# Patient Record
Sex: MALE | Race: WHITE | NOT HISPANIC OR LATINO | Employment: OTHER | URBAN - METROPOLITAN AREA
[De-identification: names, ages, dates, MRNs, and addresses within clinical notes are randomized per-mention and may not be internally consistent; named-entity substitution may affect disease eponyms.]

---

## 2017-11-16 PROBLEM — E11.9 TYPE 2 DIABETES MELLITUS WITHOUT COMPLICATION, WITHOUT LONG-TERM CURRENT USE OF INSULIN: Status: ACTIVE | Noted: 2017-11-16

## 2018-02-07 PROBLEM — M17.12 PRIMARY OSTEOARTHRITIS OF LEFT KNEE: Status: ACTIVE | Noted: 2018-02-07

## 2018-07-16 DIAGNOSIS — S32.009A LUMBAR VERTERBRAL FRACTURE, TRAUMATIC: ICD-10-CM

## 2018-07-24 PROBLEM — R07.9 CHEST PAIN WITH MODERATE RISK OF ACUTE CORONARY SYNDROME: Status: ACTIVE | Noted: 2018-07-24

## 2019-05-23 DIAGNOSIS — E11.9 TYPE 2 DIABETES MELLITUS WITHOUT COMPLICATION, WITHOUT LONG-TERM CURRENT USE OF INSULIN: Primary | ICD-10-CM

## 2019-05-23 RX ORDER — GLIPIZIDE 5 MG/1
5 TABLET ORAL DAILY
Qty: 90 TABLET | Refills: 3 | Status: SHIPPED | OUTPATIENT
Start: 2019-05-23 | End: 2020-02-10

## 2019-05-23 RX ORDER — GLIPIZIDE 5 MG/1
1 TABLET ORAL DAILY
COMMUNITY
Start: 2019-03-16 | End: 2019-05-23 | Stop reason: SDUPTHER

## 2019-05-23 NOTE — TELEPHONE ENCOUNTER
----- Message from Hao Rai sent at 5/23/2019 12:03 PM CDT -----  Contact: Chanda - Spouse  Type: Needs Medical Advice    Who Called:  Chanda  Pharmacy name and phone #:    [facility] - [place], [place] - [address]  [address] [address]  Phone: [phone] Fax: [phone]  Best Call Back Number: [phone]  Additional Information: Caller states that [facility] would like a new prescription of Glipizide (not listed) in order to refill the medication. Caller states that medication was prescribed before Dr. Parekh came to [facility]. Caller would also like to discuss lab orders. Please call to advise. Thanks!

## 2019-06-12 ENCOUNTER — TELEPHONE (OUTPATIENT)
Dept: FAMILY MEDICINE | Facility: CLINIC | Age: 74
End: 2019-06-12

## 2019-06-12 NOTE — TELEPHONE ENCOUNTER
----- Message from Nate Parekh MD sent at 6/12/2019 12:59 PM CDT -----  Very little protein in urine is very good.  Hepatitis-C is negative.  Diabetes is very well controlled.  Liver function and kidney function and electrolytes are all normal.  Bad cholesterol is just a few points to high.  Please continue the same medicines and improved diet.  White blood cell red blood cell platelets are all normal.

## 2019-07-19 ENCOUNTER — OFFICE VISIT (OUTPATIENT)
Dept: FAMILY MEDICINE | Facility: CLINIC | Age: 74
End: 2019-07-19
Payer: MEDICARE

## 2019-07-19 VITALS
HEART RATE: 79 BPM | WEIGHT: 306.44 LBS | HEIGHT: 74 IN | SYSTOLIC BLOOD PRESSURE: 118 MMHG | BODY MASS INDEX: 39.33 KG/M2 | OXYGEN SATURATION: 97 % | DIASTOLIC BLOOD PRESSURE: 70 MMHG

## 2019-07-19 DIAGNOSIS — I48.0 PAROXYSMAL ATRIAL FIBRILLATION: ICD-10-CM

## 2019-07-19 DIAGNOSIS — R60.0 LOCALIZED EDEMA: ICD-10-CM

## 2019-07-19 DIAGNOSIS — I10 ESSENTIAL HYPERTENSION: Primary | ICD-10-CM

## 2019-07-19 DIAGNOSIS — Z12.5 PROSTATE CANCER SCREENING: ICD-10-CM

## 2019-07-19 DIAGNOSIS — N40.0 BENIGN PROSTATIC HYPERPLASIA WITHOUT LOWER URINARY TRACT SYMPTOMS: ICD-10-CM

## 2019-07-19 DIAGNOSIS — K21.9 GERD WITHOUT ESOPHAGITIS: ICD-10-CM

## 2019-07-19 DIAGNOSIS — I25.10 CORONARY ARTERY DISEASE INVOLVING NATIVE CORONARY ARTERY OF NATIVE HEART WITHOUT ANGINA PECTORIS: ICD-10-CM

## 2019-07-19 DIAGNOSIS — E78.2 MIXED HYPERLIPIDEMIA: ICD-10-CM

## 2019-07-19 DIAGNOSIS — E66.01 CLASS 2 SEVERE OBESITY DUE TO EXCESS CALORIES WITH SERIOUS COMORBIDITY AND BODY MASS INDEX (BMI) OF 39.0 TO 39.9 IN ADULT: ICD-10-CM

## 2019-07-19 DIAGNOSIS — E11.9 CONTROLLED TYPE 2 DIABETES MELLITUS WITHOUT COMPLICATION, WITHOUT LONG-TERM CURRENT USE OF INSULIN: ICD-10-CM

## 2019-07-19 PROCEDURE — G0009 ADMIN PNEUMOCOCCAL VACCINE: HCPCS | Mod: HCNC,S$GLB,, | Performed by: INTERNAL MEDICINE

## 2019-07-19 PROCEDURE — 3078F PR MOST RECENT DIASTOLIC BLOOD PRESSURE < 80 MM HG: ICD-10-PCS | Mod: HCNC,CPTII,S$GLB, | Performed by: INTERNAL MEDICINE

## 2019-07-19 PROCEDURE — 99999 PR PBB SHADOW E&M-EST. PATIENT-LVL IV: ICD-10-PCS | Mod: PBBFAC,HCNC,, | Performed by: INTERNAL MEDICINE

## 2019-07-19 PROCEDURE — 99999 PR PBB SHADOW E&M-EST. PATIENT-LVL IV: CPT | Mod: PBBFAC,HCNC,, | Performed by: INTERNAL MEDICINE

## 2019-07-19 PROCEDURE — 1101F PR PT FALLS ASSESS DOC 0-1 FALLS W/OUT INJ PAST YR: ICD-10-PCS | Mod: HCNC,CPTII,S$GLB, | Performed by: INTERNAL MEDICINE

## 2019-07-19 PROCEDURE — 99214 PR OFFICE/OUTPT VISIT, EST, LEVL IV, 30-39 MIN: ICD-10-PCS | Mod: 25,HCNC,S$GLB, | Performed by: INTERNAL MEDICINE

## 2019-07-19 PROCEDURE — 1101F PT FALLS ASSESS-DOCD LE1/YR: CPT | Mod: HCNC,CPTII,S$GLB, | Performed by: INTERNAL MEDICINE

## 2019-07-19 PROCEDURE — 3078F DIAST BP <80 MM HG: CPT | Mod: HCNC,CPTII,S$GLB, | Performed by: INTERNAL MEDICINE

## 2019-07-19 PROCEDURE — 90670 PCV13 VACCINE IM: CPT | Mod: HCNC,S$GLB,, | Performed by: INTERNAL MEDICINE

## 2019-07-19 PROCEDURE — 99214 OFFICE O/P EST MOD 30 MIN: CPT | Mod: 25,HCNC,S$GLB, | Performed by: INTERNAL MEDICINE

## 2019-07-19 PROCEDURE — 3074F SYST BP LT 130 MM HG: CPT | Mod: HCNC,CPTII,S$GLB, | Performed by: INTERNAL MEDICINE

## 2019-07-19 PROCEDURE — 90670 PNEUMOCOCCAL CONJUGATE VACCINE 13-VALENT LESS THAN 5YO & GREATER THAN: ICD-10-PCS | Mod: HCNC,S$GLB,, | Performed by: INTERNAL MEDICINE

## 2019-07-19 PROCEDURE — 3074F PR MOST RECENT SYSTOLIC BLOOD PRESSURE < 130 MM HG: ICD-10-PCS | Mod: HCNC,CPTII,S$GLB, | Performed by: INTERNAL MEDICINE

## 2019-07-19 PROCEDURE — 3044F PR MOST RECENT HEMOGLOBIN A1C LEVEL <7.0%: ICD-10-PCS | Mod: HCNC,CPTII,S$GLB, | Performed by: INTERNAL MEDICINE

## 2019-07-19 PROCEDURE — 3044F HG A1C LEVEL LT 7.0%: CPT | Mod: HCNC,CPTII,S$GLB, | Performed by: INTERNAL MEDICINE

## 2019-07-19 PROCEDURE — G0009 PNEUMOCOCCAL CONJUGATE VACCINE 13-VALENT LESS THAN 5YO & GREATER THAN: ICD-10-PCS | Mod: HCNC,S$GLB,, | Performed by: INTERNAL MEDICINE

## 2019-07-19 NOTE — PROGRESS NOTES
Patient ID: Nate Bah     Chief Complaint:   Chief Complaint   Patient presents with    Establish Care/Previous Pt        HPI: New Patient to [facility] but known to me and Dr. Mckeon.   Doing well overall, but does have 2+ bilateral lower extremity edema and bilateral foot fungus- can't take Diflucan due to Tikosyn.  Labs reviewed: diabetes mellitus controlled, Urine protein low, LDL slightly high, other labs great.   Needs some health maintenance exams.     Review of Systems   Constitutional: Negative.  Negative for activity change and unexpected weight change.   HENT: Negative.  Negative for hearing loss, rhinorrhea and trouble swallowing.    Eyes: Negative.  Negative for discharge and visual disturbance.   Respiratory: Negative.  Negative for chest tightness and wheezing.    Cardiovascular: Negative.  Negative for chest pain and palpitations.   Gastrointestinal: Negative.  Negative for blood in stool, constipation, diarrhea and vomiting.   Endocrine: Negative.  Negative for polydipsia and polyuria.   Genitourinary: Negative.  Negative for difficulty urinating, hematuria and urgency.   Musculoskeletal: Negative.  Negative for arthralgias, joint swelling and neck pain.   Skin: Negative.    Allergic/Immunologic: Negative.    Neurological: Negative.  Negative for weakness and headaches.   Hematological: Negative.    Psychiatric/Behavioral: Negative.  Negative for confusion and dysphoric mood.   All other systems reviewed and are negative.         Objective:      Physical Exam   Physical Exam   Constitutional: He is oriented to person, place, and time. He appears well-developed and well-nourished.   HENT:   Head: Normocephalic and atraumatic.   Nose: Nose normal.   Mouth/Throat: Oropharynx is clear and moist.   Eyes: Pupils are equal, round, and reactive to light. Conjunctivae and EOM are normal.   Neck: Normal range of motion. Neck supple.   Cardiovascular: Normal rate, regular rhythm, normal heart sounds and  "intact distal pulses.   Pulmonary/Chest: Effort normal.   Decreased BS bilaterally    Abdominal: Soft. Bowel sounds are normal.   Musculoskeletal: Normal range of motion.   Neurological: He is alert and oriented to person, place, and time.   Skin: Skin is warm and dry. Capillary refill takes less than 2 seconds.   Psychiatric: He has a normal mood and affect. His behavior is normal. Judgment and thought content normal.   Nursing note and vitals reviewed.      Protective Sensation (w/ 10 gram monofilament):  Right: Intact  Left: Intact    Visual Inspection:  Callus -  Bilateral, Dry Skin -  Bilateral and Onychomycosis -  Bilateral    Pedal Pulses:   Right: Diminshed  Left: Diminshed    Posterior tibialis:   Right:Diminshed  Left: Diminshed      Vitals:   Vitals:    07/19/19 1353   BP: 118/70   BP Location: Right arm   Patient Position: Sitting   BP Method: Large (Manual)   Pulse: 79   SpO2: 97%   Weight: (!) 139 kg (306 lb 7 oz)   Height: 6' 2" (1.88 m)      Assessment:       Patient Active Problem List    Diagnosis Date Noted    Prostate cancer screening 07/19/2019    Obesity (BMI 30-39.9)     Mixed hyperlipidemia     GERD without esophagitis     Edema     Diabetes mellitus type 2, controlled, without complications     CAD (coronary artery disease)     BPH (benign prostatic hyperplasia)     A-fib     Primary osteoarthritis of left knee 02/07/2018    Type 2 diabetes mellitus without complication, without long-term current use of insulin 11/16/2017    Diffuse esophageal spasm 12/04/2016    Benign prostate hyperplasia 05/11/2016    Gout 09/29/2014    Paroxysmal atrial fibrillation 05/29/2014    Coronary artery disease + Ectasia, h/o CABG & stentsCABG x 1, LIMA to LAD 8/2003 D[place]orte, Stent RCA 2002, stent LCx 7/09, stent ostial LAD, LCx 11/11, 7/18 stent prox LAD 4.0 x 28 Synergy 12/20/2010    Essential hypertension 12/20/2010    Class 2 obesity with body mass index (BMI) of 39.0 to 39.9 in adult " 12/20/2010    Gastroesophageal reflux disease without esophagitis 08/20/2010          Plan:       Nate was seen today for establish care/previous pt.    Diagnoses and all orders for this visit:    Essential hypertension  -     Varicella zoster antibody, IgG; Future  -     US Abdominal Aorta; Future  Controlled     Prostate cancer screening  -     PSA, Screening; Future    Mixed hyperlipidemia  Controlled     GERD without esophagitis  Controlled     Localized edema  Uncontrolled  - cont Lasix and wear compression socks   Get Echo     Controlled type 2 diabetes mellitus without complication, without long-term current use of insulin  -     Diabetic Eye Screening Photo; Future    Coronary artery disease involving native coronary artery of native heart without angina pectoris  Stable     Benign prostatic hyperplasia without lower urinary tract symptoms  Monitor PSA     Paroxysmal atrial fibrillation  Controlled     Class 2 severe obesity due to excess calories with serious comorbidity and body mass index (BMI) of 39.0 to 39.9 in adult  Dieting and exercising to lose weight - has lost 18 lbs    Other orders  -     (In Office Administered) Pneumococcal Conjugate Vaccine (13 Valent) (IM)

## 2019-07-21 ENCOUNTER — PATIENT MESSAGE (OUTPATIENT)
Dept: FAMILY MEDICINE | Facility: CLINIC | Age: 74
End: 2019-07-21

## 2019-07-25 ENCOUNTER — PATIENT MESSAGE (OUTPATIENT)
Dept: FAMILY MEDICINE | Facility: CLINIC | Age: 74
End: 2019-07-25

## 2019-07-26 ENCOUNTER — HOSPITAL ENCOUNTER (OUTPATIENT)
Dept: RADIOLOGY | Facility: HOSPITAL | Age: 74
Discharge: HOME | End: 2019-07-26
Attending: INTERNAL MEDICINE
Payer: MEDICARE

## 2019-07-26 DIAGNOSIS — I10 ESSENTIAL HYPERTENSION: ICD-10-CM

## 2019-07-26 PROCEDURE — 76775 US ABDOMINAL AORTA: ICD-10-PCS | Mod: 26,HCNC,, | Performed by: RADIOLOGY

## 2019-07-26 PROCEDURE — 76775 US EXAM ABDO BACK WALL LIM: CPT | Mod: TC,HCNC,PO

## 2019-07-26 PROCEDURE — 76775 US EXAM ABDO BACK WALL LIM: CPT | Mod: 26,HCNC,, | Performed by: RADIOLOGY

## 2019-07-26 NOTE — TELEPHONE ENCOUNTER
My mistake that I didn't write it down. I think you're referring to an OTC cream for the fungus on his feet. He's can't take Diflucan due to his Tikosyn, so try OTC Terbinafine cream to bilateral feet twice daily x few weeks until it resolves.

## 2019-08-12 ENCOUNTER — OFFICE VISIT (OUTPATIENT)
Dept: PODIATRY | Facility: CLINIC | Age: 74
End: 2019-08-12
Payer: MEDICARE

## 2019-08-12 VITALS
HEART RATE: 86 BPM | HEIGHT: 74 IN | DIASTOLIC BLOOD PRESSURE: 80 MMHG | BODY MASS INDEX: 39.33 KG/M2 | WEIGHT: 306.44 LBS | SYSTOLIC BLOOD PRESSURE: 115 MMHG

## 2019-08-12 DIAGNOSIS — M20.42 HAMMER TOES OF BOTH FEET: ICD-10-CM

## 2019-08-12 DIAGNOSIS — B35.3 TINEA PEDIS OF RIGHT FOOT: ICD-10-CM

## 2019-08-12 DIAGNOSIS — B35.1 ONYCHOMYCOSIS DUE TO DERMATOPHYTE: ICD-10-CM

## 2019-08-12 DIAGNOSIS — L84 CORN OR CALLUS: ICD-10-CM

## 2019-08-12 DIAGNOSIS — M21.41 PES PLANUS OF BOTH FEET: ICD-10-CM

## 2019-08-12 DIAGNOSIS — M21.42 PES PLANUS OF BOTH FEET: ICD-10-CM

## 2019-08-12 DIAGNOSIS — M20.41 HAMMER TOES OF BOTH FEET: ICD-10-CM

## 2019-08-12 DIAGNOSIS — E66.01 SEVERE OBESITY (BMI 35.0-39.9) WITH COMORBIDITY: ICD-10-CM

## 2019-08-12 DIAGNOSIS — E11.42 DIABETIC POLYNEUROPATHY ASSOCIATED WITH TYPE 2 DIABETES MELLITUS: Primary | ICD-10-CM

## 2019-08-12 PROCEDURE — 3079F DIAST BP 80-89 MM HG: CPT | Mod: HCNC,CPTII,S$GLB, | Performed by: PODIATRIST

## 2019-08-12 PROCEDURE — 11056 PARNG/CUTG B9 HYPRKR LES 2-4: CPT | Mod: Q9,HCNC,S$GLB, | Performed by: PODIATRIST

## 2019-08-12 PROCEDURE — 99203 PR OFFICE/OUTPT VISIT, NEW, LEVL III, 30-44 MIN: ICD-10-PCS | Mod: 25,HCNC,S$GLB, | Performed by: PODIATRIST

## 2019-08-12 PROCEDURE — 3074F PR MOST RECENT SYSTOLIC BLOOD PRESSURE < 130 MM HG: ICD-10-PCS | Mod: HCNC,CPTII,S$GLB, | Performed by: PODIATRIST

## 2019-08-12 PROCEDURE — 3044F PR MOST RECENT HEMOGLOBIN A1C LEVEL <7.0%: ICD-10-PCS | Mod: HCNC,CPTII,S$GLB, | Performed by: PODIATRIST

## 2019-08-12 PROCEDURE — 3079F PR MOST RECENT DIASTOLIC BLOOD PRESSURE 80-89 MM HG: ICD-10-PCS | Mod: HCNC,CPTII,S$GLB, | Performed by: PODIATRIST

## 2019-08-12 PROCEDURE — 99999 PR PBB SHADOW E&M-EST. PATIENT-LVL III: ICD-10-PCS | Mod: PBBFAC,HCNC,, | Performed by: PODIATRIST

## 2019-08-12 PROCEDURE — 11721 PR DEBRIDEMENT OF NAILS, 6 OR MORE: ICD-10-PCS | Mod: 59,Q9,HCNC,S$GLB | Performed by: PODIATRIST

## 2019-08-12 PROCEDURE — 1101F PT FALLS ASSESS-DOCD LE1/YR: CPT | Mod: HCNC,CPTII,S$GLB, | Performed by: PODIATRIST

## 2019-08-12 PROCEDURE — 11056 PR TRIM BENIGN HYPERKERATOTIC SKIN LESION,2-4: ICD-10-PCS | Mod: Q9,HCNC,S$GLB, | Performed by: PODIATRIST

## 2019-08-12 PROCEDURE — 11721 DEBRIDE NAIL 6 OR MORE: CPT | Mod: 59,Q9,HCNC,S$GLB | Performed by: PODIATRIST

## 2019-08-12 PROCEDURE — 99203 OFFICE O/P NEW LOW 30 MIN: CPT | Mod: 25,HCNC,S$GLB, | Performed by: PODIATRIST

## 2019-08-12 PROCEDURE — 99999 PR PBB SHADOW E&M-EST. PATIENT-LVL III: CPT | Mod: PBBFAC,HCNC,, | Performed by: PODIATRIST

## 2019-08-12 PROCEDURE — 3044F HG A1C LEVEL LT 7.0%: CPT | Mod: HCNC,CPTII,S$GLB, | Performed by: PODIATRIST

## 2019-08-12 PROCEDURE — 3074F SYST BP LT 130 MM HG: CPT | Mod: HCNC,CPTII,S$GLB, | Performed by: PODIATRIST

## 2019-08-12 PROCEDURE — 1101F PR PT FALLS ASSESS DOC 0-1 FALLS W/OUT INJ PAST YR: ICD-10-PCS | Mod: HCNC,CPTII,S$GLB, | Performed by: PODIATRIST

## 2019-08-12 RX ORDER — ECONAZOLE NITRATE 10 MG/G
CREAM TOPICAL 2 TIMES DAILY
Qty: 30 G | Refills: 2 | Status: SHIPPED | OUTPATIENT
Start: 2019-08-12 | End: 2022-07-19

## 2019-08-12 NOTE — LETTER
August 12, 2019      Nate Parekh MD  [address] [facility] [address] [place] [address]           [facility]  [address] [facility] [address] [place] [address]  Phone: [phone]          Patient: Nate Bah   MR Number: [id number]   YOB: 1945   Date of Visit: 8/12/2019       Dear Dr. Nate Parekh:    Thank you for referring Nate Bah to me for evaluation. Attached you will find relevant portions of my assessment and plan of care.    If you have questions, please do not hesitate to call me. I look forward to following Nate Bah along with you.    Sincerely,    Nicholas Avila, RAMONE    Enclosure  CC:  No Recipients    If you would like to receive this communication electronically, please contact [email or web address] or [phone] to request more information on [employer] [facility] access.    For providers and/or their staff who would like to refer a patient to [facility], please contact us through our one-stop-shop provider referral line, [facility], at [phone].    If you feel you have received this communication in error or would no longer like to receive these types of communications, please e-mail [email or web address]

## 2019-08-13 NOTE — PROGRESS NOTES
Subjective:      Patient ID: Nate Bah is a 73 y.o. male.    Chief Complaint: Diabetes Mellitus ( 6.8 6/4/19 [place] 7/19/19) and Diabetic Foot Exam    Nate is a 73 y.o. male who presents to the clinic upon referral from Dr. Parekh  for evaluation and treatment of diabetic feet. Nate has a past medical history of A-fib, JACKSON (acute kidney injury) (12/4/2016), BPH (benign prostatic hyperplasia), CAD (coronary artery disease), Diabetes mellitus type 2, controlled, without complications, Edema, GERD without esophagitis, Gout, Mixed hyperlipidemia, and Obesity (BMI 30-39.9). Patient's chief complaint consists of thick toenails and calluses that are in need of trimming  Denies experiencing pain from these sites on exam.  Has not attempted to self treat.  Also, relates having a rash along the Rt. Medial arch with extension to the medial lower leg.  Relates itching from the site.  Has been previously prescribed triamcinolone cream, however, this has yet to improve his symptoms.  Inquires as to additional treatment options.   Notes better control over his blood glucose.  Denies any additional pedal complaints.    PCP: Nate Parekh MD    Date Last Seen by PCP: 7/19/19    Hemoglobin A1C   Date Value Ref Range Status   06/04/2019 6.8 (H) 0.0 - 5.6 % Final     Comment:     Reference Interval:  5.0 - 5.6 Normal   5.7 - 6.4 High Risk   > 6.5 Diabetic    Hgb A1c results are standardized based on the (NGSP) National   Glycohemoglobin Standardization Program.    Hemoglobin A1C levels are related to mean serum/plasma glucose   during the preceding 2-3 months.        03/07/2019 8.3 (H) 0.0 - 5.6 % Final     Comment:     Reference Interval:  5.0 - 5.6 Normal   5.7 - 6.4 High Risk   > 6.5 Diabetic    Hgb A1c results are standardized based on the (NGSP) National   Glycohemoglobin Standardization Program.    Hemoglobin A1C levels are related to mean serum/plasma glucose   during the preceding 2-3 months.        11/12/2018 6.6 (H) 0.0 -  5.6 % Final     Comment:     Reference Interval:  5.0 - 5.6 Normal   5.7 - 6.4 High Risk   > 6.5 Diabetic    Hgb A1c results are standardized based on the (NGSP) National   Glycohemoglobin Standardization Program.    Hemoglobin A1C levels are related to mean serum/plasma glucose   during the preceding 2-3 months.                Past Medical History:   Diagnosis Date    A-fib     JACKSON (acute kidney injury) 12/4/2016    BPH (benign prostatic hyperplasia)     CAD (coronary artery disease)     Diabetes mellitus type 2, controlled, without complications     Edema     GERD without esophagitis     Gout     Mixed hyperlipidemia     Obesity (BMI 30-39.9)        Past Surgical History:   Procedure Laterality Date    ARTHROPLASTY-KNEE Left 2/7/2018    Per[facility]et [facility] at [facility] OR    CARDIAC SURGERY  2003    CABG 1 vessel    CHOLECYSTECTOMY      COLON SURGERY  2007?    resection /perforated colon    Coronary angiography N/A 7/24/2018    [facility]    CORONARY ANGIOPLASTY WITH STENT PLACEMENT      5 stents, last one 2015    HEMORRHOID SURGERY      INSERTION, STENT, CORONARY ARTERY N/A 7/24/2018    Performed by [facility] Mckeon MD at [facility] CATH    KNEE ARTHROSCOPY W/ MENISCAL REPAIR Right     KYPHOSIS SURGERY      Left heart cath N/A 7/24/2018    Performed by [facility]keon MD at [facility] CATH       Family History   Problem Relation Age of Onset    Pancreatic cancer Mother     Heart disease Father     No Known Problems Brother        Social History     Socioeconomic History    Marital status:      Spouse name: Not on file    Number of children: Not on file    Years of education: Not on file    Highest education level: Not on file   Occupational History    Not on file   Social Needs    Financial resource strain: Not hard at all    Food insecurity:     Worry: Never true     Inability: Never true    Transportation needs:     Medical: No     Non-medical: No    Tobacco Use    Smoking status: Former Smoker     Packs/day: 1.00     Years: 25.00     Pack years: 25.00     Types: Cigarettes     Last attempt to quit: 2010     Years since quittin.6    Smokeless tobacco: Never Used   Substance and Sexual Activity    Alcohol use: No     Frequency: Never     Binge frequency: Never    Drug use: No    Sexual activity: Yes     Partners: Female   Lifestyle    Physical activity:     Days per week: 0 days     Minutes per session: Not on file    Stress: Not at all   Relationships    Social connections:     Talks on phone: Three times a week     Gets together: Once a week     Attends Jainism service: Not on file     Active member of club or organization: No     Attends meetings of clubs or organizations: Not on file     Relationship status:    Other Topics Concern    Not on file   Social History Narrative    Not on file       Current Outpatient Medications   Medication Sig Dispense Refill    acetaminophen (TYLENOL) 500 mg Cap Take 1,000 mg by mouth daily as needed.       allopurinol (ZYLOPRIM) 300 MG tablet TAKE 1 TABLET  BEFORE  BREAKFAST 90 tablet 3    atorvastatin (LIPITOR) 80 MG tablet TAKE 1 TABLET EVERY DAY 90 tablet 3    clopidogrel (PLAVIX) 75 mg tablet TAKE 1 TABLET EVERY DAY 90 tablet 3    coenzyme Q10 (CO Q-10) 10 mg capsule Take 10 mg by mouth once daily.      dicyclomine (BENTYL) 10 MG capsule Take 10 mg by mouth once daily.       docusate sodium (COLACE) 100 MG capsule Take 100 mg by mouth 2 (two) times daily.      dofetilide (TIKOSYN) 500 MCG Cap Take 1 capsule (500 mcg total) by mouth every 12 (twelve) hours. 60 capsule 6    econazole nitrate 1 % cream Apply topically 2 (two) times daily. 30 g 2    furosemide (LASIX) 40 MG tablet TAKE 1 TABLET EVERY DAY 90 tablet 3    glipiZIDE (GLUCOTROL) 5 MG tablet Take 1 tablet (5 mg total) by mouth once daily. 90 tablet 3    losartan (COZAAR) 100 MG tablet TAKE 1 TABLET EVERY DAY 90 tablet 3     magnesium 200 mg Tab Take 400 mg by mouth once daily.      pantoprazole (PROTONIX) 40 MG tablet TAKE 1 TABLET EVERY DAY 90 tablet 3     No current facility-administered medications for this visit.        Review of patient's allergies indicates:   Allergen Reactions    Ace inhibitors      cough         Review of Systems   Constitution: Negative for chills and fever.   Cardiovascular: Positive for leg swelling. Negative for claudication.   Skin: Positive for color change, dry skin and nail changes.   Musculoskeletal: Negative for muscle cramps, muscle weakness and myalgias.   Neurological: Positive for numbness. Negative for paresthesias.   Psychiatric/Behavioral: Negative for altered mental status.           Objective:      Physical Exam   Constitutional: He is oriented to person, place, and time. He appears well-developed and well-nourished. No distress.   Cardiovascular:   Pulses:       Dorsalis pedis pulses are 2+ on the right side, and 2+ on the left side.        Posterior tibial pulses are 1+ on the right side, and 1+ on the left side.   CFT is < 3 seconds bilateral.  Pedal hair growth is decreased bilateral.  Moderate nonpitting lower extremity edema noted bilateral.  Toes are cool to touch bilateral.     Musculoskeletal: He exhibits edema. He exhibits no tenderness.   Muscle strength 5/5 in all muscle groups bilateral.  No tenderness nor crepitation with ROM of foot/ankle joints bilateral.  No tenderness with palpation of bilateral foot and ankle.  Bilateral pes planus foot type.  Bilateral hallux abducto valgus.  Bilateral semi-reducible contracture of toes 2-5.     Neurological: He is alert and oriented to person, place, and time. He has normal strength. A sensory deficit is present.   Protective sensation per Nedrow-Everardo monofilament is decreased bilateral.  Vibratory sensation is absent on the Lt.  Light touch is intact bilateral.   Skin: Skin is warm, dry and intact. Capillary refill takes 2 to 3  seconds. Lesion and rash noted. No abrasion, no bruising, no burn, no ecchymosis, no laceration and no petechiae noted. Rash is papular. He is not diaphoretic. No erythema. No pallor.   Pedal skin appears edematous bilateral.  Toenails x 10 appear thickened by 2 mm's, elongated by 5 mm's, and discolored with subungual debris.  Focal hyperkeratoses noted to bilateral medial 1st ray.   Papular rash noted to the Rt. Lower medial leg that appears inflamed and dry, scaly.  No break noted in adjacent skin integrity.               Assessment:       Encounter Diagnoses   Name Primary?    Diabetic polyneuropathy associated with type 2 diabetes mellitus Yes    Corn or callus     Onychomycosis due to dermatophyte     Tinea pedis of right foot     Pes planus of both feet     Hammer toes of both feet     Severe obesity (BMI 35.0-39.9) with comorbidity          Plan:       Nate was seen today for diabetes mellitus and diabetic foot exam.    Diagnoses and all orders for this visit:    Diabetic polyneuropathy associated with type 2 diabetes mellitus  -     DIABETIC SHOES FOR HOME USE    Corn or callus  -     DIABETIC SHOES FOR HOME USE    Onychomycosis due to dermatophyte    Tinea pedis of right foot  -     econazole nitrate 1 % cream; Apply topically 2 (two) times daily.    Pes planus of both feet  -     DIABETIC SHOES FOR HOME USE    Hammer toes of both feet  -     DIABETIC SHOES FOR HOME USE    Severe obesity (BMI 35.0-39.9) with comorbidity      I counseled the patient on his conditions, their implications and medical management.    Discussed with patient a variety of treatment options to address onychomycosis including Venkat Vaporub, topical/oral antifungals, and laser therapy.    Advised to regularly clean shoe gear and shower surfaces to limit exposure.    Advised to be wary of walking barefoot on carpeted surfaces at gyms and hotel rooms.  Also, avoid barefoot walking at public showers and pool areas.    Rx written  for econazole nitrate to be applied BID x 2 weeks to the site of tinea pedis.    Discussed the importance of diet and exercise as they pertain to diabetes management and maintaining a healthy BMI.    Rx written for diabetic shoe gear and custom molded orthotics to offload digital deformities and to better support his pes planus.    Discussed the importance of diet and exercise as they pertain to diabetes management and maintaining a healthy BMI.  Shoe inspection. Diabetic Foot Education. Patient reminded of the importance of good nutrition and blood sugar control to help prevent podiatric complications of diabetes. Patient instructed on proper foot hygeine. We discussed wearing proper shoe gear, daily foot inspections, never walking without protective shoe gear, never putting sharp instruments to feet    With patient's permission, nails were aggressively reduced and debrided x 10 to their soft tissue attachment mechanically and with electric , removing all offending nail and debris.  Also, a sterile #15 scalpel was used to trim lesions x 2 down to smooth appearing skin without incident. Patient relates relief following the procedure. He will continue to monitor the areas daily, inspect his feet, wear protective shoe gear when ambulatory, moisturizer to maintain skin integrity and follow in this office in approximately 4 months, sooner p.r.n.    Follow up in about 4 months (around 12/12/2019).    Nicholas Avila DPM

## 2019-11-26 ENCOUNTER — PATIENT MESSAGE (OUTPATIENT)
Dept: ADMINISTRATIVE | Facility: HOSPITAL | Age: 74
End: 2019-11-26

## 2019-11-26 NOTE — LETTER
AUTHORIZATION FOR RELEASE OF   CONFIDENTIAL INFORMATION    Alexx Bynum MD    We are seeing Nate Bah, date of birth 1945, in the clinic at [facility]. Nate Parekh MD is the patient's PCP. Nate Bah has an outstanding lab/procedure at the time we reviewed his chart. In order to help keep his health information updated, he has authorized us to request the following medical record(s):        EYE EXAM                Please fax records to [facility], John C Oubre, MD,  [phone]     If you have any questions, please contact Arianne Kim, Care Coordinator   at [phone].            Patient Name: Nate Bah  : 1945  Patient Phone #: [phone]

## 2020-01-17 ENCOUNTER — LAB VISIT (OUTPATIENT)
Dept: LAB | Facility: HOSPITAL | Age: 75
End: 2020-01-17
Attending: INTERNAL MEDICINE
Payer: MEDICARE

## 2020-01-17 DIAGNOSIS — E78.2 MIXED HYPERLIPIDEMIA: ICD-10-CM

## 2020-01-17 DIAGNOSIS — E11.9 CONTROLLED TYPE 2 DIABETES MELLITUS WITHOUT COMPLICATION, WITHOUT LONG-TERM CURRENT USE OF INSULIN: ICD-10-CM

## 2020-01-17 DIAGNOSIS — R60.0 LOCALIZED EDEMA: ICD-10-CM

## 2020-01-17 LAB
ANION GAP SERPL CALC-SCNC: 9 MMOL/L (ref 8–16)
BUN SERPL-MCNC: 24 MG/DL (ref 8–23)
CALCIUM SERPL-MCNC: 10 MG/DL (ref 8.7–10.5)
CHLORIDE SERPL-SCNC: 99 MMOL/L (ref 95–110)
CHOLEST SERPL-MCNC: 145 MG/DL (ref 120–199)
CHOLEST/HDLC SERPL: 4.7 {RATIO} (ref 2–5)
CO2 SERPL-SCNC: 31 MMOL/L (ref 23–29)
CREAT SERPL-MCNC: 1.6 MG/DL (ref 0.5–1.4)
EST. GFR  (AFRICAN AMERICAN): 48.3 ML/MIN/1.73 M^2
EST. GFR  (NON AFRICAN AMERICAN): 41.8 ML/MIN/1.73 M^2
GLUCOSE SERPL-MCNC: 204 MG/DL (ref 70–110)
HDLC SERPL-MCNC: 31 MG/DL (ref 40–75)
HDLC SERPL: 21.4 % (ref 20–50)
LDLC SERPL CALC-MCNC: 70.4 MG/DL (ref 63–159)
NONHDLC SERPL-MCNC: 114 MG/DL
POTASSIUM SERPL-SCNC: 4.6 MMOL/L (ref 3.5–5.1)
SODIUM SERPL-SCNC: 139 MMOL/L (ref 136–145)
TRIGL SERPL-MCNC: 218 MG/DL (ref 30–150)

## 2020-01-17 PROCEDURE — 83036 HEMOGLOBIN GLYCOSYLATED A1C: CPT | Mod: HCNC

## 2020-01-17 PROCEDURE — 80061 LIPID PANEL: CPT | Mod: HCNC

## 2020-01-17 PROCEDURE — 80048 BASIC METABOLIC PNL TOTAL CA: CPT | Mod: HCNC

## 2020-01-17 PROCEDURE — 36415 COLL VENOUS BLD VENIPUNCTURE: CPT | Mod: HCNC,PO

## 2020-01-18 LAB
ESTIMATED AVG GLUCOSE: 200 MG/DL (ref 68–131)
HBA1C MFR BLD HPLC: 8.6 % (ref 4–5.6)

## 2020-01-22 ENCOUNTER — OFFICE VISIT (OUTPATIENT)
Dept: FAMILY MEDICINE | Facility: CLINIC | Age: 75
End: 2020-01-22
Payer: MEDICARE

## 2020-01-22 VITALS
HEIGHT: 74 IN | BODY MASS INDEX: 39.87 KG/M2 | WEIGHT: 310.63 LBS | SYSTOLIC BLOOD PRESSURE: 112 MMHG | HEART RATE: 95 BPM | DIASTOLIC BLOOD PRESSURE: 68 MMHG | OXYGEN SATURATION: 97 % | TEMPERATURE: 98 F

## 2020-01-22 DIAGNOSIS — I10 ESSENTIAL HYPERTENSION: ICD-10-CM

## 2020-01-22 DIAGNOSIS — Z12.5 PROSTATE CANCER SCREENING: ICD-10-CM

## 2020-01-22 DIAGNOSIS — E78.2 MIXED HYPERLIPIDEMIA: ICD-10-CM

## 2020-01-22 DIAGNOSIS — I48.0 PAROXYSMAL ATRIAL FIBRILLATION: ICD-10-CM

## 2020-01-22 DIAGNOSIS — R06.2 WHEEZING: ICD-10-CM

## 2020-01-22 DIAGNOSIS — R60.0 LOCALIZED EDEMA: ICD-10-CM

## 2020-01-22 DIAGNOSIS — E66.01 CLASS 2 SEVERE OBESITY DUE TO EXCESS CALORIES WITH SERIOUS COMORBIDITY AND BODY MASS INDEX (BMI) OF 39.0 TO 39.9 IN ADULT: ICD-10-CM

## 2020-01-22 PROBLEM — E66.812 CLASS 2 SEVERE OBESITY DUE TO EXCESS CALORIES WITH SERIOUS COMORBIDITY AND BODY MASS INDEX (BMI) OF 39.0 TO 39.9 IN ADULT: Status: ACTIVE | Noted: 2020-01-22

## 2020-01-22 PROCEDURE — 1159F MED LIST DOCD IN RCRD: CPT | Mod: HCNC,S$GLB,, | Performed by: INTERNAL MEDICINE

## 2020-01-22 PROCEDURE — 1159F PR MEDICATION LIST DOCUMENTED IN MEDICAL RECORD: ICD-10-PCS | Mod: HCNC,S$GLB,, | Performed by: INTERNAL MEDICINE

## 2020-01-22 PROCEDURE — 99214 PR OFFICE/OUTPT VISIT, EST, LEVL IV, 30-39 MIN: ICD-10-PCS | Mod: HCNC,S$GLB,, | Performed by: INTERNAL MEDICINE

## 2020-01-22 PROCEDURE — 1101F PT FALLS ASSESS-DOCD LE1/YR: CPT | Mod: HCNC,CPTII,S$GLB, | Performed by: INTERNAL MEDICINE

## 2020-01-22 PROCEDURE — 99499 RISK ADDL DX/OHS AUDIT: ICD-10-PCS | Mod: HCNC,S$GLB,, | Performed by: INTERNAL MEDICINE

## 2020-01-22 PROCEDURE — 3078F PR MOST RECENT DIASTOLIC BLOOD PRESSURE < 80 MM HG: ICD-10-PCS | Mod: HCNC,CPTII,S$GLB, | Performed by: INTERNAL MEDICINE

## 2020-01-22 PROCEDURE — 3078F DIAST BP <80 MM HG: CPT | Mod: HCNC,CPTII,S$GLB, | Performed by: INTERNAL MEDICINE

## 2020-01-22 PROCEDURE — 99214 OFFICE O/P EST MOD 30 MIN: CPT | Mod: HCNC,S$GLB,, | Performed by: INTERNAL MEDICINE

## 2020-01-22 PROCEDURE — 99999 PR PBB SHADOW E&M-EST. PATIENT-LVL III: ICD-10-PCS | Mod: PBBFAC,HCNC,, | Performed by: INTERNAL MEDICINE

## 2020-01-22 PROCEDURE — 3074F SYST BP LT 130 MM HG: CPT | Mod: HCNC,CPTII,S$GLB, | Performed by: INTERNAL MEDICINE

## 2020-01-22 PROCEDURE — 99999 PR PBB SHADOW E&M-EST. PATIENT-LVL III: CPT | Mod: PBBFAC,HCNC,, | Performed by: INTERNAL MEDICINE

## 2020-01-22 PROCEDURE — 3074F PR MOST RECENT SYSTOLIC BLOOD PRESSURE < 130 MM HG: ICD-10-PCS | Mod: HCNC,CPTII,S$GLB, | Performed by: INTERNAL MEDICINE

## 2020-01-22 PROCEDURE — 1101F PR PT FALLS ASSESS DOC 0-1 FALLS W/OUT INJ PAST YR: ICD-10-PCS | Mod: HCNC,CPTII,S$GLB, | Performed by: INTERNAL MEDICINE

## 2020-01-22 PROCEDURE — 99499 UNLISTED E&M SERVICE: CPT | Mod: HCNC,S$GLB,, | Performed by: INTERNAL MEDICINE

## 2020-01-22 RX ORDER — ALBUTEROL SULFATE 90 UG/1
2 AEROSOL, METERED RESPIRATORY (INHALATION) EVERY 6 HOURS PRN
Qty: 1 INHALER | Refills: 3 | Status: SHIPPED | OUTPATIENT
Start: 2020-01-22 | End: 2020-11-18

## 2020-01-22 RX ORDER — METFORMIN HYDROCHLORIDE 500 MG/1
500 TABLET ORAL 2 TIMES DAILY WITH MEALS
Qty: 180 TABLET | Refills: 3 | Status: SHIPPED | OUTPATIENT
Start: 2020-01-22 | End: 2020-03-31

## 2020-01-22 NOTE — PROGRESS NOTES
Patient ID: Nate Bah     Chief Complaint:   Chief Complaint   Patient presents with    6 month follow up        HPI: Follow up for diabetes mellitus that is now uncontrolled - likely due to diet. We discussed meds and will add Metformin twice daily to Glipizide. LDL controlled. Labs do look dehydrated. Last EF 55% in 2018. Bilateral lower extremity edema much improved today. NO diagnosis of COPD but he likely has the beginnings of it. Will give Rx for Albuterol inhaler for episodic shortness of breath / wheezing.     Review of Systems   Constitutional: Negative.  Negative for activity change and unexpected weight change.   HENT: Negative.  Negative for hearing loss, rhinorrhea and trouble swallowing.    Eyes: Negative.  Negative for discharge and visual disturbance.   Respiratory: Negative.  Negative for chest tightness and wheezing.    Cardiovascular: Negative.  Negative for chest pain and palpitations.   Gastrointestinal: Negative.  Negative for blood in stool, constipation, diarrhea and vomiting.   Endocrine: Negative.  Negative for polydipsia and polyuria.   Genitourinary: Negative.  Negative for difficulty urinating, hematuria and urgency.   Musculoskeletal: Negative.  Negative for arthralgias, joint swelling and neck pain.   Skin: Negative.    Allergic/Immunologic: Negative.    Neurological: Negative.  Negative for weakness and headaches.   Hematological: Negative.    Psychiatric/Behavioral: Negative.  Negative for confusion and dysphoric mood.          Objective:      Physical Exam   Physical Exam   Constitutional: He is oriented to person, place, and time. He appears well-developed and well-nourished.   HENT:   Head: Normocephalic and atraumatic.   Nose: Nose normal.   Mouth/Throat: Oropharynx is clear and moist.   Eyes: Pupils are equal, round, and reactive to light. Conjunctivae and EOM are normal.   Neck: Normal range of motion. Neck supple.   Cardiovascular: Normal rate, regular rhythm, normal  heart sounds and intact distal pulses.   Pulmonary/Chest: Effort normal.   Decreased Breath Sounds bilaterally   Abdominal: Soft. Bowel sounds are normal.   Musculoskeletal: Normal range of motion. He exhibits edema.   1+ bilateral lower extremity edema (improved)    Neurological: He is alert and oriented to person, place, and time.   Skin: Skin is warm and dry. Capillary refill takes less than 2 seconds.   Psychiatric: He has a normal mood and affect. His behavior is normal. Judgment and thought content normal.   Nursing note and vitals reviewed.      Current Outpatient Medications:     allopurinol (ZYLOPRIM) 300 MG tablet, TAKE 1 TABLET  BEFORE  BREAKFAST, Disp: 90 tablet, Rfl: 3    atorvastatin (LIPITOR) 80 MG tablet, TAKE 1 TABLET EVERY DAY, Disp: 90 tablet, Rfl: 3    clopidogrel (PLAVIX) 75 mg tablet, TAKE 1 TABLET EVERY DAY, Disp: 90 tablet, Rfl: 3    coenzyme Q10 (CO Q-10) 10 mg capsule, Take 10 mg by mouth once daily., Disp: , Rfl:     dicyclomine (BENTYL) 10 MG capsule, Take 10 mg by mouth daily as needed., Disp: , Rfl:     docusate sodium (COLACE) 100 MG capsule, Take 100 mg by mouth 2 (two) times daily., Disp: , Rfl:     dofetilide (TIKOSYN) 500 MCG Cap, Take 1 capsule (500 mcg total) by mouth every 12 (twelve) hours., Disp: 60 capsule, Rfl: 6    furosemide (LASIX) 40 MG tablet, TAKE 1 TABLET EVERY DAY, Disp: 90 tablet, Rfl: 3    glipiZIDE (GLUCOTROL) 5 MG tablet, Take 1 tablet (5 mg total) by mouth once daily., Disp: 90 tablet, Rfl: 3    losartan (COZAAR) 100 MG tablet, TAKE 1 TABLET EVERY DAY, Disp: 90 tablet, Rfl: 3    magnesium 200 mg Tab, Take 400 mg by mouth once daily., Disp: , Rfl:     pantoprazole (PROTONIX) 40 MG tablet, TAKE 1 TABLET EVERY DAY, Disp: 90 tablet, Rfl: 3    acetaminophen (TYLENOL) 500 mg Cap, Take 1,000 mg by mouth daily as needed. , Disp: , Rfl:     albuterol (PROVENTIL HFA) 90 mcg/actuation inhaler, Inhale 2 puffs into the lungs every 6 (six) hours as needed for  "Wheezing. Rescue, Disp: 1 Inhaler, Rfl: 3    econazole nitrate 1 % cream, Apply topically 2 (two) times daily., Disp: 30 g, Rfl: 2    metFORMIN (GLUCOPHAGE) 500 MG tablet, Take 1 tablet (500 mg total) by mouth 2 (two) times daily with meals., Disp: 180 tablet, Rfl: 3         Vitals:   Vitals:    01/22/20 1144   BP: 112/68   Pulse: 95   Temp: 98.2 °F (36.8 °C)   TempSrc: Temporal   SpO2: 97%   Weight: (!) 140.9 kg (310 lb 10.1 oz)   Height: 6' 2" (1.88 m)      Assessment:       Patient Active Problem List    Diagnosis Date Noted    Class 2 severe obesity due to excess calories with serious comorbidity and body mass index (BMI) of 39.0 to 39.9 in adult 01/22/2020    Wheezing 01/22/2020    Localized edema 01/22/2020    Hammer toes of both feet 08/12/2019    Diabetic polyneuropathy associated with type 2 diabetes mellitus 08/12/2019    Prostate cancer screening 07/19/2019    Obesity (BMI 30-39.9)     Mixed hyperlipidemia     Edema     BPH (benign prostatic hyperplasia)     Primary osteoarthritis of left knee 02/07/2018    Diabetes mellitus type 2, uncontrolled, without complications 11/16/2017    Diffuse esophageal spasm 12/04/2016    Benign prostate hyperplasia 05/11/2016    Gout 09/29/2014    Paroxysmal atrial fibrillation 05/29/2014    Coronary artery disease + Ectasia, h/o CABG & stentsCABG x 1, LIMA to LAD 8/2003 [place], Stent RCA 2002, stent LCx 7/09, stent ostial LAD, LCx 11/11, 7/18 stent prox LAD 4.0 x 28 Synergy 12/20/2010    Essential hypertension 12/20/2010    Class 2 obesity with body mass index (BMI) of 39.0 to 39.9 in adult 12/20/2010    Gastroesophageal reflux disease without esophagitis 08/20/2010          Plan:       Nate was seen today for 6 month follow up.    Diagnoses and all orders for this visit:    Diabetes mellitus type 2, uncontrolled, without complications  -     metFORMIN (GLUCOPHAGE) 500 MG tablet; Take 1 tablet (500 mg total) by mouth 2 (two) times daily with " meals.  Uncontrolled  - add metformin to glipizide and decrease cabs in diet     Class 2 severe obesity due to excess calories with serious comorbidity and body mass index (BMI) of 39.0 to 39.9 in adult  Will monitor as he diets for now    Paroxysmal atrial fibrillation  Controlled     Wheezing  -     albuterol (PROVENTIL HFA) 90 mcg/actuation inhaler; Inhale 2 puffs into the lungs every 6 (six) hours as needed for Wheezing. Rescue    Mixed hyperlipidemia  Controlled     Localized edema  Much improved     Essential hypertension  Controlled        There are no diagnoses linked to this encounter.

## 2020-01-27 ENCOUNTER — PATIENT MESSAGE (OUTPATIENT)
Dept: FAMILY MEDICINE | Facility: CLINIC | Age: 75
End: 2020-01-27

## 2020-01-31 ENCOUNTER — TELEPHONE (OUTPATIENT)
Dept: FAMILY MEDICINE | Facility: CLINIC | Age: 75
End: 2020-01-31

## 2020-01-31 DIAGNOSIS — I48.0 PAROXYSMAL ATRIAL FIBRILLATION: Primary | ICD-10-CM

## 2020-01-31 NOTE — TELEPHONE ENCOUNTER
I spoke to Dr. Mckeon re: Uncontrolled  hypertension since starting Metformin. Metformin has not been known to cause this particular reaction with Tykosin. It can, however, cause prolonged QT. So, has his Blood Pressure normalized off the Metformin? If it has, I'd like him to restart it on Sunday and come in for a Blood Pressure check and EKG on Monday.

## 2020-01-31 NOTE — TELEPHONE ENCOUNTER
Callback to patient--states since metformin per Dr Mckeon spoke with him, his blood pressure is fine, no chesty pain, ect.  States Dr Mckeon mentioned may have been related to bad indigestion/spasm is esophagus.  Patient states he is taking his BP regularly and it and his HR are back to normal and taking Metformin regularly

## 2020-02-10 RX ORDER — GLIPIZIDE 5 MG/1
TABLET ORAL
Qty: 90 TABLET | Refills: 3 | Status: SHIPPED | OUTPATIENT
Start: 2020-02-10 | End: 2021-01-13

## 2020-03-24 ENCOUNTER — PATIENT MESSAGE (OUTPATIENT)
Dept: FAMILY MEDICINE | Facility: CLINIC | Age: 75
End: 2020-03-24

## 2020-03-24 DIAGNOSIS — L02.31 GLUTEAL ABSCESS: Primary | ICD-10-CM

## 2020-03-24 RX ORDER — CLINDAMYCIN HYDROCHLORIDE 300 MG/1
600 CAPSULE ORAL 2 TIMES DAILY
Qty: 28 CAPSULE | Refills: 0 | Status: SHIPPED | OUTPATIENT
Start: 2020-03-24 | End: 2020-03-31

## 2020-03-24 NOTE — TELEPHONE ENCOUNTER
It sounds like he could have a gluteal abscess or a rectal abscess forming. I will send in Clindamycin antibiotics, but this will only cover a gluteal abscess. A rectral abscess requires different antibiotics. I really think he needs to go to the ER or see a surgeon some other way. Rx sent to [facility].

## 2020-03-25 PROBLEM — K61.1 PERIRECTAL ABSCESS: Status: ACTIVE | Noted: 2020-03-25

## 2020-03-28 ENCOUNTER — PATIENT MESSAGE (OUTPATIENT)
Dept: FAMILY MEDICINE | Facility: CLINIC | Age: 75
End: 2020-03-28

## 2020-04-13 ENCOUNTER — PATIENT MESSAGE (OUTPATIENT)
Dept: FAMILY MEDICINE | Facility: CLINIC | Age: 75
End: 2020-04-13

## 2020-05-05 ENCOUNTER — PATIENT MESSAGE (OUTPATIENT)
Dept: ADMINISTRATIVE | Facility: HOSPITAL | Age: 75
End: 2020-05-05

## 2020-06-22 ENCOUNTER — TELEPHONE (OUTPATIENT)
Dept: FAMILY MEDICINE | Facility: CLINIC | Age: 75
End: 2020-06-22

## 2020-06-22 NOTE — TELEPHONE ENCOUNTER
----- Message from Talya Lambert sent at 6/18/2020  3:16 PM CDT -----  Contact: self/[phone]  Pt called in [facility] to rescheduling his 3 month f/u sooner than sept. She would like a call back.     Please advise

## 2020-06-22 NOTE — TELEPHONE ENCOUNTER
I have no problem seeing him sooner than September. Can we schedule him in July or August? Does he need labs prior to the appointment ?

## 2020-06-22 NOTE — TELEPHONE ENCOUNTER
Callback to patient--appt was already scheduled last week    Message was sent directly to Dr Parekh

## 2020-07-13 ENCOUNTER — PATIENT OUTREACH (OUTPATIENT)
Dept: ADMINISTRATIVE | Facility: OTHER | Age: 75
End: 2020-07-13

## 2020-07-14 NOTE — PROGRESS NOTES
Requested updates within Care Everywhere.  Patient's chart was reviewed for overdue JUAN topics.  Immunizations reconciled.

## 2020-07-15 ENCOUNTER — OFFICE VISIT (OUTPATIENT)
Dept: PODIATRY | Facility: CLINIC | Age: 75
End: 2020-07-15
Payer: MEDICARE

## 2020-07-15 VITALS — BODY MASS INDEX: 38.11 KG/M2 | WEIGHT: 296.94 LBS | HEIGHT: 74 IN

## 2020-07-15 DIAGNOSIS — M20.42 HAMMER TOES OF BOTH FEET: ICD-10-CM

## 2020-07-15 DIAGNOSIS — B35.1 ONYCHOMYCOSIS DUE TO DERMATOPHYTE: ICD-10-CM

## 2020-07-15 DIAGNOSIS — E11.42 DIABETIC POLYNEUROPATHY ASSOCIATED WITH TYPE 2 DIABETES MELLITUS: Primary | ICD-10-CM

## 2020-07-15 DIAGNOSIS — E66.01 SEVERE OBESITY (BMI 35.0-39.9) WITH COMORBIDITY: ICD-10-CM

## 2020-07-15 DIAGNOSIS — M21.42 PES PLANUS OF BOTH FEET: ICD-10-CM

## 2020-07-15 DIAGNOSIS — M20.41 HAMMER TOES OF BOTH FEET: ICD-10-CM

## 2020-07-15 DIAGNOSIS — M21.41 PES PLANUS OF BOTH FEET: ICD-10-CM

## 2020-07-15 PROCEDURE — 3051F PR MOST RECENT HEMOGLOBIN A1C LEVEL 7.0 - < 8.0%: ICD-10-PCS | Mod: HCNC,CPTII,S$GLB, | Performed by: PODIATRIST

## 2020-07-15 PROCEDURE — 99999 PR PBB SHADOW E&M-EST. PATIENT-LVL II: CPT | Mod: PBBFAC,HCNC,, | Performed by: PODIATRIST

## 2020-07-15 PROCEDURE — 1126F AMNT PAIN NOTED NONE PRSNT: CPT | Mod: HCNC,S$GLB,, | Performed by: PODIATRIST

## 2020-07-15 PROCEDURE — 99999 PR PBB SHADOW E&M-EST. PATIENT-LVL II: ICD-10-PCS | Mod: PBBFAC,HCNC,, | Performed by: PODIATRIST

## 2020-07-15 PROCEDURE — 1159F PR MEDICATION LIST DOCUMENTED IN MEDICAL RECORD: ICD-10-PCS | Mod: HCNC,S$GLB,, | Performed by: PODIATRIST

## 2020-07-15 PROCEDURE — 3008F PR BODY MASS INDEX (BMI) DOCUMENTED: ICD-10-PCS | Mod: HCNC,CPTII,S$GLB, | Performed by: PODIATRIST

## 2020-07-15 PROCEDURE — 1101F PT FALLS ASSESS-DOCD LE1/YR: CPT | Mod: HCNC,CPTII,S$GLB, | Performed by: PODIATRIST

## 2020-07-15 PROCEDURE — 3008F BODY MASS INDEX DOCD: CPT | Mod: HCNC,CPTII,S$GLB, | Performed by: PODIATRIST

## 2020-07-15 PROCEDURE — 1101F PR PT FALLS ASSESS DOC 0-1 FALLS W/OUT INJ PAST YR: ICD-10-PCS | Mod: HCNC,CPTII,S$GLB, | Performed by: PODIATRIST

## 2020-07-15 PROCEDURE — 99213 PR OFFICE/OUTPT VISIT, EST, LEVL III, 20-29 MIN: ICD-10-PCS | Mod: HCNC,S$GLB,, | Performed by: PODIATRIST

## 2020-07-15 PROCEDURE — 99213 OFFICE O/P EST LOW 20 MIN: CPT | Mod: HCNC,S$GLB,, | Performed by: PODIATRIST

## 2020-07-15 PROCEDURE — 1159F MED LIST DOCD IN RCRD: CPT | Mod: HCNC,S$GLB,, | Performed by: PODIATRIST

## 2020-07-15 PROCEDURE — 3051F HG A1C>EQUAL 7.0%<8.0%: CPT | Mod: HCNC,CPTII,S$GLB, | Performed by: PODIATRIST

## 2020-07-15 PROCEDURE — 1126F PR PAIN SEVERITY QUANTIFIED, NO PAIN PRESENT: ICD-10-PCS | Mod: HCNC,S$GLB,, | Performed by: PODIATRIST

## 2020-07-16 NOTE — PROGRESS NOTES
Subjective:      Patient ID: Nate Bah is a 74 y.o. male.    Chief Complaint: Diabetes Mellitus ([place] 7.8 3/20), Diabetic Foot Exam, and Ankle Pain (rt)    Nate is a 74 y.o. male who presents to the clinic upon referral from Dr. Ary mark. provider found  for evaluation and treatment of diabetic feet. Nate has a past medical history of JACKSON (acute kidney injury) (12/4/2016), BPH (benign prostatic hyperplasia), Edema, GERD without esophagitis, Gout, Mixed hyperlipidemia, and Obesity (BMI 30-39.9). Patient's chief complaint consists of toenails that are in need of trimming  Denies experiencing pain from the nails with today's exam.  Has not attempted to self treat.  Also, relates having continued Rt. Ankle pain, however, is currently discussing a subtalar joint arthrodesis with a local orthopedist.   Notes better control over his blood glucose.  Denies any additional pedal complaints.    PCP: Nate Parekh MD    Date Last Seen by PCP: 3/20    Hemoglobin A1C   Date Value Ref Range Status   03/03/2020 7.8 (H) 0.0 - 5.6 % Final     Comment:     Reference Interval:  5.0 - 5.6 Normal   5.7 - 6.4 High Risk   > 6.5 Diabetic    Hgb A1c results are standardized based on the (NGSP) National   Glycohemoglobin Standardization Program.    Hemoglobin A1C levels are related to mean serum/plasma glucose   during the preceding 2-3 months.        01/17/2020 8.6 (H) 4.0 - 5.6 % Final     Comment:     ADA Screening Guidelines:  5.7-6.4%  Consistent with prediabetes  >or=6.5%  Consistent with diabetes  High levels of fetal hemoglobin interfere with the HbA1C  assay. Heterozygous hemoglobin variants (HbS, HgC, etc)do  not significantly interfere with this assay.   However, presence of multiple variants may affect accuracy.     06/04/2019 6.8 (H) 0.0 - 5.6 % Final     Comment:     Reference Interval:  5.0 - 5.6 Normal   5.7 - 6.4 High Risk   > 6.5 Diabetic    Hgb A1c results are standardized based on the (NGSP) National   Glycohemoglobin  Standardization Program.    Hemoglobin A1C levels are related to mean serum/plasma glucose   during the preceding 2-3 months.                Past Medical History:   Diagnosis Date    JACKSON (acute kidney injury) 12/4/2016    BPH (benign prostatic hyperplasia)     Edema     GERD without esophagitis     Gout     Mixed hyperlipidemia     Obesity (BMI 30-39.9)        Past Surgical History:   Procedure Laterality Date    CARDIAC SURGERY  2003    CABG 1 vessel    CHOLECYSTECTOMY      COLON SURGERY  2007?    resection /perforated colon    CORONARY ANGIOGRAPHY N/A 7/24/2018    Procedure: Coronary angiography;  Surgeon: Italo Mckeon MD;  Location: [facility] CATH;  Service: Cardiology;  Laterality: N/A;    CORONARY ANGIOPLASTY WITH STENT PLACEMENT      5 stents, last one 2015    CORONARY STENT PLACEMENT N/A 7/24/2018    Procedure: INSERTION, STENT, CORONARY ARTERY;  Surgeon: Italo Mckeon MD;  Location: [facility] CATH;  Service: Cardiology;  Laterality: N/A;    HEMORRHOID SURGERY      INCISION OF PERIRECTAL ABSCESS N/A 3/25/2020    Procedure: INCISION, ABSCESS, PERIRECTAL;  Surgeon: Nayan Mack MD;  Location: [facility] OR;  Service: General;  Laterality: N/A;    KNEE ARTHROSCOPY W/ MENISCAL REPAIR Right     KYPHOSIS SURGERY      LEFT HEART CATHETERIZATION N/A 7/24/2018    Procedure: Left heart cath;  Surgeon: Italo Mckeon MD;  Location: [facility] CATH;  Service: Cardiology;  Laterality: N/A;       Family History   Problem Relation Age of Onset    Pancreatic cancer Mother     Heart disease Father     No Known Problems Brother        Social History     Socioeconomic History    Marital status:      Spouse name: Not on file    Number of children: Not on file    Years of education: Not on file    Highest education level: Not on file   Occupational History    Not on file   Social Needs    Financial resource strain: Not hard at all    Food insecurity     Worry: Never true     Inability: Never true     Transportation needs     Medical: No     Non-medical: No   Tobacco Use    Smoking status: Former Smoker     Packs/day: 1.00     Years: 25.00     Pack years: 25.00     Types: Cigarettes     Quit date: 2010     Years since quitting: 10.5    Smokeless tobacco: Never Used   Substance and Sexual Activity    Alcohol use: No     Frequency: Never     Binge frequency: Never    Drug use: No    Sexual activity: Yes     Partners: Female   Lifestyle    Physical activity     Days per week: 0 days     Minutes per session: Not on file    Stress: Not at all   Relationships    Social connections     Talks on phone: Three times a week     Gets together: Once a week     Attends Mu-ism service: Not on file     Active member of club or organization: No     Attends meetings of clubs or organizations: Not on file     Relationship status:    Other Topics Concern    Not on file   Social History Narrative    Not on file       Current Outpatient Medications   Medication Sig Dispense Refill    albuterol (PROVENTIL HFA) 90 mcg/actuation inhaler Inhale 2 puffs into the lungs every 6 (six) hours as needed for Wheezing. Rescue 1 Inhaler 3    allopurinoL (ZYLOPRIM) 300 MG tablet TAKE 1 TABLET  BEFORE  BREAKFAST 90 tablet 3    atorvastatin (LIPITOR) 80 MG tablet Take 1 tablet (80 mg total) by mouth every other day.      clopidogreL (PLAVIX) 75 mg tablet TAKE 1 TABLET EVERY DAY 90 tablet 3    coenzyme Q10 (CO Q-10) 10 mg capsule Take 10 mg by mouth once daily.      dicyclomine (BENTYL) 10 MG capsule Take 10 mg by mouth daily as needed.      docusate sodium (COLACE) 100 MG capsule Take 100 mg by mouth 2 (two) times daily.      dofetilide (TIKOSYN) 500 MCG Cap Take 1 capsule (500 mcg total) by mouth every 12 (twelve) hours. (Patient taking differently: Take 500 mcg by mouth every 12 (twelve) hours. (Takes at 8AM & 8PM)) 60 capsule 6    econazole nitrate 1 % cream Apply topically 2 (two) times daily. 30 g 2    furosemide  (LASIX) 40 MG tablet TAKE 1 TABLET EVERY DAY 90 tablet 3    glipiZIDE (GLUCOTROL) 5 MG tablet TAKE 1 TABLET EVERY DAY 90 tablet 3    HYDROcodone-acetaminophen (NORCO)  mg per tablet Take 1 tablet by mouth every 6 (six) hours as needed (pain). 10 tablet 0    losartan (COZAAR) 100 MG tablet TAKE 1 TABLET EVERY DAY 90 tablet 3    magnesium 200 mg Tab Take 400 mg by mouth once daily.      metFORMIN (GLUCOPHAGE) 500 MG tablet Take 1 tablet (500 mg total) by mouth once daily. 90 tablet 3    pantoprazole (PROTONIX) 40 MG tablet TAKE 1 TABLET EVERY DAY 90 tablet 3     No current facility-administered medications for this visit.        Review of patient's allergies indicates:   Allergen Reactions    Ace inhibitors      cough         Review of Systems   Constitution: Negative for chills and fever.   Cardiovascular: Positive for leg swelling. Negative for claudication.   Skin: Positive for color change and nail changes. Negative for dry skin.   Musculoskeletal: Positive for joint pain. Negative for muscle cramps, muscle weakness and myalgias.   Neurological: Positive for numbness. Negative for paresthesias.   Psychiatric/Behavioral: Negative for altered mental status.           Objective:      Physical Exam  Constitutional:       General: He is not in acute distress.     Appearance: He is well-developed. He is not diaphoretic.   Cardiovascular:      Pulses:           Dorsalis pedis pulses are 2+ on the right side and 2+ on the left side.        Posterior tibial pulses are 1+ on the right side and 1+ on the left side.      Comments: CFT is < 3 seconds bilateral.  Pedal hair growth is decreased bilateral.  Moderate nonpitting lower extremity edema noted bilateral.  Toes are cool to touch bilateral.    Musculoskeletal:         General: No tenderness.      Comments: Muscle strength 5/5 in all muscle groups bilateral.  No tenderness nor crepitation with ROM of foot/ankle joints bilateral.  No tenderness with palpation  of bilateral foot and ankle.  Bilateral pes planus foot type.  Bilateral hallux abducto valgus.  Bilateral semi-reducible contracture of toes 2-5.     Skin:     General: Skin is warm and dry.      Capillary Refill: Capillary refill takes 2 to 3 seconds.      Coloration: Skin is not pale.      Findings: No abrasion, bruising, burn, ecchymosis, erythema, laceration, lesion, petechiae or rash.      Comments: Pedal skin appears edematous bilateral.  Toenails x 10 appear thickened by 2 mm's, elongated by 4 mm's, and discolored with subungual debris.    Neurological:      Mental Status: He is alert and oriented to person, place, and time.      Sensory: Sensory deficit present.      Comments: Protective sensation per [place] monofilament is decreased bilateral.  Vibratory sensation is absent on the Lt and intact on the Rt.  Light touch is intact bilateral.               Assessment:       Encounter Diagnoses   Name Primary?    Diabetic polyneuropathy associated with type 2 diabetes mellitus Yes    Pes planus of both feet     Hammer toes of both feet     Severe obesity (BMI 35.0-39.9) with comorbidity     Onychomycosis due to dermatophyte          Plan:       Nate was seen today for diabetes mellitus, diabetic foot exam and ankle pain.    Diagnoses and all orders for this visit:    Diabetic polyneuropathy associated with type 2 diabetes mellitus    Pes planus of both feet    Hammer toes of both feet    Severe obesity (BMI 35.0-39.9) with comorbidity    Onychomycosis due to dermatophyte      I counseled the patient on his conditions, their implications and medical management.    Discussed the importance of diet and exercise as they pertain to diabetes management and maintaining a healthy BMI.    Patient to continue wearing supportive shoe gear and insoles to support his pes planus foot type and to accommodate for digital deformities.    Discussed the importance of diet and exercise as they pertain to diabetes  management and maintaining a healthy BMI.  Shoe inspection. Diabetic Foot Education. Patient reminded of the importance of good nutrition and blood sugar control to help prevent podiatric complications of diabetes. Patient instructed on proper foot hygeine. We discussed wearing proper shoe gear, daily foot inspections, never walking without protective shoe gear, never putting sharp instruments to feet    With patient's permission, nails were aggressively reduced and debrided x 10 to their soft tissue attachment mechanically and with electric , removing all offending nail and debris. Patient relates relief following the procedure.  This was performed as a courtesy with today's exam.  He will continue to monitor the areas daily, inspect his feet, wear protective shoe gear when ambulatory, moisturizer to maintain skin integrity and follow in this office in approximately 12 months, sooner p.r.n.    Follow up in about 1 year (around 7/15/2021).    Nicholas Avila DPM

## 2020-07-21 ENCOUNTER — OFFICE VISIT (OUTPATIENT)
Dept: FAMILY MEDICINE | Facility: CLINIC | Age: 75
End: 2020-07-21
Payer: MEDICARE

## 2020-07-21 ENCOUNTER — IMMUNIZATION (OUTPATIENT)
Dept: PHARMACY | Facility: CLINIC | Age: 75
End: 2020-07-21
Payer: MEDICARE

## 2020-07-21 VITALS
BODY MASS INDEX: 39.13 KG/M2 | SYSTOLIC BLOOD PRESSURE: 122 MMHG | HEART RATE: 84 BPM | WEIGHT: 304.88 LBS | OXYGEN SATURATION: 96 % | DIASTOLIC BLOOD PRESSURE: 68 MMHG | HEIGHT: 74 IN | TEMPERATURE: 98 F

## 2020-07-21 DIAGNOSIS — E66.01 CLASS 2 SEVERE OBESITY DUE TO EXCESS CALORIES WITH SERIOUS COMORBIDITY AND BODY MASS INDEX (BMI) OF 39.0 TO 39.9 IN ADULT: ICD-10-CM

## 2020-07-21 DIAGNOSIS — I10 ESSENTIAL HYPERTENSION: ICD-10-CM

## 2020-07-21 DIAGNOSIS — E78.2 MIXED HYPERLIPIDEMIA: ICD-10-CM

## 2020-07-21 DIAGNOSIS — R04.0 EPISTAXIS: ICD-10-CM

## 2020-07-21 DIAGNOSIS — Z00.00 WELLNESS EXAMINATION: Primary | ICD-10-CM

## 2020-07-21 PROCEDURE — 3078F PR MOST RECENT DIASTOLIC BLOOD PRESSURE < 80 MM HG: ICD-10-PCS | Mod: HCNC,CPTII,S$GLB, | Performed by: INTERNAL MEDICINE

## 2020-07-21 PROCEDURE — 3074F PR MOST RECENT SYSTOLIC BLOOD PRESSURE < 130 MM HG: ICD-10-PCS | Mod: HCNC,CPTII,S$GLB, | Performed by: INTERNAL MEDICINE

## 2020-07-21 PROCEDURE — 99999 PR PBB SHADOW E&M-EST. PATIENT-LVL V: CPT | Mod: PBBFAC,HCNC,, | Performed by: INTERNAL MEDICINE

## 2020-07-21 PROCEDURE — G0009 PNEUMOCOCCAL POLYSACCHARIDE VACCINE 23-VALENT =>2YO SQ IM: ICD-10-PCS | Mod: HCNC,S$GLB,, | Performed by: INTERNAL MEDICINE

## 2020-07-21 PROCEDURE — 3074F SYST BP LT 130 MM HG: CPT | Mod: HCNC,CPTII,S$GLB, | Performed by: INTERNAL MEDICINE

## 2020-07-21 PROCEDURE — 3051F HG A1C>EQUAL 7.0%<8.0%: CPT | Mod: HCNC,CPTII,S$GLB, | Performed by: INTERNAL MEDICINE

## 2020-07-21 PROCEDURE — 3051F PR MOST RECENT HEMOGLOBIN A1C LEVEL 7.0 - < 8.0%: ICD-10-PCS | Mod: HCNC,CPTII,S$GLB, | Performed by: INTERNAL MEDICINE

## 2020-07-21 PROCEDURE — G0009 ADMIN PNEUMOCOCCAL VACCINE: HCPCS | Mod: HCNC,S$GLB,, | Performed by: INTERNAL MEDICINE

## 2020-07-21 PROCEDURE — 3078F DIAST BP <80 MM HG: CPT | Mod: HCNC,CPTII,S$GLB, | Performed by: INTERNAL MEDICINE

## 2020-07-21 PROCEDURE — 90732 PPSV23 VACC 2 YRS+ SUBQ/IM: CPT | Mod: HCNC,S$GLB,, | Performed by: INTERNAL MEDICINE

## 2020-07-21 PROCEDURE — 99397 PER PM REEVAL EST PAT 65+ YR: CPT | Mod: HCNC,S$GLB,25, | Performed by: INTERNAL MEDICINE

## 2020-07-21 PROCEDURE — 99499 RISK ADDL DX/OHS AUDIT: ICD-10-PCS | Mod: S$GLB,,, | Performed by: INTERNAL MEDICINE

## 2020-07-21 PROCEDURE — 90732 PNEUMOCOCCAL POLYSACCHARIDE VACCINE 23-VALENT =>2YO SQ IM: ICD-10-PCS | Mod: HCNC,S$GLB,, | Performed by: INTERNAL MEDICINE

## 2020-07-21 PROCEDURE — 99397 PR PREVENTIVE VISIT,EST,65 & OVER: ICD-10-PCS | Mod: HCNC,S$GLB,25, | Performed by: INTERNAL MEDICINE

## 2020-07-21 PROCEDURE — 99499 UNLISTED E&M SERVICE: CPT | Mod: S$GLB,,, | Performed by: INTERNAL MEDICINE

## 2020-07-21 PROCEDURE — 99999 PR PBB SHADOW E&M-EST. PATIENT-LVL V: ICD-10-PCS | Mod: PBBFAC,HCNC,, | Performed by: INTERNAL MEDICINE

## 2020-07-22 ENCOUNTER — LAB VISIT (OUTPATIENT)
Dept: LAB | Facility: HOSPITAL | Age: 75
End: 2020-07-22
Attending: INTERNAL MEDICINE
Payer: MEDICARE

## 2020-07-22 ENCOUNTER — OFFICE VISIT (OUTPATIENT)
Dept: OTOLARYNGOLOGY | Facility: CLINIC | Age: 75
End: 2020-07-22
Payer: MEDICARE

## 2020-07-22 VITALS
DIASTOLIC BLOOD PRESSURE: 85 MMHG | WEIGHT: 304 LBS | BODY MASS INDEX: 39.01 KG/M2 | HEIGHT: 74 IN | SYSTOLIC BLOOD PRESSURE: 144 MMHG

## 2020-07-22 DIAGNOSIS — Z79.01 ANTICOAGULANT LONG-TERM USE: ICD-10-CM

## 2020-07-22 DIAGNOSIS — R04.0 EPISTAXIS: ICD-10-CM

## 2020-07-22 DIAGNOSIS — R04.0 LEFT-SIDED EPISTAXIS: Primary | ICD-10-CM

## 2020-07-22 DIAGNOSIS — I10 HYPERTENSION, UNSPECIFIED TYPE: ICD-10-CM

## 2020-07-22 LAB
CHOLEST SERPL-MCNC: 142 MG/DL (ref 120–199)
CHOLEST/HDLC SERPL: 4.6 {RATIO} (ref 2–5)
HDLC SERPL-MCNC: 31 MG/DL (ref 40–75)
HDLC SERPL: 21.8 % (ref 20–50)
LDLC SERPL CALC-MCNC: 73.6 MG/DL (ref 63–159)
NONHDLC SERPL-MCNC: 111 MG/DL
TRIGL SERPL-MCNC: 187 MG/DL (ref 30–150)

## 2020-07-22 PROCEDURE — 3077F PR MOST RECENT SYSTOLIC BLOOD PRESSURE >= 140 MM HG: ICD-10-PCS | Mod: HCNC,CPTII,S$GLB, | Performed by: NURSE PRACTITIONER

## 2020-07-22 PROCEDURE — 1101F PT FALLS ASSESS-DOCD LE1/YR: CPT | Mod: HCNC,CPTII,S$GLB, | Performed by: NURSE PRACTITIONER

## 2020-07-22 PROCEDURE — 3079F DIAST BP 80-89 MM HG: CPT | Mod: HCNC,CPTII,S$GLB, | Performed by: NURSE PRACTITIONER

## 2020-07-22 PROCEDURE — 36415 COLL VENOUS BLD VENIPUNCTURE: CPT | Mod: HCNC,PO

## 2020-07-22 PROCEDURE — 83036 HEMOGLOBIN GLYCOSYLATED A1C: CPT | Mod: HCNC

## 2020-07-22 PROCEDURE — 30901 PR CTRL 2SEBLEED,ANTER,SIMPLE: ICD-10-PCS | Mod: HCNC,LT,S$GLB, | Performed by: NURSE PRACTITIONER

## 2020-07-22 PROCEDURE — 3008F PR BODY MASS INDEX (BMI) DOCUMENTED: ICD-10-PCS | Mod: HCNC,CPTII,S$GLB, | Performed by: NURSE PRACTITIONER

## 2020-07-22 PROCEDURE — 3079F PR MOST RECENT DIASTOLIC BLOOD PRESSURE 80-89 MM HG: ICD-10-PCS | Mod: HCNC,CPTII,S$GLB, | Performed by: NURSE PRACTITIONER

## 2020-07-22 PROCEDURE — 3008F BODY MASS INDEX DOCD: CPT | Mod: HCNC,CPTII,S$GLB, | Performed by: NURSE PRACTITIONER

## 2020-07-22 PROCEDURE — 1159F PR MEDICATION LIST DOCUMENTED IN MEDICAL RECORD: ICD-10-PCS | Mod: HCNC,S$GLB,, | Performed by: NURSE PRACTITIONER

## 2020-07-22 PROCEDURE — 3077F SYST BP >= 140 MM HG: CPT | Mod: HCNC,CPTII,S$GLB, | Performed by: NURSE PRACTITIONER

## 2020-07-22 PROCEDURE — 80061 LIPID PANEL: CPT | Mod: HCNC

## 2020-07-22 PROCEDURE — 99999 PR PBB SHADOW E&M-EST. PATIENT-LVL IV: CPT | Mod: PBBFAC,HCNC,, | Performed by: NURSE PRACTITIONER

## 2020-07-22 PROCEDURE — 30901 CONTROL OF NOSEBLEED: CPT | Mod: HCNC,LT,S$GLB, | Performed by: NURSE PRACTITIONER

## 2020-07-22 PROCEDURE — 1126F PR PAIN SEVERITY QUANTIFIED, NO PAIN PRESENT: ICD-10-PCS | Mod: HCNC,S$GLB,, | Performed by: NURSE PRACTITIONER

## 2020-07-22 PROCEDURE — 1126F AMNT PAIN NOTED NONE PRSNT: CPT | Mod: HCNC,S$GLB,, | Performed by: NURSE PRACTITIONER

## 2020-07-22 PROCEDURE — 1101F PR PT FALLS ASSESS DOC 0-1 FALLS W/OUT INJ PAST YR: ICD-10-PCS | Mod: HCNC,CPTII,S$GLB, | Performed by: NURSE PRACTITIONER

## 2020-07-22 PROCEDURE — 1159F MED LIST DOCD IN RCRD: CPT | Mod: HCNC,S$GLB,, | Performed by: NURSE PRACTITIONER

## 2020-07-22 PROCEDURE — 99203 OFFICE O/P NEW LOW 30 MIN: CPT | Mod: 25,HCNC,S$GLB, | Performed by: NURSE PRACTITIONER

## 2020-07-22 PROCEDURE — 99203 PR OFFICE/OUTPT VISIT, NEW, LEVL III, 30-44 MIN: ICD-10-PCS | Mod: 25,HCNC,S$GLB, | Performed by: NURSE PRACTITIONER

## 2020-07-22 PROCEDURE — 99999 PR PBB SHADOW E&M-EST. PATIENT-LVL IV: ICD-10-PCS | Mod: PBBFAC,HCNC,, | Performed by: NURSE PRACTITIONER

## 2020-07-22 RX ORDER — METFORMIN HYDROCHLORIDE 500 MG/5ML
SOLUTION ORAL
COMMUNITY
End: 2020-10-02

## 2020-07-22 NOTE — PATIENT INSTRUCTIONS
"Nose Bleed Instructions:  Ayr, Ocean, or other brand nasal saline gel into nose four times daily to keep moist.   Do not sleep or sit for long periods of time under a ceiling fan or other source of aggressive airflow.  Use a humidifier in bedroom or any room in your home you spend long periods of time.  Engage in only light activity. No strenuous activity. No heavy lifting or straining.   Use a stool softener to avoid straining.   No bending over at the hips. Keep nose above your heart at all times.  Sneeze with an open mouth to reduce pressure from the nose.   Avoid foods or drinks hot in temperature for at least 48 hours then progress slowly.  Avoid hot steamy showers or baths for one week.  After cauterization, it is common to experience facial or teeth pain for a week or so. Tylenol as needed for pain.     Try to control risk factors for nose bleeds:   (1) Keep an eye on your blood pressure; make sure it is not running high.   (2) Avoid using ANY type of nasal spray. If you must use them, insert them gently, not too far in, avoid irritating nasal membranes especially the septum.   (3) Keep the nose moist with Ponaris nasal emollient several times a day.  (4) Limit aspirin use or blood thinners as your doctor will allow. If nasal cauterization procedure is needed, ask your prescribing provider if you can hold off on all blood thinners for 4-5 days prior to procedure.   (5) Avoid inserting anything into the nose to clean it. The only "approved" way to clean your nose is to use liberal amounts of a fine saline mist followed by very gentle blowing, repeat until clear. Keep nails cut short (no white showing).     If nose was cauterized, avoid blowing through or sneezing through that side of your nose for the next 4-5 days. You may have a dissolvable dressing in your nose after the cauterization procedure. This dressing takes a few days to dissolve. After a week, you may begin saturating nose with a fine saline mist " followed by very gentle nasal blowing. No vigorous blowing. It is common for nosebleeds to return. This may require additional cauterization procedures. This is common.     As the numbing medication wears off, you may develop pain in your teeth, face, or headache. This is normal. We advise Tylenol for pain, which should resolve after a few days.

## 2020-07-22 NOTE — PROGRESS NOTES
Subjective:       Patient ID: Nate Bah is a 74 y.o. male.    Chief Complaint: No chief complaint on file.    HPI   Patient is new to ENT, referred by Dr. Parekh for consultation for epistaxis. Epistaxis is left. Episodes have been occurring intermittently for 2-3 years.  Frequency:  Up to 2-3 times per day  RISK FACTORS:    Blood pressure:  Patient does have a h/o HTN. Current BP is 144/85.   Blood thinners:  Plavix  Recent nasal spray use: No  Recent nasal trauma: No  Nasal dryness: uses saline gel  Nasal cleaning: no, happens in the middle of the night or no reason at all      Review of Systems   Constitutional: Negative.    HENT: Positive for nosebleeds.    Eyes: Negative.    Respiratory: Negative.    Cardiovascular: Negative.    Gastrointestinal: Negative.    Musculoskeletal: Negative.    Integumentary:  Negative.   Neurological: Negative.    Hematological: Negative.    Psychiatric/Behavioral: Negative.          Objective:      Physical Exam  Vitals signs and nursing note reviewed.   Constitutional:       General: He is not in acute distress.     Appearance: He is well-developed. He is not ill-appearing or diaphoretic.   HENT:      Head: Normocephalic and atraumatic.      Right Ear: Hearing, tympanic membrane, ear canal and external ear normal. No middle ear effusion. Tympanic membrane is not erythematous.      Left Ear: Hearing, tympanic membrane, ear canal and external ear normal.  No middle ear effusion. Tympanic membrane is not erythematous.      Nose: Nose normal.      Mouth/Throat:      Pharynx: Uvula midline.   Eyes:      General: Lids are normal. No scleral icterus.        Right eye: No discharge.         Left eye: No discharge.   Neck:      Musculoskeletal: Normal range of motion and neck supple.      Trachea: Trachea normal. No tracheal deviation.   Cardiovascular:      Rate and Rhythm: Normal rate.   Pulmonary:      Effort: Pulmonary effort is normal. No respiratory distress.      Breath sounds:  No stridor. No wheezing.   Musculoskeletal: Normal range of motion.   Skin:     General: Skin is warm and dry.      Coloration: Skin is not pale.   Neurological:      Mental Status: He is alert and oriented to person, place, and time.      Coordination: Coordination normal.      Gait: Gait normal.   Psychiatric:         Speech: Speech normal.         Behavior: Behavior normal. Behavior is cooperative.         Thought Content: Thought content normal.         Judgment: Judgment normal.         SEPARATE PROCEDURE NOTE:    After verbal consent obtained, phenylephrine and xylocaine-soaked cotton indwelled in nasal cavity X 10-15 minutes. After removal, area inspected; superficial engorged capillary noted on left anterior nasal septum. AgNO3 applied to site without event. Pt tolerated well. Surgicel Fibrillar applied to site.         Assessment:       1. Epistaxis, left       Plan:     Tylenol prn pain.   Avoid blowing through or sneezing through cauterized side of nose for 4-5 days.   Handouts given and discussed on epistaxis and nasal humidification protocols. Daily use of saline drops or gel to prevent mucosal desiccation. Discussed Ponaris nasal emollient.   Return to clinic as needed for further episodes or any other ENT concerns.

## 2020-07-23 LAB
ESTIMATED AVG GLUCOSE: 160 MG/DL (ref 68–131)
HBA1C MFR BLD HPLC: 7.2 % (ref 4–5.6)

## 2020-08-03 ENCOUNTER — PATIENT MESSAGE (OUTPATIENT)
Dept: OTOLARYNGOLOGY | Facility: CLINIC | Age: 75
End: 2020-08-03

## 2020-08-04 ENCOUNTER — PATIENT OUTREACH (OUTPATIENT)
Dept: ADMINISTRATIVE | Facility: OTHER | Age: 75
End: 2020-08-04

## 2020-08-06 ENCOUNTER — OFFICE VISIT (OUTPATIENT)
Dept: OTOLARYNGOLOGY | Facility: CLINIC | Age: 75
End: 2020-08-06
Payer: MEDICARE

## 2020-08-06 VITALS
DIASTOLIC BLOOD PRESSURE: 81 MMHG | HEIGHT: 74 IN | WEIGHT: 302.25 LBS | BODY MASS INDEX: 38.79 KG/M2 | SYSTOLIC BLOOD PRESSURE: 153 MMHG

## 2020-08-06 DIAGNOSIS — I10 HYPERTENSION, UNSPECIFIED TYPE: ICD-10-CM

## 2020-08-06 DIAGNOSIS — R04.0 RECURRENT EPISTAXIS: Primary | ICD-10-CM

## 2020-08-06 DIAGNOSIS — Z79.01 ANTICOAGULANT LONG-TERM USE: ICD-10-CM

## 2020-08-06 PROCEDURE — 1159F PR MEDICATION LIST DOCUMENTED IN MEDICAL RECORD: ICD-10-PCS | Mod: HCNC,S$GLB,, | Performed by: NURSE PRACTITIONER

## 2020-08-06 PROCEDURE — 1101F PR PT FALLS ASSESS DOC 0-1 FALLS W/OUT INJ PAST YR: ICD-10-PCS | Mod: HCNC,CPTII,S$GLB, | Performed by: NURSE PRACTITIONER

## 2020-08-06 PROCEDURE — 3008F PR BODY MASS INDEX (BMI) DOCUMENTED: ICD-10-PCS | Mod: HCNC,CPTII,S$GLB, | Performed by: NURSE PRACTITIONER

## 2020-08-06 PROCEDURE — 3077F PR MOST RECENT SYSTOLIC BLOOD PRESSURE >= 140 MM HG: ICD-10-PCS | Mod: HCNC,CPTII,S$GLB, | Performed by: NURSE PRACTITIONER

## 2020-08-06 PROCEDURE — 1126F PR PAIN SEVERITY QUANTIFIED, NO PAIN PRESENT: ICD-10-PCS | Mod: HCNC,S$GLB,, | Performed by: NURSE PRACTITIONER

## 2020-08-06 PROCEDURE — 99999 PR PBB SHADOW E&M-EST. PATIENT-LVL IV: CPT | Mod: PBBFAC,HCNC,, | Performed by: NURSE PRACTITIONER

## 2020-08-06 PROCEDURE — 1159F MED LIST DOCD IN RCRD: CPT | Mod: HCNC,S$GLB,, | Performed by: NURSE PRACTITIONER

## 2020-08-06 PROCEDURE — 99213 OFFICE O/P EST LOW 20 MIN: CPT | Mod: 25,HCNC,S$GLB, | Performed by: NURSE PRACTITIONER

## 2020-08-06 PROCEDURE — 3079F PR MOST RECENT DIASTOLIC BLOOD PRESSURE 80-89 MM HG: ICD-10-PCS | Mod: HCNC,CPTII,S$GLB, | Performed by: NURSE PRACTITIONER

## 2020-08-06 PROCEDURE — 99999 PR PBB SHADOW E&M-EST. PATIENT-LVL IV: ICD-10-PCS | Mod: PBBFAC,HCNC,, | Performed by: NURSE PRACTITIONER

## 2020-08-06 PROCEDURE — 1101F PT FALLS ASSESS-DOCD LE1/YR: CPT | Mod: HCNC,CPTII,S$GLB, | Performed by: NURSE PRACTITIONER

## 2020-08-06 PROCEDURE — 30906 PR REPEAT CONTROL OF 2SEBLEED: ICD-10-PCS | Mod: HCNC,S$GLB,, | Performed by: NURSE PRACTITIONER

## 2020-08-06 PROCEDURE — 30906 REPEAT CONTROL OF NOSEBLEED: CPT | Mod: HCNC,S$GLB,, | Performed by: NURSE PRACTITIONER

## 2020-08-06 PROCEDURE — 1126F AMNT PAIN NOTED NONE PRSNT: CPT | Mod: HCNC,S$GLB,, | Performed by: NURSE PRACTITIONER

## 2020-08-06 PROCEDURE — 99213 PR OFFICE/OUTPT VISIT, EST, LEVL III, 20-29 MIN: ICD-10-PCS | Mod: 25,HCNC,S$GLB, | Performed by: NURSE PRACTITIONER

## 2020-08-06 PROCEDURE — 3077F SYST BP >= 140 MM HG: CPT | Mod: HCNC,CPTII,S$GLB, | Performed by: NURSE PRACTITIONER

## 2020-08-06 PROCEDURE — 3008F BODY MASS INDEX DOCD: CPT | Mod: HCNC,CPTII,S$GLB, | Performed by: NURSE PRACTITIONER

## 2020-08-06 PROCEDURE — 3079F DIAST BP 80-89 MM HG: CPT | Mod: HCNC,CPTII,S$GLB, | Performed by: NURSE PRACTITIONER

## 2020-08-06 NOTE — PROGRESS NOTES
Subjective:       Patient ID: Nate Bah is a 74 y.o. male.    Chief Complaint: Epistaxis    HPI   Patient had left-sided nasal septal cauterization to control recurrent epistaxis done by me on 7/22/20.  Patient states epistaxis is 90% improved. Still slight bleeding at times, if he is working outdoors in the heat of the day.   RISK FACTORS:    Blood pressure:  Patient does have a h/o HTN. Current BP is 153/81.   Blood thinners:  Plavix  Recent nasal spray use: No  Recent nasal trauma: No  Nasal dryness: uses saline gel  Nasal cleaning: no, happens in the middle of the night or no reason at all      Review of Systems   Constitutional: Negative.    HENT: Positive for nosebleeds.    Eyes: Negative.    Respiratory: Negative.    Cardiovascular: Negative.    Gastrointestinal: Negative.    Musculoskeletal: Negative.    Integumentary:  Negative.   Neurological: Negative.    Hematological: Negative.    Psychiatric/Behavioral: Negative.          Objective:      Physical Exam  Vitals signs and nursing note reviewed.   Constitutional:       General: He is not in acute distress.     Appearance: He is well-developed. He is not ill-appearing or diaphoretic.   HENT:      Head: Normocephalic and atraumatic.      Right Ear: Hearing, tympanic membrane, ear canal and external ear normal. No middle ear effusion. Tympanic membrane is not erythematous.      Left Ear: Hearing, tympanic membrane, ear canal and external ear normal.  No middle ear effusion. Tympanic membrane is not erythematous.      Nose: Nose normal.      Mouth/Throat:      Pharynx: Uvula midline.   Eyes:      General: Lids are normal. No scleral icterus.        Right eye: No discharge.         Left eye: No discharge.   Neck:      Musculoskeletal: Normal range of motion and neck supple.      Trachea: Trachea normal. No tracheal deviation.   Cardiovascular:      Rate and Rhythm: Normal rate.   Pulmonary:      Effort: Pulmonary effort is normal. No respiratory distress.       Breath sounds: No stridor. No wheezing.   Musculoskeletal: Normal range of motion.   Skin:     General: Skin is warm and dry.      Coloration: Skin is not pale.   Neurological:      Mental Status: He is alert and oriented to person, place, and time.      Coordination: Coordination normal.      Gait: Gait normal.   Psychiatric:         Speech: Speech normal.         Behavior: Behavior normal. Behavior is cooperative.         Thought Content: Thought content normal.         Judgment: Judgment normal.         SEPARATE PROCEDURE NOTE:    After verbal consent obtained, phenylephrine and xylocaine-soaked cotton indwelled in nasal cavity X 10-15 minutes. After removal, area inspected; superficial engorged capillary noted on left anterior nasal septum. AgNO3 applied to site without event. Pt tolerated well. Surgicel Fibrillar applied to site.         Assessment:       1. Epistaxis, left       Plan:     Tylenol prn pain.   Avoid blowing through or sneezing through cauterized side of nose for 4-5 days.   Handouts given and discussed on epistaxis and nasal humidification protocols. Daily use of saline drops or gel to prevent mucosal desiccation. Discussed Ponaris nasal emollient.   Return to clinic as needed for further episodes or any other ENT concerns.

## 2020-09-22 ENCOUNTER — IMMUNIZATION (OUTPATIENT)
Dept: PHARMACY | Facility: CLINIC | Age: 75
End: 2020-09-22
Payer: MEDICARE

## 2020-11-13 ENCOUNTER — PATIENT MESSAGE (OUTPATIENT)
Dept: ADMINISTRATIVE | Facility: HOSPITAL | Age: 75
End: 2020-11-13

## 2020-11-16 ENCOUNTER — TELEPHONE (OUTPATIENT)
Dept: FAMILY MEDICINE | Facility: CLINIC | Age: 75
End: 2020-11-16

## 2020-11-16 NOTE — TELEPHONE ENCOUNTER
Outreach to patient for hypertension management.     RE: Need to find out if patient is monitoring blood pressure at home.  If so, the most recent blood pressure is needed to update the chart.    Spoke with patient and he stated that he does not check his blood pressure at home.  Declined nurse visit or sooner appointment that January 2021.

## 2020-12-22 ENCOUNTER — PATIENT MESSAGE (OUTPATIENT)
Dept: FAMILY MEDICINE | Facility: CLINIC | Age: 75
End: 2020-12-22

## 2021-01-04 ENCOUNTER — PATIENT MESSAGE (OUTPATIENT)
Dept: ADMINISTRATIVE | Facility: HOSPITAL | Age: 76
End: 2021-01-04

## 2021-01-06 DIAGNOSIS — E11.9 TYPE 2 DIABETES MELLITUS WITHOUT COMPLICATION, UNSPECIFIED WHETHER LONG TERM INSULIN USE: ICD-10-CM

## 2021-01-28 ENCOUNTER — OFFICE VISIT (OUTPATIENT)
Dept: FAMILY MEDICINE | Facility: CLINIC | Age: 76
End: 2021-01-28
Payer: MEDICARE

## 2021-01-28 ENCOUNTER — CLINICAL SUPPORT (OUTPATIENT)
Dept: FAMILY MEDICINE | Facility: CLINIC | Age: 76
End: 2021-01-28
Attending: INTERNAL MEDICINE
Payer: MEDICARE

## 2021-01-28 VITALS
OXYGEN SATURATION: 98 % | DIASTOLIC BLOOD PRESSURE: 74 MMHG | HEIGHT: 74 IN | HEART RATE: 56 BPM | BODY MASS INDEX: 40.43 KG/M2 | WEIGHT: 315 LBS | SYSTOLIC BLOOD PRESSURE: 142 MMHG

## 2021-01-28 DIAGNOSIS — I10 ESSENTIAL HYPERTENSION: Primary | ICD-10-CM

## 2021-01-28 DIAGNOSIS — R26.2 DIFFICULTY WALKING: ICD-10-CM

## 2021-01-28 DIAGNOSIS — E66.01 CLASS 3 SEVERE OBESITY DUE TO EXCESS CALORIES WITH SERIOUS COMORBIDITY AND BODY MASS INDEX (BMI) OF 40.0 TO 44.9 IN ADULT: ICD-10-CM

## 2021-01-28 DIAGNOSIS — M10.49 OTHER SECONDARY ACUTE GOUT OF MULTIPLE SITES: ICD-10-CM

## 2021-01-28 DIAGNOSIS — I48.0 PAROXYSMAL ATRIAL FIBRILLATION: ICD-10-CM

## 2021-01-28 DIAGNOSIS — I73.9 PERIPHERAL VASCULAR DISEASE, UNSPECIFIED: ICD-10-CM

## 2021-01-28 DIAGNOSIS — E78.2 MIXED HYPERLIPIDEMIA: ICD-10-CM

## 2021-01-28 DIAGNOSIS — E11.9 CONTROLLED TYPE 2 DIABETES MELLITUS WITHOUT COMPLICATION, WITHOUT LONG-TERM CURRENT USE OF INSULIN: ICD-10-CM

## 2021-01-28 DIAGNOSIS — E11.9 TYPE 2 DIABETES MELLITUS WITHOUT COMPLICATION, UNSPECIFIED WHETHER LONG TERM INSULIN USE: ICD-10-CM

## 2021-01-28 PROBLEM — E66.813 CLASS 3 SEVERE OBESITY DUE TO EXCESS CALORIES WITH SERIOUS COMORBIDITY AND BODY MASS INDEX (BMI) OF 40.0 TO 44.9 IN ADULT: Status: ACTIVE | Noted: 2020-01-22

## 2021-01-28 PROCEDURE — 3051F HG A1C>EQUAL 7.0%<8.0%: CPT | Mod: CPTII,S$GLB,, | Performed by: INTERNAL MEDICINE

## 2021-01-28 PROCEDURE — 99214 OFFICE O/P EST MOD 30 MIN: CPT | Mod: S$GLB,,, | Performed by: INTERNAL MEDICINE

## 2021-01-28 PROCEDURE — 92228 IMG RTA DETC/MNTR DS PHY/QHP: CPT | Mod: 26,S$GLB,, | Performed by: OPTOMETRIST

## 2021-01-28 PROCEDURE — 3288F PR FALLS RISK ASSESSMENT DOCUMENTED: ICD-10-PCS | Mod: CPTII,S$GLB,, | Performed by: INTERNAL MEDICINE

## 2021-01-28 PROCEDURE — 99999 PR PBB SHADOW E&M-EST. PATIENT-LVL IV: CPT | Mod: PBBFAC,,, | Performed by: INTERNAL MEDICINE

## 2021-01-28 PROCEDURE — 3077F PR MOST RECENT SYSTOLIC BLOOD PRESSURE >= 140 MM HG: ICD-10-PCS | Mod: CPTII,S$GLB,, | Performed by: INTERNAL MEDICINE

## 2021-01-28 PROCEDURE — 99499 UNLISTED E&M SERVICE: CPT | Mod: HCNC,S$GLB,, | Performed by: INTERNAL MEDICINE

## 2021-01-28 PROCEDURE — 1159F PR MEDICATION LIST DOCUMENTED IN MEDICAL RECORD: ICD-10-PCS | Mod: S$GLB,,, | Performed by: INTERNAL MEDICINE

## 2021-01-28 PROCEDURE — 1101F PT FALLS ASSESS-DOCD LE1/YR: CPT | Mod: CPTII,S$GLB,, | Performed by: INTERNAL MEDICINE

## 2021-01-28 PROCEDURE — 99499 RISK ADDL DX/OHS AUDIT: ICD-10-PCS | Mod: HCNC,S$GLB,, | Performed by: INTERNAL MEDICINE

## 2021-01-28 PROCEDURE — 3078F DIAST BP <80 MM HG: CPT | Mod: CPTII,S$GLB,, | Performed by: INTERNAL MEDICINE

## 2021-01-28 PROCEDURE — 3051F PR MOST RECENT HEMOGLOBIN A1C LEVEL 7.0 - < 8.0%: ICD-10-PCS | Mod: CPTII,S$GLB,, | Performed by: INTERNAL MEDICINE

## 2021-01-28 PROCEDURE — 92228 DIABETIC EYE SCREENING PHOTO: ICD-10-PCS | Mod: TC,S$GLB,, | Performed by: INTERNAL MEDICINE

## 2021-01-28 PROCEDURE — 3078F PR MOST RECENT DIASTOLIC BLOOD PRESSURE < 80 MM HG: ICD-10-PCS | Mod: CPTII,S$GLB,, | Performed by: INTERNAL MEDICINE

## 2021-01-28 PROCEDURE — 3077F SYST BP >= 140 MM HG: CPT | Mod: CPTII,S$GLB,, | Performed by: INTERNAL MEDICINE

## 2021-01-28 PROCEDURE — 99999 PR PBB SHADOW E&M-EST. PATIENT-LVL IV: ICD-10-PCS | Mod: PBBFAC,,, | Performed by: INTERNAL MEDICINE

## 2021-01-28 PROCEDURE — 92228 IMG RTA DETC/MNTR DS PHY/QHP: CPT | Mod: TC,S$GLB,, | Performed by: INTERNAL MEDICINE

## 2021-01-28 PROCEDURE — 92228 DIABETIC EYE SCREENING PHOTO: ICD-10-PCS | Mod: 26,S$GLB,, | Performed by: OPTOMETRIST

## 2021-01-28 PROCEDURE — 1159F MED LIST DOCD IN RCRD: CPT | Mod: S$GLB,,, | Performed by: INTERNAL MEDICINE

## 2021-01-28 PROCEDURE — 3288F FALL RISK ASSESSMENT DOCD: CPT | Mod: CPTII,S$GLB,, | Performed by: INTERNAL MEDICINE

## 2021-01-28 PROCEDURE — 99214 PR OFFICE/OUTPT VISIT, EST, LEVL IV, 30-39 MIN: ICD-10-PCS | Mod: S$GLB,,, | Performed by: INTERNAL MEDICINE

## 2021-01-28 PROCEDURE — 1101F PR PT FALLS ASSESS DOC 0-1 FALLS W/OUT INJ PAST YR: ICD-10-PCS | Mod: CPTII,S$GLB,, | Performed by: INTERNAL MEDICINE

## 2021-01-28 RX ORDER — INDOMETHACIN 50 MG/1
50 CAPSULE ORAL 2 TIMES DAILY WITH MEALS
Qty: 30 CAPSULE | Refills: 0 | Status: SHIPPED | OUTPATIENT
Start: 2021-01-28 | End: 2022-03-22

## 2021-04-06 ENCOUNTER — PATIENT MESSAGE (OUTPATIENT)
Dept: ADMINISTRATIVE | Facility: HOSPITAL | Age: 76
End: 2021-04-06

## 2021-05-02 PROBLEM — R07.9 CHEST PAIN: Status: ACTIVE | Noted: 2021-05-02

## 2021-05-02 PROBLEM — I44.1 HEART BLOCK AV SECOND DEGREE: Status: ACTIVE | Noted: 2021-05-02

## 2021-05-02 PROBLEM — Z71.89 ACP (ADVANCE CARE PLANNING): Status: ACTIVE | Noted: 2021-05-02

## 2021-05-04 PROBLEM — R07.9 CHEST PAIN: Status: RESOLVED | Noted: 2021-05-02 | Resolved: 2021-05-04

## 2021-05-04 PROBLEM — R07.9 CHEST PAIN: Status: ACTIVE | Noted: 2021-05-04

## 2021-05-11 ENCOUNTER — PATIENT MESSAGE (OUTPATIENT)
Dept: FAMILY MEDICINE | Facility: CLINIC | Age: 76
End: 2021-05-11

## 2021-05-11 ENCOUNTER — TELEPHONE (OUTPATIENT)
Dept: FAMILY MEDICINE | Facility: CLINIC | Age: 76
End: 2021-05-11

## 2021-05-17 ENCOUNTER — PATIENT MESSAGE (OUTPATIENT)
Dept: FAMILY MEDICINE | Facility: CLINIC | Age: 76
End: 2021-05-17

## 2021-07-07 ENCOUNTER — PATIENT MESSAGE (OUTPATIENT)
Dept: ADMINISTRATIVE | Facility: HOSPITAL | Age: 76
End: 2021-07-07

## 2021-07-21 ENCOUNTER — LAB VISIT (OUTPATIENT)
Dept: LAB | Facility: HOSPITAL | Age: 76
End: 2021-07-21
Attending: INTERNAL MEDICINE
Payer: MEDICARE

## 2021-07-21 DIAGNOSIS — I10 ESSENTIAL HYPERTENSION: ICD-10-CM

## 2021-07-21 DIAGNOSIS — E11.9 CONTROLLED TYPE 2 DIABETES MELLITUS WITHOUT COMPLICATION, WITHOUT LONG-TERM CURRENT USE OF INSULIN: ICD-10-CM

## 2021-07-21 DIAGNOSIS — M10.49 OTHER SECONDARY ACUTE GOUT OF MULTIPLE SITES: ICD-10-CM

## 2021-07-21 LAB
ALBUMIN SERPL BCP-MCNC: 3.7 G/DL (ref 3.5–5.2)
ALP SERPL-CCNC: 91 U/L (ref 55–135)
ALT SERPL W/O P-5'-P-CCNC: 16 U/L (ref 10–44)
ANION GAP SERPL CALC-SCNC: 10 MMOL/L (ref 8–16)
AST SERPL-CCNC: 18 U/L (ref 10–40)
BASOPHILS # BLD AUTO: 0.04 K/UL (ref 0–0.2)
BASOPHILS NFR BLD: 0.6 % (ref 0–1.9)
BILIRUB SERPL-MCNC: 1.2 MG/DL (ref 0.1–1)
BUN SERPL-MCNC: 17 MG/DL (ref 8–23)
CALCIUM SERPL-MCNC: 9.7 MG/DL (ref 8.7–10.5)
CHLORIDE SERPL-SCNC: 103 MMOL/L (ref 95–110)
CHOLEST SERPL-MCNC: 135 MG/DL (ref 120–199)
CHOLEST/HDLC SERPL: 4 {RATIO} (ref 2–5)
CO2 SERPL-SCNC: 28 MMOL/L (ref 23–29)
CREAT SERPL-MCNC: 1.5 MG/DL (ref 0.5–1.4)
DIFFERENTIAL METHOD: ABNORMAL
EOSINOPHIL # BLD AUTO: 0.2 K/UL (ref 0–0.5)
EOSINOPHIL NFR BLD: 3.3 % (ref 0–8)
ERYTHROCYTE [DISTWIDTH] IN BLOOD BY AUTOMATED COUNT: 15.2 % (ref 11.5–14.5)
EST. GFR  (AFRICAN AMERICAN): 51.9 ML/MIN/1.73 M^2
EST. GFR  (NON AFRICAN AMERICAN): 44.9 ML/MIN/1.73 M^2
ESTIMATED AVG GLUCOSE: 137 MG/DL (ref 68–131)
GLUCOSE SERPL-MCNC: 154 MG/DL (ref 70–110)
HBA1C MFR BLD: 6.4 % (ref 4–5.6)
HCT VFR BLD AUTO: 43.4 % (ref 40–54)
HDLC SERPL-MCNC: 34 MG/DL (ref 40–75)
HDLC SERPL: 25.2 % (ref 20–50)
HGB BLD-MCNC: 13.8 G/DL (ref 14–18)
IMM GRANULOCYTES # BLD AUTO: 0.04 K/UL (ref 0–0.04)
IMM GRANULOCYTES NFR BLD AUTO: 0.6 % (ref 0–0.5)
LDLC SERPL CALC-MCNC: 75.8 MG/DL (ref 63–159)
LYMPHOCYTES # BLD AUTO: 1.5 K/UL (ref 1–4.8)
LYMPHOCYTES NFR BLD: 20.9 % (ref 18–48)
MCH RBC QN AUTO: 28.1 PG (ref 27–31)
MCHC RBC AUTO-ENTMCNC: 31.8 G/DL (ref 32–36)
MCV RBC AUTO: 88 FL (ref 82–98)
MONOCYTES # BLD AUTO: 0.6 K/UL (ref 0.3–1)
MONOCYTES NFR BLD: 7.9 % (ref 4–15)
NEUTROPHILS # BLD AUTO: 4.8 K/UL (ref 1.8–7.7)
NEUTROPHILS NFR BLD: 66.7 % (ref 38–73)
NONHDLC SERPL-MCNC: 101 MG/DL
NRBC BLD-RTO: 0 /100 WBC
PLATELET # BLD AUTO: 183 K/UL (ref 150–450)
PMV BLD AUTO: 11.2 FL (ref 9.2–12.9)
POTASSIUM SERPL-SCNC: 4.7 MMOL/L (ref 3.5–5.1)
PROT SERPL-MCNC: 7.2 G/DL (ref 6–8.4)
RBC # BLD AUTO: 4.91 M/UL (ref 4.6–6.2)
SODIUM SERPL-SCNC: 141 MMOL/L (ref 136–145)
TRIGL SERPL-MCNC: 126 MG/DL (ref 30–150)
URATE SERPL-MCNC: 6 MG/DL (ref 3.4–7)
WBC # BLD AUTO: 7.24 K/UL (ref 3.9–12.7)

## 2021-07-21 PROCEDURE — 80053 COMPREHEN METABOLIC PANEL: CPT | Performed by: INTERNAL MEDICINE

## 2021-07-21 PROCEDURE — 83036 HEMOGLOBIN GLYCOSYLATED A1C: CPT | Performed by: INTERNAL MEDICINE

## 2021-07-21 PROCEDURE — 85025 COMPLETE CBC W/AUTO DIFF WBC: CPT | Performed by: INTERNAL MEDICINE

## 2021-07-21 PROCEDURE — 36415 COLL VENOUS BLD VENIPUNCTURE: CPT | Mod: PO | Performed by: INTERNAL MEDICINE

## 2021-07-21 PROCEDURE — 84550 ASSAY OF BLOOD/URIC ACID: CPT | Performed by: INTERNAL MEDICINE

## 2021-07-21 PROCEDURE — 80061 LIPID PANEL: CPT | Performed by: INTERNAL MEDICINE

## 2021-07-27 ENCOUNTER — PATIENT MESSAGE (OUTPATIENT)
Dept: FAMILY MEDICINE | Facility: CLINIC | Age: 76
End: 2021-07-27

## 2021-07-28 ENCOUNTER — LAB VISIT (OUTPATIENT)
Dept: LAB | Facility: HOSPITAL | Age: 76
End: 2021-07-28
Attending: INTERNAL MEDICINE
Payer: MEDICARE

## 2021-07-28 ENCOUNTER — OFFICE VISIT (OUTPATIENT)
Dept: FAMILY MEDICINE | Facility: CLINIC | Age: 76
End: 2021-07-28
Payer: MEDICARE

## 2021-07-28 VITALS
SYSTOLIC BLOOD PRESSURE: 124 MMHG | HEIGHT: 74 IN | BODY MASS INDEX: 38.76 KG/M2 | OXYGEN SATURATION: 97 % | TEMPERATURE: 98 F | HEART RATE: 80 BPM | RESPIRATION RATE: 18 BRPM | DIASTOLIC BLOOD PRESSURE: 78 MMHG | WEIGHT: 302 LBS

## 2021-07-28 DIAGNOSIS — E11.9 TYPE 2 DIABETES MELLITUS WITHOUT COMPLICATION, UNSPECIFIED WHETHER LONG TERM INSULIN USE: Primary | ICD-10-CM

## 2021-07-28 DIAGNOSIS — R60.0 LOCALIZED EDEMA: ICD-10-CM

## 2021-07-28 DIAGNOSIS — D64.9 ANEMIA, UNSPECIFIED TYPE: ICD-10-CM

## 2021-07-28 DIAGNOSIS — M10.49 OTHER SECONDARY ACUTE GOUT OF MULTIPLE SITES: ICD-10-CM

## 2021-07-28 DIAGNOSIS — I10 ESSENTIAL HYPERTENSION: ICD-10-CM

## 2021-07-28 DIAGNOSIS — E78.2 MIXED HYPERLIPIDEMIA: ICD-10-CM

## 2021-07-28 DIAGNOSIS — E66.01 CLASS 3 SEVERE OBESITY DUE TO EXCESS CALORIES WITH SERIOUS COMORBIDITY AND BODY MASS INDEX (BMI) OF 40.0 TO 44.9 IN ADULT: ICD-10-CM

## 2021-07-28 PROCEDURE — 99499 UNLISTED E&M SERVICE: CPT | Mod: HCNC,S$GLB,, | Performed by: INTERNAL MEDICINE

## 2021-07-28 PROCEDURE — 99999 PR PBB SHADOW E&M-EST. PATIENT-LVL V: CPT | Mod: PBBFAC,,, | Performed by: INTERNAL MEDICINE

## 2021-07-28 PROCEDURE — 3288F FALL RISK ASSESSMENT DOCD: CPT | Mod: CPTII,S$GLB,, | Performed by: INTERNAL MEDICINE

## 2021-07-28 PROCEDURE — 3078F DIAST BP <80 MM HG: CPT | Mod: CPTII,S$GLB,, | Performed by: INTERNAL MEDICINE

## 2021-07-28 PROCEDURE — 3074F PR MOST RECENT SYSTOLIC BLOOD PRESSURE < 130 MM HG: ICD-10-PCS | Mod: CPTII,S$GLB,, | Performed by: INTERNAL MEDICINE

## 2021-07-28 PROCEDURE — 82728 ASSAY OF FERRITIN: CPT | Performed by: INTERNAL MEDICINE

## 2021-07-28 PROCEDURE — 1125F AMNT PAIN NOTED PAIN PRSNT: CPT | Mod: CPTII,S$GLB,, | Performed by: INTERNAL MEDICINE

## 2021-07-28 PROCEDURE — 99397 PER PM REEVAL EST PAT 65+ YR: CPT | Mod: S$GLB,,, | Performed by: INTERNAL MEDICINE

## 2021-07-28 PROCEDURE — 1159F PR MEDICATION LIST DOCUMENTED IN MEDICAL RECORD: ICD-10-PCS | Mod: CPTII,S$GLB,, | Performed by: INTERNAL MEDICINE

## 2021-07-28 PROCEDURE — 3044F HG A1C LEVEL LT 7.0%: CPT | Mod: CPTII,S$GLB,, | Performed by: INTERNAL MEDICINE

## 2021-07-28 PROCEDURE — 99397 PR PREVENTIVE VISIT,EST,65 & OVER: ICD-10-PCS | Mod: S$GLB,,, | Performed by: INTERNAL MEDICINE

## 2021-07-28 PROCEDURE — 1160F RVW MEDS BY RX/DR IN RCRD: CPT | Mod: CPTII,S$GLB,, | Performed by: INTERNAL MEDICINE

## 2021-07-28 PROCEDURE — 3288F PR FALLS RISK ASSESSMENT DOCUMENTED: ICD-10-PCS | Mod: CPTII,S$GLB,, | Performed by: INTERNAL MEDICINE

## 2021-07-28 PROCEDURE — 83540 ASSAY OF IRON: CPT | Performed by: INTERNAL MEDICINE

## 2021-07-28 PROCEDURE — 1159F MED LIST DOCD IN RCRD: CPT | Mod: CPTII,S$GLB,, | Performed by: INTERNAL MEDICINE

## 2021-07-28 PROCEDURE — 1125F PR PAIN SEVERITY QUANTIFIED, PAIN PRESENT: ICD-10-PCS | Mod: CPTII,S$GLB,, | Performed by: INTERNAL MEDICINE

## 2021-07-28 PROCEDURE — 1160F PR REVIEW ALL MEDS BY PRESCRIBER/CLIN PHARMACIST DOCUMENTED: ICD-10-PCS | Mod: CPTII,S$GLB,, | Performed by: INTERNAL MEDICINE

## 2021-07-28 PROCEDURE — 3078F PR MOST RECENT DIASTOLIC BLOOD PRESSURE < 80 MM HG: ICD-10-PCS | Mod: CPTII,S$GLB,, | Performed by: INTERNAL MEDICINE

## 2021-07-28 PROCEDURE — 99999 PR PBB SHADOW E&M-EST. PATIENT-LVL V: ICD-10-PCS | Mod: PBBFAC,,, | Performed by: INTERNAL MEDICINE

## 2021-07-28 PROCEDURE — 3044F PR MOST RECENT HEMOGLOBIN A1C LEVEL <7.0%: ICD-10-PCS | Mod: CPTII,S$GLB,, | Performed by: INTERNAL MEDICINE

## 2021-07-28 PROCEDURE — 36415 COLL VENOUS BLD VENIPUNCTURE: CPT | Mod: PO | Performed by: INTERNAL MEDICINE

## 2021-07-28 PROCEDURE — 3074F SYST BP LT 130 MM HG: CPT | Mod: CPTII,S$GLB,, | Performed by: INTERNAL MEDICINE

## 2021-07-28 PROCEDURE — 1101F PT FALLS ASSESS-DOCD LE1/YR: CPT | Mod: CPTII,S$GLB,, | Performed by: INTERNAL MEDICINE

## 2021-07-28 PROCEDURE — 1101F PR PT FALLS ASSESS DOC 0-1 FALLS W/OUT INJ PAST YR: ICD-10-PCS | Mod: CPTII,S$GLB,, | Performed by: INTERNAL MEDICINE

## 2021-07-28 PROCEDURE — 99499 RISK ADDL DX/OHS AUDIT: ICD-10-PCS | Mod: HCNC,S$GLB,, | Performed by: INTERNAL MEDICINE

## 2021-07-28 RX ORDER — METFORMIN HYDROCHLORIDE 500 MG/1
500 TABLET, EXTENDED RELEASE ORAL 2 TIMES DAILY WITH MEALS
Qty: 180 TABLET | Refills: 3 | Status: SHIPPED | OUTPATIENT
Start: 2021-07-28 | End: 2022-08-01 | Stop reason: SDUPTHER

## 2021-07-29 LAB
FERRITIN SERPL-MCNC: 35 NG/ML (ref 20–300)
IRON SERPL-MCNC: 67 UG/DL (ref 45–160)
SATURATED IRON: 13 % (ref 20–50)
TOTAL IRON BINDING CAPACITY: 511 UG/DL (ref 250–450)
TRANSFERRIN SERPL-MCNC: 345 MG/DL (ref 200–375)

## 2021-11-29 ENCOUNTER — PATIENT MESSAGE (OUTPATIENT)
Dept: FAMILY MEDICINE | Facility: CLINIC | Age: 76
End: 2021-11-29
Payer: MEDICARE

## 2021-11-29 DIAGNOSIS — R60.0 EDEMA OF LEFT LOWER LEG: Primary | ICD-10-CM

## 2021-11-30 ENCOUNTER — HOSPITAL ENCOUNTER (OUTPATIENT)
Dept: RADIOLOGY | Facility: HOSPITAL | Age: 76
Discharge: HOME | End: 2021-11-30
Attending: INTERNAL MEDICINE
Payer: MEDICARE

## 2021-11-30 DIAGNOSIS — R60.0 EDEMA OF LEFT LOWER LEG: ICD-10-CM

## 2021-11-30 PROCEDURE — 93971 EXTREMITY STUDY: CPT | Mod: 26,HCNC,LT, | Performed by: RADIOLOGY

## 2021-11-30 PROCEDURE — 93971 EXTREMITY STUDY: CPT | Mod: TC,HCNC,PO,LT

## 2021-11-30 PROCEDURE — 93971 US LOWER EXTREMITY VEINS LEFT: ICD-10-PCS | Mod: 26,HCNC,LT, | Performed by: RADIOLOGY

## 2022-04-21 ENCOUNTER — PATIENT MESSAGE (OUTPATIENT)
Dept: ADMINISTRATIVE | Facility: HOSPITAL | Age: 77
End: 2022-04-21
Payer: MEDICARE

## 2022-05-04 ENCOUNTER — PATIENT MESSAGE (OUTPATIENT)
Dept: ADMINISTRATIVE | Facility: HOSPITAL | Age: 77
End: 2022-05-04
Payer: MEDICARE

## 2022-05-04 DIAGNOSIS — E11.9 TYPE 2 DIABETES MELLITUS WITHOUT COMPLICATION, UNSPECIFIED WHETHER LONG TERM INSULIN USE: ICD-10-CM

## 2022-06-06 LAB — CRC RECOMMENDATION EXT: NORMAL

## 2022-06-10 LAB
COMMENTS: ABNORMAL
EST. AVERAGE GLUCOSE BLD GHB EST-MCNC: 157 MG/DL
HBA1C MFR BLD: 7.1 % (ref 4.8–5.6)

## 2022-06-23 ENCOUNTER — TELEPHONE (OUTPATIENT)
Dept: FAMILY MEDICINE | Facility: CLINIC | Age: 77
End: 2022-06-23
Payer: MEDICARE

## 2022-09-09 ENCOUNTER — PATIENT MESSAGE (OUTPATIENT)
Dept: FAMILY MEDICINE | Facility: CLINIC | Age: 77
End: 2022-09-09
Payer: MEDICARE

## 2022-09-12 ENCOUNTER — LAB VISIT (OUTPATIENT)
Dept: LAB | Facility: HOSPITAL | Age: 77
End: 2022-09-12
Attending: INTERNAL MEDICINE
Payer: MEDICARE

## 2022-09-12 DIAGNOSIS — D64.9 ANEMIA, UNSPECIFIED TYPE: ICD-10-CM

## 2022-09-12 DIAGNOSIS — E11.9 TYPE 2 DIABETES MELLITUS WITHOUT COMPLICATION, UNSPECIFIED WHETHER LONG TERM INSULIN USE: ICD-10-CM

## 2022-09-12 LAB
BASOPHILS # BLD AUTO: 0.06 K/UL (ref 0–0.2)
BASOPHILS NFR BLD: 0.7 % (ref 0–1.9)
CHOLEST SERPL-MCNC: 141 MG/DL (ref 120–199)
CHOLEST/HDLC SERPL: 4.7 {RATIO} (ref 2–5)
DIFFERENTIAL METHOD: ABNORMAL
EOSINOPHIL # BLD AUTO: 0.2 K/UL (ref 0–0.5)
EOSINOPHIL NFR BLD: 2.5 % (ref 0–8)
ERYTHROCYTE [DISTWIDTH] IN BLOOD BY AUTOMATED COUNT: 14 % (ref 11.5–14.5)
ESTIMATED AVG GLUCOSE: 131 MG/DL (ref 68–131)
HBA1C MFR BLD: 6.2 % (ref 4–5.6)
HCT VFR BLD AUTO: 43.7 % (ref 40–54)
HDLC SERPL-MCNC: 30 MG/DL (ref 40–75)
HDLC SERPL: 21.3 % (ref 20–50)
HGB BLD-MCNC: 15 G/DL (ref 14–18)
IMM GRANULOCYTES # BLD AUTO: 0.07 K/UL (ref 0–0.04)
IMM GRANULOCYTES NFR BLD AUTO: 0.8 % (ref 0–0.5)
LDLC SERPL CALC-MCNC: 75.2 MG/DL (ref 63–159)
LYMPHOCYTES # BLD AUTO: 1.9 K/UL (ref 1–4.8)
LYMPHOCYTES NFR BLD: 21.7 % (ref 18–48)
MCH RBC QN AUTO: 30.1 PG (ref 27–31)
MCHC RBC AUTO-ENTMCNC: 34.3 G/DL (ref 32–36)
MCV RBC AUTO: 88 FL (ref 82–98)
MONOCYTES # BLD AUTO: 0.7 K/UL (ref 0.3–1)
MONOCYTES NFR BLD: 8.4 % (ref 4–15)
NEUTROPHILS # BLD AUTO: 5.7 K/UL (ref 1.8–7.7)
NEUTROPHILS NFR BLD: 65.9 % (ref 38–73)
NONHDLC SERPL-MCNC: 111 MG/DL
NRBC BLD-RTO: 0 /100 WBC
PLATELET # BLD AUTO: 178 K/UL (ref 150–450)
PMV BLD AUTO: 11.2 FL (ref 9.2–12.9)
RBC # BLD AUTO: 4.99 M/UL (ref 4.6–6.2)
TRIGL SERPL-MCNC: 179 MG/DL (ref 30–150)
WBC # BLD AUTO: 8.68 K/UL (ref 3.9–12.7)

## 2022-09-12 PROCEDURE — 85025 COMPLETE CBC W/AUTO DIFF WBC: CPT | Performed by: INTERNAL MEDICINE

## 2022-09-12 PROCEDURE — 83036 HEMOGLOBIN GLYCOSYLATED A1C: CPT | Performed by: INTERNAL MEDICINE

## 2022-09-12 PROCEDURE — 36415 COLL VENOUS BLD VENIPUNCTURE: CPT | Mod: PO | Performed by: INTERNAL MEDICINE

## 2022-09-12 PROCEDURE — 80061 LIPID PANEL: CPT | Performed by: INTERNAL MEDICINE

## 2022-09-14 ENCOUNTER — LAB VISIT (OUTPATIENT)
Dept: LAB | Facility: HOSPITAL | Age: 77
End: 2022-09-14
Attending: INTERNAL MEDICINE
Payer: MEDICARE

## 2022-09-14 ENCOUNTER — OFFICE VISIT (OUTPATIENT)
Dept: FAMILY MEDICINE | Facility: CLINIC | Age: 77
End: 2022-09-14
Payer: MEDICARE

## 2022-09-14 ENCOUNTER — PATIENT OUTREACH (OUTPATIENT)
Dept: ADMINISTRATIVE | Facility: HOSPITAL | Age: 77
End: 2022-09-14
Payer: MEDICARE

## 2022-09-14 VITALS
HEIGHT: 74 IN | OXYGEN SATURATION: 98 % | BODY MASS INDEX: 38.11 KG/M2 | DIASTOLIC BLOOD PRESSURE: 84 MMHG | HEART RATE: 81 BPM | SYSTOLIC BLOOD PRESSURE: 136 MMHG | RESPIRATION RATE: 18 BRPM | WEIGHT: 296.94 LBS

## 2022-09-14 DIAGNOSIS — E66.01 CLASS 3 SEVERE OBESITY DUE TO EXCESS CALORIES WITH SERIOUS COMORBIDITY AND BODY MASS INDEX (BMI) OF 40.0 TO 44.9 IN ADULT: ICD-10-CM

## 2022-09-14 DIAGNOSIS — K22.4 DIFFUSE ESOPHAGEAL SPASM: ICD-10-CM

## 2022-09-14 DIAGNOSIS — I48.0 PAROXYSMAL ATRIAL FIBRILLATION: ICD-10-CM

## 2022-09-14 DIAGNOSIS — Z12.5 PROSTATE CANCER SCREENING: ICD-10-CM

## 2022-09-14 DIAGNOSIS — Z00.00 WELLNESS EXAMINATION: Primary | ICD-10-CM

## 2022-09-14 DIAGNOSIS — I20.9 ANGINA PECTORIS: ICD-10-CM

## 2022-09-14 DIAGNOSIS — E78.2 MIXED HYPERLIPIDEMIA: ICD-10-CM

## 2022-09-14 DIAGNOSIS — I25.10 CORONARY ARTERY DISEASE DUE TO LIPID RICH PLAQUE: ICD-10-CM

## 2022-09-14 DIAGNOSIS — I25.83 CORONARY ARTERY DISEASE DUE TO LIPID RICH PLAQUE: ICD-10-CM

## 2022-09-14 DIAGNOSIS — I44.1 HEART BLOCK AV SECOND DEGREE: ICD-10-CM

## 2022-09-14 DIAGNOSIS — I10 ESSENTIAL HYPERTENSION: ICD-10-CM

## 2022-09-14 DIAGNOSIS — E11.9 TYPE 2 DIABETES MELLITUS WITHOUT COMPLICATION, UNSPECIFIED WHETHER LONG TERM INSULIN USE: ICD-10-CM

## 2022-09-14 DIAGNOSIS — I71.21 ASCENDING AORTIC ANEURYSM: ICD-10-CM

## 2022-09-14 LAB
ALBUMIN/CREAT UR: ABNORMAL UG/MG (ref 0–30)
CREAT UR-MCNC: 11 MG/DL (ref 23–375)
MICROALBUMIN UR DL<=1MG/L-MCNC: <5 UG/ML

## 2022-09-14 PROCEDURE — 99214 OFFICE O/P EST MOD 30 MIN: CPT | Mod: S$GLB,,, | Performed by: INTERNAL MEDICINE

## 2022-09-14 PROCEDURE — 3288F FALL RISK ASSESSMENT DOCD: CPT | Mod: CPTII,S$GLB,, | Performed by: INTERNAL MEDICINE

## 2022-09-14 PROCEDURE — 1160F RVW MEDS BY RX/DR IN RCRD: CPT | Mod: CPTII,S$GLB,, | Performed by: INTERNAL MEDICINE

## 2022-09-14 PROCEDURE — G0008 FLU VACCINE - QUADRIVALENT - ADJUVANTED: ICD-10-PCS | Mod: S$GLB,,, | Performed by: INTERNAL MEDICINE

## 2022-09-14 PROCEDURE — 90694 VACC AIIV4 NO PRSRV 0.5ML IM: CPT | Mod: S$GLB,,, | Performed by: INTERNAL MEDICINE

## 2022-09-14 PROCEDURE — 1159F PR MEDICATION LIST DOCUMENTED IN MEDICAL RECORD: ICD-10-PCS | Mod: CPTII,S$GLB,, | Performed by: INTERNAL MEDICINE

## 2022-09-14 PROCEDURE — 3079F DIAST BP 80-89 MM HG: CPT | Mod: CPTII,S$GLB,, | Performed by: INTERNAL MEDICINE

## 2022-09-14 PROCEDURE — 1159F MED LIST DOCD IN RCRD: CPT | Mod: CPTII,S$GLB,, | Performed by: INTERNAL MEDICINE

## 2022-09-14 PROCEDURE — 99999 PR PBB SHADOW E&M-EST. PATIENT-LVL IV: ICD-10-PCS | Mod: PBBFAC,,, | Performed by: INTERNAL MEDICINE

## 2022-09-14 PROCEDURE — 3079F PR MOST RECENT DIASTOLIC BLOOD PRESSURE 80-89 MM HG: ICD-10-PCS | Mod: CPTII,S$GLB,, | Performed by: INTERNAL MEDICINE

## 2022-09-14 PROCEDURE — 1101F PT FALLS ASSESS-DOCD LE1/YR: CPT | Mod: CPTII,S$GLB,, | Performed by: INTERNAL MEDICINE

## 2022-09-14 PROCEDURE — 3288F PR FALLS RISK ASSESSMENT DOCUMENTED: ICD-10-PCS | Mod: CPTII,S$GLB,, | Performed by: INTERNAL MEDICINE

## 2022-09-14 PROCEDURE — 1126F AMNT PAIN NOTED NONE PRSNT: CPT | Mod: CPTII,S$GLB,, | Performed by: INTERNAL MEDICINE

## 2022-09-14 PROCEDURE — 82043 UR ALBUMIN QUANTITATIVE: CPT | Performed by: INTERNAL MEDICINE

## 2022-09-14 PROCEDURE — 1101F PR PT FALLS ASSESS DOC 0-1 FALLS W/OUT INJ PAST YR: ICD-10-PCS | Mod: CPTII,S$GLB,, | Performed by: INTERNAL MEDICINE

## 2022-09-14 PROCEDURE — 1126F PR PAIN SEVERITY QUANTIFIED, NO PAIN PRESENT: ICD-10-PCS | Mod: CPTII,S$GLB,, | Performed by: INTERNAL MEDICINE

## 2022-09-14 PROCEDURE — 1160F PR REVIEW ALL MEDS BY PRESCRIBER/CLIN PHARMACIST DOCUMENTED: ICD-10-PCS | Mod: CPTII,S$GLB,, | Performed by: INTERNAL MEDICINE

## 2022-09-14 PROCEDURE — 90694 FLU VACCINE - QUADRIVALENT - ADJUVANTED: ICD-10-PCS | Mod: S$GLB,,, | Performed by: INTERNAL MEDICINE

## 2022-09-14 PROCEDURE — 82570 ASSAY OF URINE CREATININE: CPT | Performed by: INTERNAL MEDICINE

## 2022-09-14 PROCEDURE — 99999 PR PBB SHADOW E&M-EST. PATIENT-LVL IV: CPT | Mod: PBBFAC,,, | Performed by: INTERNAL MEDICINE

## 2022-09-14 PROCEDURE — 99499 UNLISTED E&M SERVICE: CPT | Mod: HCNC,S$GLB,, | Performed by: INTERNAL MEDICINE

## 2022-09-14 PROCEDURE — 3075F SYST BP GE 130 - 139MM HG: CPT | Mod: CPTII,S$GLB,, | Performed by: INTERNAL MEDICINE

## 2022-09-14 PROCEDURE — 99214 PR OFFICE/OUTPT VISIT, EST, LEVL IV, 30-39 MIN: ICD-10-PCS | Mod: S$GLB,,, | Performed by: INTERNAL MEDICINE

## 2022-09-14 PROCEDURE — 3075F PR MOST RECENT SYSTOLIC BLOOD PRESS GE 130-139MM HG: ICD-10-PCS | Mod: CPTII,S$GLB,, | Performed by: INTERNAL MEDICINE

## 2022-09-14 PROCEDURE — G0008 ADMIN INFLUENZA VIRUS VAC: HCPCS | Mod: S$GLB,,, | Performed by: INTERNAL MEDICINE

## 2022-09-14 RX ORDER — DICYCLOMINE HYDROCHLORIDE 10 MG/1
CAPSULE ORAL
Status: ON HOLD | COMMUNITY
Start: 2022-04-21 | End: 2023-01-17 | Stop reason: CLARIF

## 2022-09-14 NOTE — PROGRESS NOTES
Patient ID: Nate Bah     Chief Complaint:   Chief Complaint   Patient presents with    Annual Exam        HPI:  Annual exam and doing very well overall.  I did review his labs and his diabetes is very well controlled with an A1c of 6.2.  His LDL is also very well controlled at 75.  He does need a urine microalbumin to creatinine ratio which we will get today.  His last diabetic eye exam was done at a local ophthalmologist office and I will get that report.  He is interested in updated COVID vaccine and I recommend he get 1 from the pharmacy in about a week since he just got the flu shot today.  He does need a CTA of his chest and abdomen to monitor a suspected ascending aortic aneurysm.  Vital signs do look good.  He does have a living will and medical power  in place of his home and I would like him to mail me a copy.  He does get chest pain from esophageal spasms but this should resolve with time and nitroglycerin.    Review of Systems   Constitutional: Negative.    HENT: Negative.     Eyes: Negative.    Respiratory: Negative.     Cardiovascular: Negative.    Gastrointestinal: Negative.    Endocrine: Negative.    Genitourinary: Negative.    Musculoskeletal: Negative.    Skin: Negative.    Allergic/Immunologic: Negative.    Neurological: Negative.    Hematological: Negative.    Psychiatric/Behavioral: Negative.          Objective:      Physical Exam   Physical Exam  Vitals and nursing note reviewed.   Constitutional:       Appearance: Normal appearance. He is well-developed. He is obese.   HENT:      Head: Normocephalic and atraumatic.      Nose: Nose normal.   Eyes:      Extraocular Movements: Extraocular movements intact.      Conjunctiva/sclera: Conjunctivae normal.      Pupils: Pupils are equal, round, and reactive to light.   Cardiovascular:      Rate and Rhythm: Normal rate and regular rhythm.      Pulses: Normal pulses.      Heart sounds: Normal heart sounds.   Pulmonary:      Effort:  "Pulmonary effort is normal.      Breath sounds: Normal breath sounds.   Abdominal:      General: Bowel sounds are normal.      Palpations: Abdomen is soft.   Musculoskeletal:         General: Normal range of motion.      Cervical back: Normal range of motion and neck supple.   Skin:     General: Skin is warm and dry.      Capillary Refill: Capillary refill takes less than 2 seconds.   Neurological:      General: No focal deficit present.      Mental Status: He is alert and oriented to person, place, and time.   Psychiatric:         Mood and Affect: Mood normal.         Behavior: Behavior normal.         Thought Content: Thought content normal.         Judgment: Judgment normal.      Protective Sensation (w/ 10 gram monofilament):  Right: Intact  Left: Intact    Visual Inspection:  Normal -  Bilateral    Pedal Pulses:   Right: Present  Left: Present    Posterior tibialis:   Right:Present  Left: Present       Vitals:   Vitals:    09/14/22 0937   BP: 136/84   BP Location: Left arm   Patient Position: Sitting   BP Method: Medium (Manual)   Pulse: 81   Resp: 18   SpO2: 98%   Weight: 134.7 kg (296 lb 15.4 oz)   Height: 6' 2" (1.88 m)          Current Outpatient Medications:     allopurinoL (ZYLOPRIM) 300 MG tablet, Take 1 tablet (300 mg total) by mouth once daily., Disp: 90 tablet, Rfl: 3    ascorbic acid (MURALI-C ORAL), Take 250 mg by mouth once daily., Disp: , Rfl:     aspirin (ECOTRIN) 81 MG EC tablet, Take 81 mg by mouth once daily., Disp: , Rfl:     atorvastatin (LIPITOR) 80 MG tablet, Take 1 tablet (80 mg total) by mouth every other day., Disp: 45 tablet, Rfl: 3    clopidogreL (PLAVIX) 75 mg tablet, TAKE 1 TABLET EVERY DAY, Disp: 90 tablet, Rfl: 3    docusate sodium (COLACE) 100 MG capsule, Take 100 mg by mouth 2 (two) times daily., Disp: , Rfl:     ergocalciferol, vitamin D2, (VITAMIN D ORAL), Take 400 mg by mouth once daily., Disp: , Rfl:     furosemide (LASIX) 40 MG tablet, TAKE 1 TABLET EVERY DAY, Disp: 90 " tablet, Rfl: 3    glipiZIDE (GLUCOTROL) 5 MG tablet, TAKE 1 TABLET EVERY DAY, Disp: 90 tablet, Rfl: 3    isosorbide mononitrate (IMDUR) 60 MG 24 hr tablet, Take 1 tablet (60 mg total) by mouth every evening., Disp: 30 tablet, Rfl: 11    losartan (COZAAR) 100 MG tablet, TAKE 1 TABLET EVERY DAY, Disp: 90 tablet, Rfl: 3    MAGNESIUM ORAL, Take 400 mg by mouth every evening. , Disp: , Rfl:     metFORMIN (GLUCOPHAGE-XR) 500 MG ER 24hr tablet, Take 1 tablet (500 mg total) by mouth 2 (two) times daily with meals., Disp: 180 tablet, Rfl: 3    pantoprazole (PROTONIX) 40 MG tablet, TAKE 1 TABLET EVERY DAY, Disp: 90 tablet, Rfl: 3    dicyclomine (BENTYL) 10 MG capsule, TAKE 1 CAPSULE BY MOUTH 3 TIMES A DAY BEFORE MEALS AND AS NEEDED FOR 30 DAYS., Disp: , Rfl:     nitroGLYCERIN (NITROSTAT) 0.4 MG SL tablet, Place 1 tablet (0.4 mg total) under the tongue every 5 (five) minutes as needed for Chest pain. 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    ACP (advance care planning) 05/02/2021    Peripheral vascular disease, unspecified 01/28/2021    Perirectal abscess 03/25/2020    Class 3 severe obesity due to excess calories with serious comorbidity and body mass index (BMI) of 40.0 to 44.9 in adult 01/22/2020    Wheezing 01/22/2020    Localized edema 01/22/2020    Hammer toes of both feet 08/12/2019    Diabetic polyneuropathy associated with type 2 diabetes mellitus 08/12/2019    Prostate cancer screening 07/19/2019    Mixed hyperlipidemia     Edema     BPH (benign prostatic hyperplasia)     Primary osteoarthritis of left knee 02/07/2018    Type 2 diabetes mellitus without complication 11/16/2017    Diffuse esophageal spasm 12/04/2016    Benign prostate hyperplasia 05/11/2016    Gout 09/29/2014    Paroxysmal atrial fibrillation 05/29/2014    Coronary  artery disease + Ectasia, h/o CABG & stentsCABG x 1, LIMA to LAD 8/2003 [facility], Stent RCA 2002, stent LCx 7/09, stent ostial LAD, LCx 11/11, 7/18 stent prox LAD 4.0 x 28 Synergy 12/20/2010    Essential hypertension 12/20/2010    Gastroesophageal reflux disease without esophagitis 08/20/2010          Plan:       Nate Bah  was seen today for follow-up and may need lab work.    Diagnoses and all orders for this visit:    Nate was seen today for annual exam.    Diagnoses and all orders for this visit:    Wellness examination    Type 2 diabetes mellitus without complication, unspecified whether long term insulin use  -     Microalbumin/Creatinine Ratio, Urine; Future  Controlled     Angina pectoris  Due to esophageal spasms    Mixed hyperlipidemia  LDL Controlled    Coronary artery disease + Ectasia, h/o CABG & stentsCABG x 1, LIMA to LAD 8/2003 [facility], Stent RCA 2002, stent LCx 7/09, stent ostial LAD, LCx 11/11, 7/18 stent prox LAD 4.0 x 28 Synergy  Stable    Heart block AV second degree  S/p PPM     Paroxysmal atrial fibrillation  Controlled    Essential hypertension  Controlled with med     Class 3 severe obesity due to excess calories with serious comorbidity and body mass index (BMI) of 40.0 to 44.9 in adult  Monitor    Diffuse esophageal spasm  Monitor    Ascending aortic aneurysm  -     CTA Chest Abdomen Non Coronary; Future    Other orders  -     Influenza - Quadrivalent (Adjuvanted)

## 2022-09-22 DIAGNOSIS — I71.21 ASCENDING AORTIC ANEURYSM: Primary | ICD-10-CM

## 2022-09-23 ENCOUNTER — HOSPITAL ENCOUNTER (OUTPATIENT)
Dept: RADIOLOGY | Facility: HOSPITAL | Age: 77
Discharge: HOME | End: 2022-09-23
Attending: INTERNAL MEDICINE
Payer: MEDICARE

## 2022-09-23 DIAGNOSIS — I71.21 ASCENDING AORTIC ANEURYSM: ICD-10-CM

## 2022-09-23 PROCEDURE — 74175 CTA CHEST ABDOMEN NON CORONARY (XPD): ICD-10-PCS | Mod: 26,,, | Performed by: RADIOLOGY

## 2022-09-23 PROCEDURE — 71275 CT ANGIOGRAPHY CHEST: CPT | Mod: 26,,, | Performed by: RADIOLOGY

## 2022-09-23 PROCEDURE — 25500020 PHARM REV CODE 255: Mod: PO | Performed by: INTERNAL MEDICINE

## 2022-09-23 PROCEDURE — 71275 CT ANGIOGRAPHY CHEST: CPT | Mod: TC,PO

## 2022-09-23 PROCEDURE — 71275 CTA CHEST ABDOMEN NON CORONARY (XPD): ICD-10-PCS | Mod: 26,,, | Performed by: RADIOLOGY

## 2022-09-23 PROCEDURE — 74175 CTA ABDOMEN W/CONTRAST: CPT | Mod: 26,,, | Performed by: RADIOLOGY

## 2022-09-23 RX ADMIN — IOHEXOL 100 ML: 350 INJECTION, SOLUTION INTRAVENOUS at 02:09

## 2022-10-17 ENCOUNTER — PATIENT OUTREACH (OUTPATIENT)
Dept: ADMINISTRATIVE | Facility: HOSPITAL | Age: 77
End: 2022-10-17
Payer: MEDICARE

## 2022-10-18 ENCOUNTER — PATIENT OUTREACH (OUTPATIENT)
Dept: ADMINISTRATIVE | Facility: HOSPITAL | Age: 77
End: 2022-10-18
Payer: MEDICARE

## 2023-01-17 PROBLEM — E86.1 INTRAVASCULAR VOLUME DEPLETION: Status: ACTIVE | Noted: 2023-01-17

## 2023-01-17 PROBLEM — A02.0 SALMONELLA GASTROENTERITIS: Status: ACTIVE | Noted: 2023-01-17

## 2023-01-18 PROBLEM — R78.81 BACTEREMIA: Status: ACTIVE | Noted: 2023-01-18

## 2023-01-23 ENCOUNTER — PATIENT MESSAGE (OUTPATIENT)
Dept: FAMILY MEDICINE | Facility: CLINIC | Age: 78
End: 2023-01-23
Payer: MEDICARE

## 2023-01-27 ENCOUNTER — OFFICE VISIT (OUTPATIENT)
Dept: FAMILY MEDICINE | Facility: CLINIC | Age: 78
End: 2023-01-27
Payer: MEDICARE

## 2023-01-27 VITALS
DIASTOLIC BLOOD PRESSURE: 68 MMHG | SYSTOLIC BLOOD PRESSURE: 108 MMHG | WEIGHT: 283.94 LBS | HEART RATE: 52 BPM | BODY MASS INDEX: 36.46 KG/M2 | OXYGEN SATURATION: 98 %

## 2023-01-27 DIAGNOSIS — N17.9 AKI (ACUTE KIDNEY INJURY): ICD-10-CM

## 2023-01-27 DIAGNOSIS — E11.9 TYPE 2 DIABETES MELLITUS WITHOUT COMPLICATION, WITHOUT LONG-TERM CURRENT USE OF INSULIN: ICD-10-CM

## 2023-01-27 DIAGNOSIS — I48.0 PAROXYSMAL ATRIAL FIBRILLATION: ICD-10-CM

## 2023-01-27 DIAGNOSIS — I10 ESSENTIAL HYPERTENSION: ICD-10-CM

## 2023-01-27 DIAGNOSIS — E66.01 CLASS 3 SEVERE OBESITY DUE TO EXCESS CALORIES WITH SERIOUS COMORBIDITY AND BODY MASS INDEX (BMI) OF 40.0 TO 44.9 IN ADULT: ICD-10-CM

## 2023-01-27 DIAGNOSIS — A02.0 SALMONELLA GASTROENTERITIS: ICD-10-CM

## 2023-01-27 DIAGNOSIS — R78.81 SALMONELLA BACTEREMIA: Primary | ICD-10-CM

## 2023-01-27 PROCEDURE — 3078F DIAST BP <80 MM HG: CPT | Mod: HCNC,CPTII,S$GLB, | Performed by: INTERNAL MEDICINE

## 2023-01-27 PROCEDURE — 1126F PR PAIN SEVERITY QUANTIFIED, NO PAIN PRESENT: ICD-10-PCS | Mod: HCNC,CPTII,S$GLB, | Performed by: INTERNAL MEDICINE

## 2023-01-27 PROCEDURE — 1159F MED LIST DOCD IN RCRD: CPT | Mod: HCNC,CPTII,S$GLB, | Performed by: INTERNAL MEDICINE

## 2023-01-27 PROCEDURE — 3074F PR MOST RECENT SYSTOLIC BLOOD PRESSURE < 130 MM HG: ICD-10-PCS | Mod: HCNC,CPTII,S$GLB, | Performed by: INTERNAL MEDICINE

## 2023-01-27 PROCEDURE — 99999 PR PBB SHADOW E&M-EST. PATIENT-LVL IV: ICD-10-PCS | Mod: PBBFAC,HCNC,, | Performed by: INTERNAL MEDICINE

## 2023-01-27 PROCEDURE — 1126F AMNT PAIN NOTED NONE PRSNT: CPT | Mod: HCNC,CPTII,S$GLB, | Performed by: INTERNAL MEDICINE

## 2023-01-27 PROCEDURE — 3074F SYST BP LT 130 MM HG: CPT | Mod: HCNC,CPTII,S$GLB, | Performed by: INTERNAL MEDICINE

## 2023-01-27 PROCEDURE — 3078F PR MOST RECENT DIASTOLIC BLOOD PRESSURE < 80 MM HG: ICD-10-PCS | Mod: HCNC,CPTII,S$GLB, | Performed by: INTERNAL MEDICINE

## 2023-01-27 PROCEDURE — 99999 PR PBB SHADOW E&M-EST. PATIENT-LVL IV: CPT | Mod: PBBFAC,HCNC,, | Performed by: INTERNAL MEDICINE

## 2023-01-27 PROCEDURE — 99214 PR OFFICE/OUTPT VISIT, EST, LEVL IV, 30-39 MIN: ICD-10-PCS | Mod: HCNC,S$GLB,, | Performed by: INTERNAL MEDICINE

## 2023-01-27 PROCEDURE — 99499 UNLISTED E&M SERVICE: CPT | Mod: HCNC,S$GLB,, | Performed by: INTERNAL MEDICINE

## 2023-01-27 PROCEDURE — 1159F PR MEDICATION LIST DOCUMENTED IN MEDICAL RECORD: ICD-10-PCS | Mod: HCNC,CPTII,S$GLB, | Performed by: INTERNAL MEDICINE

## 2023-01-27 PROCEDURE — 1160F RVW MEDS BY RX/DR IN RCRD: CPT | Mod: HCNC,CPTII,S$GLB, | Performed by: INTERNAL MEDICINE

## 2023-01-27 PROCEDURE — 99214 OFFICE O/P EST MOD 30 MIN: CPT | Mod: HCNC,S$GLB,, | Performed by: INTERNAL MEDICINE

## 2023-01-27 PROCEDURE — 99499 RISK ADDL DX/OHS AUDIT: ICD-10-PCS | Mod: HCNC,S$GLB,, | Performed by: INTERNAL MEDICINE

## 2023-01-27 PROCEDURE — 1160F PR REVIEW ALL MEDS BY PRESCRIBER/CLIN PHARMACIST DOCUMENTED: ICD-10-PCS | Mod: HCNC,CPTII,S$GLB, | Performed by: INTERNAL MEDICINE

## 2023-01-27 NOTE — PROGRESS NOTES
Patient ID: Nate Bah     Chief Complaint:   Chief Complaint   Patient presents with    Hospital Follow Up        HPI:  Hospital follow-up where he went initially for uncontrollable diarrhea.  That was on January 15th.  Workup in the ER showed that his sodium was slightly low as was his potassium and his creatinine was near his baseline.  His family who was in attendance was more concerned about impending dehydration so he did get some IV fluids but was eventually discharged home.  Over the next 48 hours though he did not improve.  The stool culture that they obtained in the ER on January 15th did pop positive for Salmonella species in the afternoon of January 17th.  By chance on that day he really took a turn for the worse.  His blood pressure was extremely low and his family rightly called EMS.  Reports in the computer where his blood pressure was 80/50 and he was definitely encephalopathic.  Once in the ER he was diagnosed with sepsis and his creatinine had risen to 5.6.  He was aggressively fluid resuscitated and put on antibiotics for Salmonella which eventually did grow in the blood as well.  We are still unclear where the salmonella came from but the going theory is that it was from some tainted food [place] a casino that he had frequent add some days before.  Over the next few days in the hospital he gradually improved.  Blood pressure medicines and metformin and Lasix were all held while he was fluid resuscitated I definitely agree with that.  Kidney function did return back down to 1.2 which is very good his last potassium was just slightly low at 3.4 which should correct with time.  Blood pressure stabilized and he was transitioned from IV ciprofloxacin to oral ciprofloxacin.  His diarrhea though persisted.  He was checked for C diff that was negative twice.  We can only assume he does have an antibiotic associated diarrhea so Questran was started.  Today though he still has frequent bowel movements that  have not yet firmed up.  I do want him to continue probiotic with the Questran and finishes antibiotics but I have a feeling that his diarrhea will not resolve until the antibiotics stop.  I do want him to keep hydrated and I want him to not take Lasix or losartan or metformin and he can stop his aspirin since he is already on Plavix at this time.  As time goes on he may need some these medications back but I do not want overmedicated at this time.  I typically hold all blood pressure medicines until the systolic blood pressure gets to at least 140.  I would like him to rehydrate orally until he is urinating a pale yellow.  He does have a follow-up with Infectious Disease in the next few weeks and I want him to keep that appointment.    Review of Systems   Constitutional: Negative.    HENT: Negative.     Eyes: Negative.    Respiratory: Negative.     Cardiovascular: Negative.    Gastrointestinal:  Positive for diarrhea.   Endocrine: Negative.    Genitourinary: Negative.    Musculoskeletal: Negative.    Skin: Negative.    Allergic/Immunologic: Negative.    Neurological: Negative.    Hematological: Negative.    Psychiatric/Behavioral: Negative.          Objective:      Physical Exam   Physical Exam  Vitals and nursing note reviewed.   Constitutional:       Appearance: Normal appearance. He is well-developed. He is obese.   HENT:      Head: Normocephalic and atraumatic.      Nose: Nose normal.      Mouth/Throat:      Mouth: Mucous membranes are moist.   Eyes:      Extraocular Movements: Extraocular movements intact.      Conjunctiva/sclera: Conjunctivae normal.      Pupils: Pupils are equal, round, and reactive to light.   Cardiovascular:      Rate and Rhythm: Normal rate and regular rhythm.      Pulses: Normal pulses.      Heart sounds: Normal heart sounds.   Pulmonary:      Effort: Pulmonary effort is normal.      Breath sounds: Normal breath sounds.   Abdominal:      General: Bowel sounds are normal.      Palpations:  Abdomen is soft.   Musculoskeletal:         General: Normal range of motion.      Cervical back: Normal range of motion and neck supple.   Skin:     General: Skin is warm and dry.      Capillary Refill: Capillary refill takes less than 2 seconds.   Neurological:      General: No focal deficit present.      Mental Status: He is alert and oriented to person, place, and time.   Psychiatric:         Mood and Affect: Mood normal.         Behavior: Behavior normal.         Thought Content: Thought content normal.         Judgment: Judgment normal.          Vitals:   Vitals:    01/27/23 0905   BP: 108/68   Patient Position: Sitting   Pulse: (!) 52   SpO2: 98%   Weight: 128.8 kg (283 lb 15.2 oz)          Current Outpatient Medications:     allopurinoL (ZYLOPRIM) 300 MG tablet, Take 1 tablet (300 mg total) by mouth once daily., Disp: 90 tablet, Rfl: 3    ascorbic acid (MURALI-C ORAL), Take 250 mg by mouth once daily., Disp: , Rfl:     aspirin (ECOTRIN) 81 MG EC tablet, Take 81 mg by mouth once daily., Disp: , Rfl:     atorvastatin (LIPITOR) 80 MG tablet, Take 1 tablet (80 mg total) by mouth Every 3 (three) days., Disp: , Rfl:     cholestyramine-aspartame (QUESTRAN LIGHT) 4 gram PwPk, Take 1 packet (4 g total) by mouth 2 (two) times daily. for 10 days, Disp: 20 packet, Rfl: 0    ciprofloxacin HCl (CIPRO) 500 MG tablet, Take 1 tablet (500 mg total) by mouth every 12 (twelve) hours. for 12 days, Disp: 24 tablet, Rfl: 0    clopidogreL (PLAVIX) 75 mg tablet, TAKE 1 TABLET EVERY DAY (Patient taking differently: Take 75 mg by mouth once daily.), Disp: 90 tablet, Rfl: 3    docusate sodium (COLACE) 100 MG capsule, Take 100 mg by mouth 2 (two) times daily., Disp: , Rfl:     ergocalciferol, vitamin D2, (VITAMIN D ORAL), Take 400 mg by mouth once daily., Disp: , Rfl:     furosemide (LASIX) 40 MG tablet, TAKE 1 TABLET EVERY DAY (Patient taking differently: Take 40 mg by mouth once daily.), Disp: 90 tablet, Rfl: 3    glipiZIDE (GLUCOTROL) 5  MG tablet, TAKE 1 TABLET EVERY DAY (Patient taking differently: Take 5 mg by mouth once daily. ADMINISTER 30 MINUTES BEFORE MEAL(S).), Disp: 90 tablet, Rfl: 3    isosorbide mononitrate (IMDUR) 60 MG 24 hr tablet, Take 1 tablet (60 mg total) by mouth every evening., Disp: 30 tablet, Rfl: 11    Lactobacillus rhamnosus GG (CULTURELLE) 10 billion cell capsule, Take 1 capsule by mouth once daily., Disp: 30 capsule, Rfl: 0    losartan (COZAAR) 100 MG tablet, TAKE 1 TABLET EVERY DAY (Patient taking differently: Take 100 mg by mouth once daily.), Disp: 90 tablet, Rfl: 3    MAGNESIUM ORAL, Take 400 mg by mouth every evening. , Disp: , Rfl:     nitroGLYCERIN (NITROSTAT) 0.4 MG SL tablet, PLACE 1 TABLET (0.4 MG TOTAL) UNDER THE TONGUE EVERY 5 MINUTES AS NEEDED FOR CHEST PAIN. MAX 3 DOSES. CALL EMERGENCY SERVICES. (Patient taking differently: Place 0.4 mg under the tongue every 5 (five) minutes as needed for Chest pain (x 3 doses).), Disp: 25 tablet, Rfl: 3    pantoprazole (PROTONIX) 40 MG tablet, Take 1 tablet (40 mg total) by mouth once daily., Disp: 90 tablet, Rfl: 3    dicyclomine (BENTYL) 20 mg tablet, Take 1 tablet (20 mg total) by mouth 2 (two) times daily as needed (abdominal cramping). (Patient not taking: Reported on 1/27/2023), Disp: 12 tablet, Rfl: 0    metFORMIN (GLUCOPHAGE-XR) 500 MG ER 24hr tablet, Take 500 mg by mouth 2 (two) times daily with meals., Disp: , Rfl:    Assessment:       Patient Active Problem List    Diagnosis Date Noted    Bacteremia 01/18/2023    Salmonella gastroenteritis 01/17/2023    Intravascular volume depletion 01/17/2023    Anemia 07/28/2021    Heart block AV second degree 05/02/2021    ACP (advance care planning) 05/02/2021    Peripheral vascular disease, unspecified 01/28/2021    Perirectal abscess 03/25/2020    Class 3 severe obesity due to excess calories with serious comorbidity and body mass index (BMI) of 40.0 to 44.9 in adult 01/22/2020    Wheezing 01/22/2020    Localized edema  01/22/2020    Hammer toes of both feet 08/12/2019    Diabetic polyneuropathy associated with type 2 diabetes mellitus 08/12/2019    Prostate cancer screening 07/19/2019    Mixed hyperlipidemia     Edema     BPH (benign prostatic hyperplasia)     Primary osteoarthritis of left knee 02/07/2018    Type 2 diabetes mellitus without complication 11/16/2017    JACKSON (acute kidney injury) 12/04/2016    Diffuse esophageal spasm 12/04/2016    Benign prostate hyperplasia 05/11/2016    Gout 09/29/2014    Paroxysmal atrial fibrillation 05/29/2014    Coronary artery disease + Ectasia, h/o CABG & stentsCABG x 1, LIMA to LAD 8/2003 [facility], Stent RCA 2002, stent LCx 7/09, stent ostial LAD, LCx 11/11, 7/18 stent prox LAD 4.0 x 28 Synergy 12/20/2010    Essential hypertension 12/20/2010    Gastroesophageal reflux disease without esophagitis 08/20/2010          Plan:       Nate Bah  was seen today for follow-up and may need lab work.    Diagnoses and all orders for this visit:    Nate was seen today for hospital follow up.    Diagnoses and all orders for this visit:    Salmonella bacteremia  Resolved with antibiotics     JACKSON (acute kidney injury)  -     CBC Auto Differential; Future  -     Comprehensive Metabolic Panel; Future  -     Magnesium; Future  Improved  Stop Lasix for now due to continued diarrhea     Paroxysmal atrial fibrillation  Controlled     Essential hypertension  Stop losartan for now due to lower Blood Pressure     Type 2 diabetes mellitus without complication, without long-term current use of insulin  Stop Metformin and Glimepiride for now      Class 3 severe obesity due to excess calories with serious comorbidity and body mass index (BMI) of 40.0 to 44.9 in adult  Monitor     Salmonella gastroenteritis  Continue Cipro and Questran

## 2023-01-27 NOTE — PROGRESS NOTES
Patient, Nate Bah (MRN #[id number]), presented with a recent Estimated Glumerular Filtration Rate (EGFR) between 30 and 45 consistent with the definition of chronic kidney disease stage 3 - moderate (ICD10 - N18.3).    eGFR if non    Date Value Ref Range Status   07/21/2021 44.9 (A) >60 mL/min/1.73 m^2 Final     Comment:     Calculation used to obtain the estimated glomerular filtration  rate (eGFR) is the CKD-EPI equation.          The patient's chronic kidney disease stage 3 was monitored, evaluated, addressed and/or treated. This addendum to the medical record is made on 01/27/2023.

## 2023-02-07 DIAGNOSIS — Z00.00 ENCOUNTER FOR MEDICARE ANNUAL WELLNESS EXAM: ICD-10-CM

## 2023-02-09 DIAGNOSIS — Z00.00 ENCOUNTER FOR MEDICARE ANNUAL WELLNESS EXAM: ICD-10-CM

## 2023-03-01 ENCOUNTER — LAB VISIT (OUTPATIENT)
Dept: LAB | Facility: HOSPITAL | Age: 78
End: 2023-03-01
Attending: INTERNAL MEDICINE
Payer: MEDICARE

## 2023-03-01 DIAGNOSIS — N17.9 AKI (ACUTE KIDNEY INJURY): ICD-10-CM

## 2023-03-01 LAB
ALBUMIN SERPL BCP-MCNC: 3.7 G/DL (ref 3.5–5.2)
ALP SERPL-CCNC: 88 U/L (ref 55–135)
ALT SERPL W/O P-5'-P-CCNC: 13 U/L (ref 10–44)
ANION GAP SERPL CALC-SCNC: 7 MMOL/L (ref 8–16)
AST SERPL-CCNC: 15 U/L (ref 10–40)
BASOPHILS # BLD AUTO: 0.05 K/UL (ref 0–0.2)
BASOPHILS NFR BLD: 0.7 % (ref 0–1.9)
BILIRUB SERPL-MCNC: 1.6 MG/DL (ref 0.1–1)
BUN SERPL-MCNC: 24 MG/DL (ref 8–23)
CALCIUM SERPL-MCNC: 10 MG/DL (ref 8.7–10.5)
CHLORIDE SERPL-SCNC: 105 MMOL/L (ref 95–110)
CO2 SERPL-SCNC: 28 MMOL/L (ref 23–29)
CREAT SERPL-MCNC: 1.3 MG/DL (ref 0.5–1.4)
DIFFERENTIAL METHOD: ABNORMAL
EOSINOPHIL # BLD AUTO: 0.3 K/UL (ref 0–0.5)
EOSINOPHIL NFR BLD: 3.8 % (ref 0–8)
ERYTHROCYTE [DISTWIDTH] IN BLOOD BY AUTOMATED COUNT: 13.9 % (ref 11.5–14.5)
EST. GFR  (NO RACE VARIABLE): 56.6 ML/MIN/1.73 M^2
GLUCOSE SERPL-MCNC: 165 MG/DL (ref 70–110)
HCT VFR BLD AUTO: 43.5 % (ref 40–54)
HGB BLD-MCNC: 14 G/DL (ref 14–18)
IMM GRANULOCYTES # BLD AUTO: 0.05 K/UL (ref 0–0.04)
IMM GRANULOCYTES NFR BLD AUTO: 0.7 % (ref 0–0.5)
LYMPHOCYTES # BLD AUTO: 1.6 K/UL (ref 1–4.8)
LYMPHOCYTES NFR BLD: 23.4 % (ref 18–48)
MAGNESIUM SERPL-MCNC: 1.6 MG/DL (ref 1.6–2.6)
MCH RBC QN AUTO: 29.2 PG (ref 27–31)
MCHC RBC AUTO-ENTMCNC: 32.2 G/DL (ref 32–36)
MCV RBC AUTO: 91 FL (ref 82–98)
MONOCYTES # BLD AUTO: 0.6 K/UL (ref 0.3–1)
MONOCYTES NFR BLD: 8.7 % (ref 4–15)
NEUTROPHILS # BLD AUTO: 4.3 K/UL (ref 1.8–7.7)
NEUTROPHILS NFR BLD: 62.7 % (ref 38–73)
NRBC BLD-RTO: 0 /100 WBC
PLATELET # BLD AUTO: 170 K/UL (ref 150–450)
PMV BLD AUTO: 11.6 FL (ref 9.2–12.9)
POTASSIUM SERPL-SCNC: 4.1 MMOL/L (ref 3.5–5.1)
PROT SERPL-MCNC: 7 G/DL (ref 6–8.4)
RBC # BLD AUTO: 4.8 M/UL (ref 4.6–6.2)
SODIUM SERPL-SCNC: 140 MMOL/L (ref 136–145)
WBC # BLD AUTO: 6.89 K/UL (ref 3.9–12.7)

## 2023-03-01 PROCEDURE — 85025 COMPLETE CBC W/AUTO DIFF WBC: CPT | Mod: HCNC | Performed by: INTERNAL MEDICINE

## 2023-03-01 PROCEDURE — 36415 COLL VENOUS BLD VENIPUNCTURE: CPT | Mod: HCNC,PO | Performed by: INTERNAL MEDICINE

## 2023-03-01 PROCEDURE — 83735 ASSAY OF MAGNESIUM: CPT | Mod: HCNC | Performed by: INTERNAL MEDICINE

## 2023-03-01 PROCEDURE — 80053 COMPREHEN METABOLIC PANEL: CPT | Mod: HCNC | Performed by: INTERNAL MEDICINE

## 2023-03-08 ENCOUNTER — OFFICE VISIT (OUTPATIENT)
Dept: FAMILY MEDICINE | Facility: CLINIC | Age: 78
End: 2023-03-08
Payer: MEDICARE

## 2023-03-08 VITALS
HEART RATE: 81 BPM | OXYGEN SATURATION: 97 % | HEIGHT: 74 IN | WEIGHT: 288.38 LBS | SYSTOLIC BLOOD PRESSURE: 120 MMHG | BODY MASS INDEX: 37.01 KG/M2 | DIASTOLIC BLOOD PRESSURE: 70 MMHG

## 2023-03-08 DIAGNOSIS — I44.1 HEART BLOCK AV SECOND DEGREE: ICD-10-CM

## 2023-03-08 DIAGNOSIS — I48.0 PAROXYSMAL ATRIAL FIBRILLATION: ICD-10-CM

## 2023-03-08 DIAGNOSIS — E66.01 CLASS 3 SEVERE OBESITY DUE TO EXCESS CALORIES WITH SERIOUS COMORBIDITY AND BODY MASS INDEX (BMI) OF 40.0 TO 44.9 IN ADULT: ICD-10-CM

## 2023-03-08 DIAGNOSIS — Z87.891 HISTORY OF NICOTINE DEPENDENCE: ICD-10-CM

## 2023-03-08 DIAGNOSIS — E11.9 TYPE 2 DIABETES MELLITUS WITHOUT COMPLICATION, WITHOUT LONG-TERM CURRENT USE OF INSULIN: ICD-10-CM

## 2023-03-08 DIAGNOSIS — D64.9 ANEMIA, UNSPECIFIED TYPE: ICD-10-CM

## 2023-03-08 DIAGNOSIS — I10 ESSENTIAL HYPERTENSION: Primary | ICD-10-CM

## 2023-03-08 DIAGNOSIS — R60.0 LOCALIZED EDEMA: ICD-10-CM

## 2023-03-08 DIAGNOSIS — N17.9 AKI (ACUTE KIDNEY INJURY): ICD-10-CM

## 2023-03-08 DIAGNOSIS — E78.2 MIXED HYPERLIPIDEMIA: ICD-10-CM

## 2023-03-08 DIAGNOSIS — M10.9 GOUT, UNSPECIFIED CAUSE, UNSPECIFIED CHRONICITY, UNSPECIFIED SITE: ICD-10-CM

## 2023-03-08 DIAGNOSIS — N40.0 BENIGN PROSTATIC HYPERPLASIA WITHOUT LOWER URINARY TRACT SYMPTOMS: ICD-10-CM

## 2023-03-08 PROCEDURE — 99999 PR PBB SHADOW E&M-EST. PATIENT-LVL IV: CPT | Mod: PBBFAC,HCNC,, | Performed by: INTERNAL MEDICINE

## 2023-03-08 PROCEDURE — 1159F PR MEDICATION LIST DOCUMENTED IN MEDICAL RECORD: ICD-10-PCS | Mod: HCNC,CPTII,S$GLB, | Performed by: INTERNAL MEDICINE

## 2023-03-08 PROCEDURE — 1126F PR PAIN SEVERITY QUANTIFIED, NO PAIN PRESENT: ICD-10-PCS | Mod: HCNC,CPTII,S$GLB, | Performed by: INTERNAL MEDICINE

## 2023-03-08 PROCEDURE — 1160F PR REVIEW ALL MEDS BY PRESCRIBER/CLIN PHARMACIST DOCUMENTED: ICD-10-PCS | Mod: HCNC,CPTII,S$GLB, | Performed by: INTERNAL MEDICINE

## 2023-03-08 PROCEDURE — 3288F PR FALLS RISK ASSESSMENT DOCUMENTED: ICD-10-PCS | Mod: HCNC,CPTII,S$GLB, | Performed by: INTERNAL MEDICINE

## 2023-03-08 PROCEDURE — 3078F PR MOST RECENT DIASTOLIC BLOOD PRESSURE < 80 MM HG: ICD-10-PCS | Mod: HCNC,CPTII,S$GLB, | Performed by: INTERNAL MEDICINE

## 2023-03-08 PROCEDURE — 3288F FALL RISK ASSESSMENT DOCD: CPT | Mod: HCNC,CPTII,S$GLB, | Performed by: INTERNAL MEDICINE

## 2023-03-08 PROCEDURE — 1159F MED LIST DOCD IN RCRD: CPT | Mod: HCNC,CPTII,S$GLB, | Performed by: INTERNAL MEDICINE

## 2023-03-08 PROCEDURE — 1101F PT FALLS ASSESS-DOCD LE1/YR: CPT | Mod: HCNC,CPTII,S$GLB, | Performed by: INTERNAL MEDICINE

## 2023-03-08 PROCEDURE — 1126F AMNT PAIN NOTED NONE PRSNT: CPT | Mod: HCNC,CPTII,S$GLB, | Performed by: INTERNAL MEDICINE

## 2023-03-08 PROCEDURE — 1101F PR PT FALLS ASSESS DOC 0-1 FALLS W/OUT INJ PAST YR: ICD-10-PCS | Mod: HCNC,CPTII,S$GLB, | Performed by: INTERNAL MEDICINE

## 2023-03-08 PROCEDURE — 1160F RVW MEDS BY RX/DR IN RCRD: CPT | Mod: HCNC,CPTII,S$GLB, | Performed by: INTERNAL MEDICINE

## 2023-03-08 PROCEDURE — 99999 PR PBB SHADOW E&M-EST. PATIENT-LVL IV: ICD-10-PCS | Mod: PBBFAC,HCNC,, | Performed by: INTERNAL MEDICINE

## 2023-03-08 PROCEDURE — 99214 OFFICE O/P EST MOD 30 MIN: CPT | Mod: HCNC,S$GLB,, | Performed by: INTERNAL MEDICINE

## 2023-03-08 PROCEDURE — 99214 PR OFFICE/OUTPT VISIT, EST, LEVL IV, 30-39 MIN: ICD-10-PCS | Mod: HCNC,S$GLB,, | Performed by: INTERNAL MEDICINE

## 2023-03-08 PROCEDURE — 3074F SYST BP LT 130 MM HG: CPT | Mod: HCNC,CPTII,S$GLB, | Performed by: INTERNAL MEDICINE

## 2023-03-08 PROCEDURE — 3074F PR MOST RECENT SYSTOLIC BLOOD PRESSURE < 130 MM HG: ICD-10-PCS | Mod: HCNC,CPTII,S$GLB, | Performed by: INTERNAL MEDICINE

## 2023-03-08 PROCEDURE — 3078F DIAST BP <80 MM HG: CPT | Mod: HCNC,CPTII,S$GLB, | Performed by: INTERNAL MEDICINE

## 2023-03-08 NOTE — PROGRESS NOTES
Patient ID: Nate Bah     Chief Complaint:   Chief Complaint   Patient presents with    Follow-up     3 weeks f/u        HPI:  Short-term follow-up after he was hospitalized for Salmonella bacteremia with a lot of diarrhea.  Symptoms have improved and he is relatively back to baseline.  He has lost some weight and continues to try.  Labs are looking good.  His kidney function has returned to normal but I want him to keep hydrated especially because he takes Lasix.  His anemia has resolved.  His magnesium level is still on the low end due to the Lasix so I want him to continue his magnesium supplement.  He does have upcoming A1c and lipid panel which I will review later.  He does complain of some bilateral lower extremity edema despite Lasix and due to his recent acute kidney injury I do not want him to increase the Lasix right now.  I do want him to wear his compression socks and that should take care of the swelling.  He does consent to a low-dose CT of his lungs due to his smoking history.  He recently got a COVID booster so I think he is okay right now.  Believes his wife is setting up a diabetic eye exam for him.  Blood pressure was little high on the 1st check but improved greatly on the recheck.    Review of Systems   Constitutional: Negative.    HENT: Negative.     Eyes: Negative.    Respiratory: Negative.     Cardiovascular: Negative.    Gastrointestinal: Negative.    Endocrine: Negative.    Genitourinary: Negative.    Musculoskeletal: Negative.    Skin: Negative.    Allergic/Immunologic: Negative.    Neurological: Negative.    Hematological: Negative.    Psychiatric/Behavioral: Negative.          Objective:      Physical Exam   Physical Exam  Vitals and nursing note reviewed.   Constitutional:       Appearance: Normal appearance. He is well-developed. He is obese.   HENT:      Head: Normocephalic and atraumatic.      Nose: Nose normal.      Mouth/Throat:      Mouth: Mucous membranes are moist.   Eyes:  "     Extraocular Movements: Extraocular movements intact.      Conjunctiva/sclera: Conjunctivae normal.      Pupils: Pupils are equal, round, and reactive to light.   Cardiovascular:      Rate and Rhythm: Normal rate. Rhythm irregular.      Pulses: Normal pulses.      Heart sounds: Normal heart sounds.   Pulmonary:      Effort: Pulmonary effort is normal.      Breath sounds: Normal breath sounds.   Abdominal:      General: Bowel sounds are normal.      Palpations: Abdomen is soft.   Musculoskeletal:         General: Normal range of motion.      Cervical back: Normal range of motion and neck supple.   Skin:     General: Skin is warm and dry.      Capillary Refill: Capillary refill takes less than 2 seconds.   Neurological:      General: No focal deficit present.      Mental Status: He is alert and oriented to person, place, and time.   Psychiatric:         Mood and Affect: Mood normal.         Behavior: Behavior normal.         Thought Content: Thought content normal.         Judgment: Judgment normal.          Vitals:   Vitals:    03/08/23 1046 03/08/23 1105   BP: (!) 148/68 120/70   BP Location: Right arm    Patient Position: Sitting    BP Method: Large (Manual)    Pulse: 81    SpO2: 97%    Weight: 130.8 kg (288 lb 5.8 oz)    Height: 6' 2" (1.88 m)           Current Outpatient Medications:     allopurinoL (ZYLOPRIM) 300 MG tablet, Take 1 tablet (300 mg total) by mouth once daily., Disp: 90 tablet, Rfl: 3    aspirin (ECOTRIN) 81 MG EC tablet, Take 81 mg by mouth once daily., Disp: , Rfl:     atorvastatin (LIPITOR) 80 MG tablet, Take 1 tablet (80 mg total) by mouth Every 3 (three) days., Disp: , Rfl:     clopidogreL (PLAVIX) 75 mg tablet, TAKE 1 TABLET EVERY DAY (Patient taking differently: Take 75 mg by mouth once daily.), Disp: 90 tablet, Rfl: 3    docusate sodium (COLACE) 100 MG capsule, Take 100 mg by mouth 2 (two) times daily., Disp: , Rfl:     furosemide (LASIX) 40 MG tablet, TAKE 1 TABLET EVERY DAY (Patient " taking differently: Take 40 mg by mouth once daily.), Disp: 90 tablet, Rfl: 3    glipiZIDE (GLUCOTROL) 5 MG tablet, TAKE 1 TABLET EVERY DAY (Patient taking differently: Take 5 mg by mouth once daily. ADMINISTER 30 MINUTES BEFORE MEAL(S).), Disp: 90 tablet, Rfl: 3    losartan (COZAAR) 100 MG tablet, TAKE 1 TABLET EVERY DAY (Patient taking differently: Take 100 mg by mouth once daily.), Disp: 90 tablet, Rfl: 3    MAGNESIUM ORAL, Take 400 mg by mouth every evening. , Disp: , Rfl:     nitroGLYCERIN (NITROSTAT) 0.4 MG SL tablet, PLACE 1 TABLET (0.4 MG TOTAL) UNDER THE TONGUE EVERY 5 MINUTES AS NEEDED FOR CHEST PAIN. MAX 3 DOSES. CALL EMERGENCY SERVICES. (Patient taking differently: Place 0.4 mg under the tongue every 5 (five) minutes as needed for Chest pain (x 3 doses).), Disp: 25 tablet, Rfl: 3    pantoprazole (PROTONIX) 40 MG tablet, Take 1 tablet (40 mg total) by mouth once daily., Disp: 90 tablet, Rfl: 3    ascorbic acid (MURALI-C ORAL), Take 250 mg by mouth once daily., Disp: , Rfl:     cholestyramine-aspartame (QUESTRAN LIGHT) 4 gram PwPk, Take 1 packet (4 g total) by mouth 2 (two) times daily. for 10 days, Disp: 20 packet, Rfl: 0    ergocalciferol, vitamin D2, (VITAMIN D ORAL), Take 400 mg by mouth once daily., Disp: , Rfl:     isosorbide mononitrate (IMDUR) 60 MG 24 hr tablet, Take 1 tablet (60 mg total) by mouth every evening. (Patient not taking: Reported on 3/8/2023), Disp: 30 tablet, Rfl: 11    metFORMIN (GLUCOPHAGE-XR) 500 MG ER 24hr tablet, Take 500 mg by mouth 2 (two) times daily with meals., Disp: , Rfl:    Assessment:       Patient Active Problem List    Diagnosis Date Noted    History of nicotine dependence 03/08/2023    Bacteremia 01/18/2023    Salmonella gastroenteritis 01/17/2023    Intravascular volume depletion 01/17/2023    Anemia 07/28/2021    Heart block AV second degree 05/02/2021    ACP (advance care planning) 05/02/2021    Peripheral vascular disease, unspecified 01/28/2021    Perirectal  abscess 03/25/2020    Class 3 severe obesity due to excess calories with serious comorbidity and body mass index (BMI) of 40.0 to 44.9 in adult 01/22/2020    Wheezing 01/22/2020    Localized edema 01/22/2020    Hammer toes of both feet 08/12/2019    Diabetic polyneuropathy associated with type 2 diabetes mellitus 08/12/2019    Prostate cancer screening 07/19/2019    Mixed hyperlipidemia     BPH (benign prostatic hyperplasia)     Primary osteoarthritis of left knee 02/07/2018    Type 2 diabetes mellitus without complication 11/16/2017    JACKSON (acute kidney injury) 12/04/2016    Diffuse esophageal spasm 12/04/2016    Gout 09/29/2014    Paroxysmal atrial fibrillation 05/29/2014    Coronary artery disease + Ectasia, h/o CABG & stentsCABG x 1, LIMA to LAD 8/2003 Dicorte, Stent RCA 2002, stent LCx 7/09, stent ostial LAD, LCx 11/11, 7/18 stent prox LAD 4.0 x 28 Synergy 12/20/2010    Essential hypertension 12/20/2010    Gastroesophageal reflux disease without esophagitis 08/20/2010          Plan:       Nate ARNETT Tiburciozoltan  was seen today for follow-up and may need lab work.    Diagnoses and all orders for this visit:    Nate was seen today for follow-up.    Diagnoses and all orders for this visit:    Essential hypertension  Controlled with med     History of nicotine dependence  -     CT Chest Lung Screening Low Dose; Future    Mixed hyperlipidemia  Labs ordered.     Paroxysmal atrial fibrillation  Heart rate Controlled   Continue Plavix   To see Cardiology in a few months     Heart block AV second degree  S/p PPM - Controlled     Benign prostatic hyperplasia without lower urinary tract symptoms  Monitor symptoms     Anemia, unspecified type  Resolved     Class 3 severe obesity due to excess calories with serious comorbidity and body mass index (BMI) of 40.0 to 44.9 in adult  Losing weight     Gout, unspecified cause, unspecified chronicity, unspecified site  Controlled     Localized edema  Persists but mild - continue lasix and  wear compression socks     JACKSON (acute kidney injury)  Resolved     Type 2 diabetes mellitus without complication, without long-term current use of insulin  Check labs

## 2023-03-29 ENCOUNTER — HOSPITAL ENCOUNTER (OUTPATIENT)
Dept: RADIOLOGY | Facility: HOSPITAL | Age: 78
Discharge: HOME | End: 2023-03-29
Attending: INTERNAL MEDICINE
Payer: MEDICARE

## 2023-03-29 DIAGNOSIS — Z87.891 HISTORY OF NICOTINE DEPENDENCE: ICD-10-CM

## 2023-03-29 PROCEDURE — 71271 CT CHEST LUNG SCREENING LOW DOSE: ICD-10-PCS | Mod: 26,HCNC,, | Performed by: RADIOLOGY

## 2023-03-29 PROCEDURE — 71271 CT THORAX LUNG CANCER SCR C-: CPT | Mod: TC,HCNC,PO

## 2023-03-29 PROCEDURE — 71271 CT THORAX LUNG CANCER SCR C-: CPT | Mod: 26,HCNC,, | Performed by: RADIOLOGY

## 2023-04-12 ENCOUNTER — PES CALL (OUTPATIENT)
Dept: ADMINISTRATIVE | Facility: CLINIC | Age: 78
End: 2023-04-12
Payer: MEDICARE

## 2023-09-07 ENCOUNTER — PATIENT MESSAGE (OUTPATIENT)
Dept: FAMILY MEDICINE | Facility: CLINIC | Age: 78
End: 2023-09-07
Payer: MEDICARE

## 2023-09-14 ENCOUNTER — LAB VISIT (OUTPATIENT)
Dept: LAB | Facility: HOSPITAL | Age: 78
End: 2023-09-14
Attending: INTERNAL MEDICINE
Payer: MEDICARE

## 2023-09-14 DIAGNOSIS — E11.9 TYPE 2 DIABETES MELLITUS WITHOUT COMPLICATION, UNSPECIFIED WHETHER LONG TERM INSULIN USE: ICD-10-CM

## 2023-09-14 DIAGNOSIS — Z12.5 PROSTATE CANCER SCREENING: ICD-10-CM

## 2023-09-14 LAB
ALBUMIN SERPL BCP-MCNC: 3.9 G/DL (ref 3.5–5.2)
ALP SERPL-CCNC: 101 U/L (ref 55–135)
ALT SERPL W/O P-5'-P-CCNC: 20 U/L (ref 10–44)
ANION GAP SERPL CALC-SCNC: 12 MMOL/L (ref 8–16)
AST SERPL-CCNC: 18 U/L (ref 10–40)
BASOPHILS # BLD AUTO: 0.06 K/UL (ref 0–0.2)
BASOPHILS NFR BLD: 0.8 % (ref 0–1.9)
BILIRUB SERPL-MCNC: 2 MG/DL (ref 0.1–1)
BUN SERPL-MCNC: 23 MG/DL (ref 8–23)
CALCIUM SERPL-MCNC: 10.1 MG/DL (ref 8.7–10.5)
CHLORIDE SERPL-SCNC: 101 MMOL/L (ref 95–110)
CHOLEST SERPL-MCNC: 144 MG/DL (ref 120–199)
CHOLEST/HDLC SERPL: 4.6 {RATIO} (ref 2–5)
CO2 SERPL-SCNC: 27 MMOL/L (ref 23–29)
COMPLEXED PSA SERPL-MCNC: 4.8 NG/ML (ref 0–4)
CREAT SERPL-MCNC: 1.6 MG/DL (ref 0.5–1.4)
DIFFERENTIAL METHOD: ABNORMAL
EOSINOPHIL # BLD AUTO: 0.2 K/UL (ref 0–0.5)
EOSINOPHIL NFR BLD: 3.1 % (ref 0–8)
ERYTHROCYTE [DISTWIDTH] IN BLOOD BY AUTOMATED COUNT: 14.3 % (ref 11.5–14.5)
EST. GFR  (NO RACE VARIABLE): 43.8 ML/MIN/1.73 M^2
ESTIMATED AVG GLUCOSE: 157 MG/DL (ref 68–131)
GLUCOSE SERPL-MCNC: 131 MG/DL (ref 70–110)
HBA1C MFR BLD: 7.1 % (ref 4–5.6)
HCT VFR BLD AUTO: 48.9 % (ref 40–54)
HDLC SERPL-MCNC: 31 MG/DL (ref 40–75)
HDLC SERPL: 21.5 % (ref 20–50)
HGB BLD-MCNC: 16.1 G/DL (ref 14–18)
IMM GRANULOCYTES # BLD AUTO: 0.05 K/UL (ref 0–0.04)
IMM GRANULOCYTES NFR BLD AUTO: 0.7 % (ref 0–0.5)
LDLC SERPL CALC-MCNC: 81.6 MG/DL (ref 63–159)
LYMPHOCYTES # BLD AUTO: 1.7 K/UL (ref 1–4.8)
LYMPHOCYTES NFR BLD: 22.1 % (ref 18–48)
MCH RBC QN AUTO: 29.4 PG (ref 27–31)
MCHC RBC AUTO-ENTMCNC: 32.9 G/DL (ref 32–36)
MCV RBC AUTO: 89 FL (ref 82–98)
MONOCYTES # BLD AUTO: 0.6 K/UL (ref 0.3–1)
MONOCYTES NFR BLD: 7.5 % (ref 4–15)
NEUTROPHILS # BLD AUTO: 5 K/UL (ref 1.8–7.7)
NEUTROPHILS NFR BLD: 65.8 % (ref 38–73)
NONHDLC SERPL-MCNC: 113 MG/DL
NRBC BLD-RTO: 0 /100 WBC
PLATELET # BLD AUTO: 197 K/UL (ref 150–450)
PMV BLD AUTO: 11.5 FL (ref 9.2–12.9)
POTASSIUM SERPL-SCNC: 4.7 MMOL/L (ref 3.5–5.1)
PROT SERPL-MCNC: 7.4 G/DL (ref 6–8.4)
RBC # BLD AUTO: 5.48 M/UL (ref 4.6–6.2)
SODIUM SERPL-SCNC: 140 MMOL/L (ref 136–145)
TRIGL SERPL-MCNC: 157 MG/DL (ref 30–150)
WBC # BLD AUTO: 7.65 K/UL (ref 3.9–12.7)

## 2023-09-14 PROCEDURE — 36415 COLL VENOUS BLD VENIPUNCTURE: CPT | Mod: HCNC,PO | Performed by: INTERNAL MEDICINE

## 2023-09-14 PROCEDURE — 80061 LIPID PANEL: CPT | Mod: HCNC | Performed by: INTERNAL MEDICINE

## 2023-09-14 PROCEDURE — 85025 COMPLETE CBC W/AUTO DIFF WBC: CPT | Mod: HCNC | Performed by: INTERNAL MEDICINE

## 2023-09-14 PROCEDURE — 83036 HEMOGLOBIN GLYCOSYLATED A1C: CPT | Mod: HCNC | Performed by: INTERNAL MEDICINE

## 2023-09-14 PROCEDURE — 84153 ASSAY OF PSA TOTAL: CPT | Mod: HCNC | Performed by: INTERNAL MEDICINE

## 2023-09-14 PROCEDURE — 80053 COMPREHEN METABOLIC PANEL: CPT | Mod: HCNC | Performed by: INTERNAL MEDICINE

## 2023-09-21 ENCOUNTER — OFFICE VISIT (OUTPATIENT)
Dept: FAMILY MEDICINE | Facility: CLINIC | Age: 78
End: 2023-09-21
Payer: MEDICARE

## 2023-09-21 VITALS
DIASTOLIC BLOOD PRESSURE: 70 MMHG | RESPIRATION RATE: 18 BRPM | OXYGEN SATURATION: 97 % | HEART RATE: 89 BPM | HEIGHT: 74 IN | SYSTOLIC BLOOD PRESSURE: 138 MMHG | WEIGHT: 290.81 LBS | BODY MASS INDEX: 37.32 KG/M2

## 2023-09-21 DIAGNOSIS — Z00.00 WELLNESS EXAMINATION: Primary | ICD-10-CM

## 2023-09-21 DIAGNOSIS — E78.2 MIXED HYPERLIPIDEMIA: ICD-10-CM

## 2023-09-21 DIAGNOSIS — I48.0 PAROXYSMAL ATRIAL FIBRILLATION: ICD-10-CM

## 2023-09-21 DIAGNOSIS — R60.0 BILATERAL LEG EDEMA: ICD-10-CM

## 2023-09-21 DIAGNOSIS — E66.01 CLASS 2 SEVERE OBESITY DUE TO EXCESS CALORIES WITH SERIOUS COMORBIDITY AND BODY MASS INDEX (BMI) OF 37.0 TO 37.9 IN ADULT: ICD-10-CM

## 2023-09-21 DIAGNOSIS — M10.479 OTHER SECONDARY ACUTE GOUT OF FOOT, UNSPECIFIED LATERALITY: ICD-10-CM

## 2023-09-21 DIAGNOSIS — R97.20 ELEVATED PSA, LESS THAN 10 NG/ML: ICD-10-CM

## 2023-09-21 DIAGNOSIS — E11.9 TYPE 2 DIABETES MELLITUS WITHOUT COMPLICATION, WITHOUT LONG-TERM CURRENT USE OF INSULIN: ICD-10-CM

## 2023-09-21 DIAGNOSIS — N41.0 ACUTE PROSTATITIS: ICD-10-CM

## 2023-09-21 DIAGNOSIS — I10 ESSENTIAL HYPERTENSION: ICD-10-CM

## 2023-09-21 PROCEDURE — 3075F SYST BP GE 130 - 139MM HG: CPT | Mod: HCNC,CPTII,S$GLB, | Performed by: INTERNAL MEDICINE

## 2023-09-21 PROCEDURE — 1159F PR MEDICATION LIST DOCUMENTED IN MEDICAL RECORD: ICD-10-PCS | Mod: HCNC,CPTII,S$GLB, | Performed by: INTERNAL MEDICINE

## 2023-09-21 PROCEDURE — 1126F PR PAIN SEVERITY QUANTIFIED, NO PAIN PRESENT: ICD-10-PCS | Mod: HCNC,CPTII,S$GLB, | Performed by: INTERNAL MEDICINE

## 2023-09-21 PROCEDURE — 3288F PR FALLS RISK ASSESSMENT DOCUMENTED: ICD-10-PCS | Mod: HCNC,CPTII,S$GLB, | Performed by: INTERNAL MEDICINE

## 2023-09-21 PROCEDURE — 1160F PR REVIEW ALL MEDS BY PRESCRIBER/CLIN PHARMACIST DOCUMENTED: ICD-10-PCS | Mod: HCNC,CPTII,S$GLB, | Performed by: INTERNAL MEDICINE

## 2023-09-21 PROCEDURE — 99999 PR PBB SHADOW E&M-EST. PATIENT-LVL IV: CPT | Mod: PBBFAC,HCNC,, | Performed by: INTERNAL MEDICINE

## 2023-09-21 PROCEDURE — 1159F MED LIST DOCD IN RCRD: CPT | Mod: HCNC,CPTII,S$GLB, | Performed by: INTERNAL MEDICINE

## 2023-09-21 PROCEDURE — 3078F PR MOST RECENT DIASTOLIC BLOOD PRESSURE < 80 MM HG: ICD-10-PCS | Mod: HCNC,CPTII,S$GLB, | Performed by: INTERNAL MEDICINE

## 2023-09-21 PROCEDURE — 90694 FLU VACCINE - QUADRIVALENT - ADJUVANTED: ICD-10-PCS | Mod: HCNC,S$GLB,, | Performed by: INTERNAL MEDICINE

## 2023-09-21 PROCEDURE — 1126F AMNT PAIN NOTED NONE PRSNT: CPT | Mod: HCNC,CPTII,S$GLB, | Performed by: INTERNAL MEDICINE

## 2023-09-21 PROCEDURE — 99397 PER PM REEVAL EST PAT 65+ YR: CPT | Mod: HCNC,S$GLB,, | Performed by: INTERNAL MEDICINE

## 2023-09-21 PROCEDURE — 1101F PR PT FALLS ASSESS DOC 0-1 FALLS W/OUT INJ PAST YR: ICD-10-PCS | Mod: HCNC,CPTII,S$GLB, | Performed by: INTERNAL MEDICINE

## 2023-09-21 PROCEDURE — G0008 FLU VACCINE - QUADRIVALENT - ADJUVANTED: ICD-10-PCS | Mod: HCNC,S$GLB,, | Performed by: INTERNAL MEDICINE

## 2023-09-21 PROCEDURE — 3075F PR MOST RECENT SYSTOLIC BLOOD PRESS GE 130-139MM HG: ICD-10-PCS | Mod: HCNC,CPTII,S$GLB, | Performed by: INTERNAL MEDICINE

## 2023-09-21 PROCEDURE — G0008 ADMIN INFLUENZA VIRUS VAC: HCPCS | Mod: HCNC,S$GLB,, | Performed by: INTERNAL MEDICINE

## 2023-09-21 PROCEDURE — 99397 PR PREVENTIVE VISIT,EST,65 & OVER: ICD-10-PCS | Mod: HCNC,S$GLB,, | Performed by: INTERNAL MEDICINE

## 2023-09-21 PROCEDURE — 90694 VACC AIIV4 NO PRSRV 0.5ML IM: CPT | Mod: HCNC,S$GLB,, | Performed by: INTERNAL MEDICINE

## 2023-09-21 PROCEDURE — 1101F PT FALLS ASSESS-DOCD LE1/YR: CPT | Mod: HCNC,CPTII,S$GLB, | Performed by: INTERNAL MEDICINE

## 2023-09-21 PROCEDURE — 3078F DIAST BP <80 MM HG: CPT | Mod: HCNC,CPTII,S$GLB, | Performed by: INTERNAL MEDICINE

## 2023-09-21 PROCEDURE — 3288F FALL RISK ASSESSMENT DOCD: CPT | Mod: HCNC,CPTII,S$GLB, | Performed by: INTERNAL MEDICINE

## 2023-09-21 PROCEDURE — 99999 PR PBB SHADOW E&M-EST. PATIENT-LVL IV: ICD-10-PCS | Mod: PBBFAC,HCNC,, | Performed by: INTERNAL MEDICINE

## 2023-09-21 PROCEDURE — 1160F RVW MEDS BY RX/DR IN RCRD: CPT | Mod: HCNC,CPTII,S$GLB, | Performed by: INTERNAL MEDICINE

## 2023-09-21 RX ORDER — CIPROFLOXACIN 500 MG/1
500 TABLET ORAL 2 TIMES DAILY
Qty: 28 TABLET | Refills: 0 | Status: SHIPPED | OUTPATIENT
Start: 2023-09-21 | End: 2023-10-05

## 2023-09-21 NOTE — PROGRESS NOTES
"[facility]  Primary Care   [address] [facility] [address]       Patient ID: Nate Bah     Chief Complaint:   Chief Complaint   Patient presents with    Annual Exam        HPI: Annual exam and doing better. Salmonella diarrhea Resolved and he has gained weight back. Labs reviewed:   LDL slightly high- will Monitor on Atorvastatin.   Diabetes Controlled with Glipizide and A1c 7.1. He declines Metformin. Needs eye exam and will see the same one his wife sees.   Labs look dehydrated due to lasix and not drinking enough water.   PSA elevated baseline- Cipro 500 mg twice daily x 14 days and then Diagnostic PSA  Flu shot today   Needs Uric acid Rechecked   Getting some skin cancers on arms removed  Will see Dr. Winter and I want him to evaluate ascending aortic aneurysm     Review of Systems       Negative     Objective:      Physical Exam   Physical Exam       Obese     Vitals:   Vitals:    09/21/23 1034   BP: 138/70   Pulse: 89   Resp: 18   SpO2: 97%   Weight: 131.9 kg (290 lb 12.6 oz)   Height: 6' 2" (1.88 m)        Assessment:           Plan:       Nate Bah  was seen today for follow-up and may need lab work.    Diagnoses and all orders for this visit:    Nate was seen today for annual exam.    Diagnoses and all orders for this visit:    Wellness examination    Acute prostatitis  -     ciprofloxacin HCl (CIPRO) 500 MG tablet; Take 1 tablet (500 mg total) by mouth 2 (two) times daily. for 14 days    Elevated PSA, less than 10 ng/ml  -     PROSTATE SPECIFIC ANTIGEN, DIAGNOSTIC; Future  Recheck in 4 weeks     Other secondary acute gout of foot, unspecified laterality  -     Uric Acid; Future  Check labs      Mixed hyperlipidemia  LD slightly high for Diabetes     Paroxysmal atrial fibrillation  Controlled with med     Essential hypertension  Controlled with med     Class 2 severe obesity due to excess calories with serious comorbidity and body mass index (BMI) of 37.0 to 37.9 in adult  Monitor "     Bilateral leg edema  Monitor with compression socks     Type 2 diabetes mellitus without complication, without long-term current use of insulin  Controlled with mariano Parekh MD

## 2023-10-20 ENCOUNTER — LAB VISIT (OUTPATIENT)
Dept: LAB | Facility: HOSPITAL | Age: 78
End: 2023-10-20
Attending: INTERNAL MEDICINE
Payer: MEDICARE

## 2023-10-20 DIAGNOSIS — R97.20 ELEVATED PSA, LESS THAN 10 NG/ML: ICD-10-CM

## 2023-10-20 DIAGNOSIS — M10.479 OTHER SECONDARY ACUTE GOUT OF FOOT, UNSPECIFIED LATERALITY: ICD-10-CM

## 2023-10-20 PROCEDURE — 36415 COLL VENOUS BLD VENIPUNCTURE: CPT | Mod: HCNC,PO | Performed by: INTERNAL MEDICINE

## 2023-10-20 PROCEDURE — 84153 ASSAY OF PSA TOTAL: CPT | Mod: HCNC | Performed by: INTERNAL MEDICINE

## 2023-10-20 PROCEDURE — 84550 ASSAY OF BLOOD/URIC ACID: CPT | Mod: HCNC | Performed by: INTERNAL MEDICINE

## 2023-10-21 LAB
COMPLEXED PSA SERPL-MCNC: 4.3 NG/ML (ref 0–4)
URATE SERPL-MCNC: 7.4 MG/DL (ref 3.4–7)

## 2023-10-26 ENCOUNTER — PATIENT MESSAGE (OUTPATIENT)
Dept: FAMILY MEDICINE | Facility: CLINIC | Age: 78
End: 2023-10-26
Payer: MEDICARE

## 2023-10-26 DIAGNOSIS — R97.20 ABNORMAL PSA: Primary | ICD-10-CM

## 2023-10-30 ENCOUNTER — PATIENT MESSAGE (OUTPATIENT)
Dept: FAMILY MEDICINE | Facility: CLINIC | Age: 78
End: 2023-10-30
Payer: MEDICARE

## 2023-11-08 ENCOUNTER — OFFICE VISIT (OUTPATIENT)
Dept: UROLOGY | Facility: CLINIC | Age: 78
End: 2023-11-08
Payer: MEDICARE

## 2023-11-08 VITALS — BODY MASS INDEX: 36.84 KG/M2 | WEIGHT: 287.06 LBS | HEIGHT: 74 IN

## 2023-11-08 DIAGNOSIS — R39.12 BENIGN PROSTATIC HYPERPLASIA WITH WEAK URINARY STREAM: Primary | ICD-10-CM

## 2023-11-08 DIAGNOSIS — N40.1 BENIGN PROSTATIC HYPERPLASIA WITH WEAK URINARY STREAM: Primary | ICD-10-CM

## 2023-11-08 DIAGNOSIS — R97.20 ABNORMAL PSA: ICD-10-CM

## 2023-11-08 LAB
BILIRUBIN, UA POC OHS: NEGATIVE
BLOOD, UA POC OHS: NEGATIVE
CLARITY, UA POC OHS: CLEAR
COLOR, UA POC OHS: YELLOW
GLUCOSE, UA POC OHS: NEGATIVE
KETONES, UA POC OHS: NEGATIVE
LEUKOCYTES, UA POC OHS: NEGATIVE
NITRITE, UA POC OHS: NEGATIVE
PH, UA POC OHS: 6.5
POC RESIDUAL URINE VOLUME: 33 ML (ref 0–100)
PROTEIN, UA POC OHS: NEGATIVE
SPECIFIC GRAVITY, UA POC OHS: 1.01
UROBILINOGEN, UA POC OHS: 0.2

## 2023-11-08 PROCEDURE — 99999 PR PBB SHADOW E&M-EST. PATIENT-LVL III: ICD-10-PCS | Mod: PBBFAC,HCNC,,

## 2023-11-08 PROCEDURE — 81003 URINALYSIS AUTO W/O SCOPE: CPT | Mod: QW,HCNC,S$GLB,

## 2023-11-08 PROCEDURE — 1159F PR MEDICATION LIST DOCUMENTED IN MEDICAL RECORD: ICD-10-PCS | Mod: HCNC,CPTII,S$GLB,

## 2023-11-08 PROCEDURE — 51798 POCT BLADDER SCAN: ICD-10-PCS | Mod: HCNC,S$GLB,,

## 2023-11-08 PROCEDURE — 3288F PR FALLS RISK ASSESSMENT DOCUMENTED: ICD-10-PCS | Mod: HCNC,CPTII,S$GLB,

## 2023-11-08 PROCEDURE — 3288F FALL RISK ASSESSMENT DOCD: CPT | Mod: HCNC,CPTII,S$GLB,

## 2023-11-08 PROCEDURE — 99214 PR OFFICE/OUTPT VISIT, EST, LEVL IV, 30-39 MIN: ICD-10-PCS | Mod: HCNC,25,S$GLB,

## 2023-11-08 PROCEDURE — 51798 US URINE CAPACITY MEASURE: CPT | Mod: HCNC,S$GLB,,

## 2023-11-08 PROCEDURE — 99999 PR PBB SHADOW E&M-EST. PATIENT-LVL III: CPT | Mod: PBBFAC,HCNC,,

## 2023-11-08 PROCEDURE — 81003 POCT URINALYSIS(INSTRUMENT): ICD-10-PCS | Mod: QW,HCNC,S$GLB,

## 2023-11-08 PROCEDURE — 1159F MED LIST DOCD IN RCRD: CPT | Mod: HCNC,CPTII,S$GLB,

## 2023-11-08 PROCEDURE — 1126F AMNT PAIN NOTED NONE PRSNT: CPT | Mod: HCNC,CPTII,S$GLB,

## 2023-11-08 PROCEDURE — 1126F PR PAIN SEVERITY QUANTIFIED, NO PAIN PRESENT: ICD-10-PCS | Mod: HCNC,CPTII,S$GLB,

## 2023-11-08 PROCEDURE — 1101F PR PT FALLS ASSESS DOC 0-1 FALLS W/OUT INJ PAST YR: ICD-10-PCS | Mod: HCNC,CPTII,S$GLB,

## 2023-11-08 PROCEDURE — 1101F PT FALLS ASSESS-DOCD LE1/YR: CPT | Mod: HCNC,CPTII,S$GLB,

## 2023-11-08 PROCEDURE — 1160F RVW MEDS BY RX/DR IN RCRD: CPT | Mod: HCNC,CPTII,S$GLB,

## 2023-11-08 PROCEDURE — 1160F PR REVIEW ALL MEDS BY PRESCRIBER/CLIN PHARMACIST DOCUMENTED: ICD-10-PCS | Mod: HCNC,CPTII,S$GLB,

## 2023-11-08 PROCEDURE — 99214 OFFICE O/P EST MOD 30 MIN: CPT | Mod: HCNC,25,S$GLB,

## 2023-11-08 NOTE — PROGRESS NOTES
[facility] Note  Staff: KEITH Biggs    PCP: MD Iglesia    Chief Complaint: Elevated PSA    Subjective:        HPI: Nate Bah is a 78 y.o. male NEW PATIENT presents today for evaluation of an elevated PSA.  He is accompanied by his daughter.  His most recent PSA is from 10/20/2023 at 4.3.  He denies a family history of prostate cancer.  We discussed PSA and its limitations.  We discussed further evaluation with prostate needle biopsy versus MRI of prostate.  We discussed [facility] PSA guidelines based on patient's age.  With the patient in his 70s decade PSA is able to be less than or equal to 6.5 before further evaluation is warranted.  Does not wish to pursue further evaluation at this time.  He denies dysuria, hematuria, fever, flank pain, abdominal pain, incontinence, and difficulty urinating.  Currently not taking any bladder or prostate medications.  He denies a history of kidney stones.    Questions asked the pt during ov today:  Urgency:  No, urge incontinence?  No  Dysuria: No  Gross Hematuria:No  Straining:No, Hesistancy:No, Intermittency:No}, Weak stream:No    Last PSA Screening:   Lab Results   Component Value Date    PSA 4.8 (H) 09/14/2023    PSA 2.5 03/03/2020    PSA 2.6 08/17/2017    PSADIAG 4.3 (H) 10/20/2023       History of Kidney Stones?:  No    Constipation issues?:  No    PVR by bladder scan performed by MA today:  33 mL    REVIEW OF SYSTEMS:  Review of Systems   Constitutional: Negative.  Negative for chills and fever.   HENT: Negative.     Eyes: Negative.    Respiratory: Negative.     Cardiovascular:  Positive for leg swelling.   Gastrointestinal: Negative.  Negative for abdominal pain, constipation, diarrhea, nausea and vomiting.   Genitourinary: Negative.  Negative for dysuria, flank pain, frequency, hematuria and urgency.   Musculoskeletal: Negative.  Negative for back pain.   Skin: Negative.    Neurological: Negative.    Endo/Heme/Allergies: Negative.     Psychiatric/Behavioral: Negative.         PMHx:  Past Medical History:   Diagnosis Date    JACKSON (acute kidney injury) 12/4/2016    BPH (benign prostatic hyperplasia)     Edema     Esophagus disorder     esophagus spasms    GERD without esophagitis     Gout     Mixed hyperlipidemia     Obesity (BMI 30-39.9)        PSHx:  Past Surgical History:   Procedure Laterality Date    ANGIOGRAM, CORONARY, WITH LEFT HEART CATHETERIZATION Left 5/3/2021    Procedure: Angiogram, Coronary, with Left Heart Cath;  Surgeon: Flakito Winter MD;  Location: [facility] CATH;  Service: Cardiology;  Laterality: Left;    CARDIAC SURGERY  2003    CABG 1 vessel    CHOLECYSTECTOMY      COLON SURGERY  2007?    resection /perforated colon    CORONARY ANGIOGRAPHY N/A 7/24/2018    Procedure: Coronary angiography;  Surgeon: Italo Mckeon MD;  Location: [facility] CATH;  Service: Cardiology;  Laterality: N/A;    CORONARY ANGIOPLASTY WITH STENT PLACEMENT      5 stents, last one 2015    CORONARY STENT PLACEMENT N/A 7/24/2018    Procedure: INSERTION, STENT, CORONARY ARTERY;  Surgeon: Italo Mckeon MD;  Location: [facility] CATH;  Service: Cardiology;  Laterality: N/A;    HEMORRHOID SURGERY      INCISION OF PERIRECTAL ABSCESS N/A 3/25/2020    Procedure: INCISION, ABSCESS, PERIRECTAL;  Surgeon: Nayan Mack MD;  Location: [facility] OR;  Service: General;  Laterality: N/A;    INSERTION OF PACEMAKER Left 5/4/2021    Procedure: INSERTION, PACEMAKER;  Surgeon: Flakito Winter MD;  Location: [facility] CATH;  Service: Cardiology;  Laterality: Left;    KNEE ARTHROSCOPY W/ MENISCAL REPAIR Right     KYPHOSIS SURGERY      LEFT HEART CATHETERIZATION N/A 7/24/2018    Procedure: Left heart cath;  Surgeon: Italo Mckeon MD;  Location: [facility] CATH;  Service: Cardiology;  Laterality: N/A;       Fam Hx:   malignancies: No    kidney stones: No     Soc Hx:  , lives in [place]    Allergies:  Ace inhibitors and Tikosyn [dofetilide]    Medications: reviewed     Objective:   There were no  vitals filed for this visit.    Physical Exam  Constitutional:       Appearance: Normal appearance.   HENT:      Head: Normocephalic.      Mouth/Throat:      Mouth: Mucous membranes are moist.   Eyes:      Conjunctiva/sclera: Conjunctivae normal.   Pulmonary:      Effort: Pulmonary effort is normal.   Abdominal:      General: There is no distension.      Palpations: Abdomen is soft.      Tenderness: There is no abdominal tenderness. There is no right CVA tenderness or left CVA tenderness.   Musculoskeletal:         General: Normal range of motion.      Cervical back: Normal range of motion.      Right lower leg: Edema present.      Left lower leg: Edema present.   Skin:     General: Skin is warm.   Neurological:      Mental Status: He is alert and oriented to person, place, and time.   Psychiatric:         Mood and Affect: Mood normal.         Behavior: Behavior normal.        EXAM performed by me in office today:  Inspection of anus and perineum normal  WINDY: 45g prostate gland without nodules, masses, tenderness. SV not palpable. Normal sphincter tone.     LABS REVIEW:  UA today:  Color:Clear, Yellow  Spec. Grav.  1.010  PH  6.5  Negative for leukocytes, nitrates, protein, glucose, ketones, urobili, bili, and blood.    Assessment:       1. Benign prostatic hyperplasia with weak urinary stream    2. Abnormal PSA          Plan:     No need to continue PSA or WINDY  Can continue yearly PSA  BPH on observation    F/u as needed    [facility]: Active    KEITH Biggs

## 2023-11-21 RX ORDER — GLIPIZIDE 5 MG/1
5 TABLET ORAL DAILY
Qty: 90 TABLET | Refills: 1 | Status: SHIPPED | OUTPATIENT
Start: 2023-11-21

## 2023-11-21 NOTE — TELEPHONE ENCOUNTER
No care due was identified.  [facility] Embedded Care Due Messages. Reference number: [id number].   11/21/2023 4:11:47 PM CST

## 2023-11-21 NOTE — TELEPHONE ENCOUNTER
Refill Decision Note   Nate Niurka  is requesting a refill authorization.  Brief Assessment and Rationale for Refill:  Approve     Medication Therapy Plan:   No dose adjustment recommended per renal fxn.        Pharmacist review requested: Yes   Extended chart review required: Yes   Comments:     Note composed:5:26 PM 11/21/2023

## 2023-11-21 NOTE — TELEPHONE ENCOUNTER
Refill Routing Note   Medication(s) are not appropriate for processing by [facility] for the following reason(s):        Required labs abnormal:     [facility] action(s):  Defer      Medication Therapy Plan:       Pharmacist review requested: Yes     Appointments  past 12m or future 3m with PCP    Date Provider   Last Visit   9/21/2023 Nate Parekh MD   Next Visit   3/21/2024 Nate Parekh MD   ED visits in past 90 days: 0        Note composed:4:53 PM 11/21/2023

## 2024-01-09 LAB
LEFT EYE DM RETINOPATHY: NEGATIVE
RIGHT EYE DM RETINOPATHY: NEGATIVE

## 2024-01-22 ENCOUNTER — PATIENT OUTREACH (OUTPATIENT)
Dept: ADMINISTRATIVE | Facility: HOSPITAL | Age: 79
End: 2024-01-22
Payer: MEDICARE

## 2024-02-20 PROBLEM — Z95.0 CARDIAC PACEMAKER IN SITU: Status: ACTIVE | Noted: 2024-02-20

## 2024-02-20 PROBLEM — Z95.1 S/P CABG X 1: Status: ACTIVE | Noted: 2024-02-20

## 2024-02-20 PROBLEM — I71.21 ANEURYSM OF ASCENDING AORTA WITHOUT RUPTURE: Status: ACTIVE | Noted: 2024-02-20

## 2024-02-20 PROBLEM — N18.32 STAGE 3B CHRONIC KIDNEY DISEASE: Status: ACTIVE | Noted: 2024-02-20

## 2024-02-28 PROBLEM — Z91.89 AT RISK FOR BLEEDING ASSOCIATED WITH ANTICOAGULANTS: Status: ACTIVE | Noted: 2024-02-28

## 2024-02-28 PROBLEM — Z95.818 PRESENCE OF WATCHMAN LEFT ATRIAL APPENDAGE CLOSURE DEVICE: Status: ACTIVE | Noted: 2024-02-28

## 2024-03-21 ENCOUNTER — OFFICE VISIT (OUTPATIENT)
Dept: FAMILY MEDICINE | Facility: CLINIC | Age: 79
End: 2024-03-21
Payer: MEDICARE

## 2024-03-21 ENCOUNTER — LAB VISIT (OUTPATIENT)
Dept: LAB | Facility: HOSPITAL | Age: 79
End: 2024-03-21
Attending: INTERNAL MEDICINE
Payer: MEDICARE

## 2024-03-21 VITALS
HEART RATE: 104 BPM | OXYGEN SATURATION: 98 % | BODY MASS INDEX: 37.29 KG/M2 | WEIGHT: 290.56 LBS | DIASTOLIC BLOOD PRESSURE: 68 MMHG | SYSTOLIC BLOOD PRESSURE: 126 MMHG | HEIGHT: 74 IN

## 2024-03-21 DIAGNOSIS — Z87.891 HISTORY OF NICOTINE DEPENDENCE: ICD-10-CM

## 2024-03-21 DIAGNOSIS — M10.479 OTHER SECONDARY ACUTE GOUT OF FOOT, UNSPECIFIED LATERALITY: ICD-10-CM

## 2024-03-21 DIAGNOSIS — E66.01 CLASS 2 SEVERE OBESITY DUE TO EXCESS CALORIES WITH SERIOUS COMORBIDITY AND BODY MASS INDEX (BMI) OF 37.0 TO 37.9 IN ADULT: ICD-10-CM

## 2024-03-21 DIAGNOSIS — R39.11 BENIGN PROSTATIC HYPERPLASIA WITH URINARY HESITANCY: ICD-10-CM

## 2024-03-21 DIAGNOSIS — K21.9 GASTROESOPHAGEAL REFLUX DISEASE WITHOUT ESOPHAGITIS: ICD-10-CM

## 2024-03-21 DIAGNOSIS — I25.83 CORONARY ARTERY DISEASE DUE TO LIPID RICH PLAQUE: ICD-10-CM

## 2024-03-21 DIAGNOSIS — N18.31 TYPE 2 DIABETES MELLITUS WITH STAGE 3A CHRONIC KIDNEY DISEASE, WITHOUT LONG-TERM CURRENT USE OF INSULIN: ICD-10-CM

## 2024-03-21 DIAGNOSIS — E11.9 TYPE 2 DIABETES MELLITUS WITHOUT COMPLICATION, WITHOUT LONG-TERM CURRENT USE OF INSULIN: ICD-10-CM

## 2024-03-21 DIAGNOSIS — E11.22 TYPE 2 DIABETES MELLITUS WITH STAGE 3A CHRONIC KIDNEY DISEASE, WITHOUT LONG-TERM CURRENT USE OF INSULIN: ICD-10-CM

## 2024-03-21 DIAGNOSIS — I10 ESSENTIAL HYPERTENSION: ICD-10-CM

## 2024-03-21 DIAGNOSIS — N18.31 STAGE 3A CHRONIC KIDNEY DISEASE: ICD-10-CM

## 2024-03-21 DIAGNOSIS — R97.20 ELEVATED PSA, LESS THAN 10 NG/ML: ICD-10-CM

## 2024-03-21 DIAGNOSIS — I25.10 CORONARY ARTERY DISEASE DUE TO LIPID RICH PLAQUE: ICD-10-CM

## 2024-03-21 DIAGNOSIS — N40.1 BENIGN PROSTATIC HYPERPLASIA WITH URINARY HESITANCY: ICD-10-CM

## 2024-03-21 DIAGNOSIS — E78.2 MIXED HYPERLIPIDEMIA: Primary | ICD-10-CM

## 2024-03-21 PROBLEM — R78.81 BACTEREMIA: Status: RESOLVED | Noted: 2023-01-18 | Resolved: 2024-03-21

## 2024-03-21 PROBLEM — E66.813 CLASS 3 SEVERE OBESITY DUE TO EXCESS CALORIES WITH SERIOUS COMORBIDITY AND BODY MASS INDEX (BMI) OF 40.0 TO 44.9 IN ADULT: Status: RESOLVED | Noted: 2020-01-22 | Resolved: 2024-03-21

## 2024-03-21 PROBLEM — K61.1 PERIRECTAL ABSCESS: Status: RESOLVED | Noted: 2020-03-25 | Resolved: 2024-03-21

## 2024-03-21 PROBLEM — R06.2 WHEEZING: Status: RESOLVED | Noted: 2020-01-22 | Resolved: 2024-03-21

## 2024-03-21 PROBLEM — D64.9 ANEMIA: Status: RESOLVED | Noted: 2021-07-28 | Resolved: 2024-03-21

## 2024-03-21 PROBLEM — A02.0 SALMONELLA GASTROENTERITIS: Status: RESOLVED | Noted: 2023-01-17 | Resolved: 2024-03-21

## 2024-03-21 PROBLEM — E86.1 INTRAVASCULAR VOLUME DEPLETION: Status: RESOLVED | Noted: 2023-01-17 | Resolved: 2024-03-21

## 2024-03-21 LAB
CHOLEST SERPL-MCNC: 136 MG/DL (ref 120–199)
CHOLEST/HDLC SERPL: 4.3 {RATIO} (ref 2–5)
ESTIMATED AVG GLUCOSE: 160 MG/DL (ref 68–131)
HBA1C MFR BLD: 7.2 % (ref 4–5.6)
HDLC SERPL-MCNC: 32 MG/DL (ref 40–75)
HDLC SERPL: 23.5 % (ref 20–50)
LDLC SERPL CALC-MCNC: 77.6 MG/DL (ref 63–159)
NONHDLC SERPL-MCNC: 104 MG/DL
TRIGL SERPL-MCNC: 132 MG/DL (ref 30–150)
URATE SERPL-MCNC: 6.1 MG/DL (ref 3.4–7)

## 2024-03-21 PROCEDURE — 1159F MED LIST DOCD IN RCRD: CPT | Mod: CPTII,S$GLB,, | Performed by: INTERNAL MEDICINE

## 2024-03-21 PROCEDURE — 36415 COLL VENOUS BLD VENIPUNCTURE: CPT | Mod: HCNC,PO | Performed by: INTERNAL MEDICINE

## 2024-03-21 PROCEDURE — 84550 ASSAY OF BLOOD/URIC ACID: CPT | Mod: HCNC | Performed by: INTERNAL MEDICINE

## 2024-03-21 PROCEDURE — 80061 LIPID PANEL: CPT | Mod: HCNC | Performed by: INTERNAL MEDICINE

## 2024-03-21 PROCEDURE — 99999 PR PBB SHADOW E&M-EST. PATIENT-LVL IV: CPT | Mod: PBBFAC,HCNC,, | Performed by: INTERNAL MEDICINE

## 2024-03-21 PROCEDURE — 1111F DSCHRG MED/CURRENT MED MERGE: CPT | Mod: CPTII,S$GLB,, | Performed by: INTERNAL MEDICINE

## 2024-03-21 PROCEDURE — 99214 OFFICE O/P EST MOD 30 MIN: CPT | Mod: S$GLB,,, | Performed by: INTERNAL MEDICINE

## 2024-03-21 PROCEDURE — 83036 HEMOGLOBIN GLYCOSYLATED A1C: CPT | Mod: HCNC | Performed by: INTERNAL MEDICINE

## 2024-03-21 PROCEDURE — 1126F AMNT PAIN NOTED NONE PRSNT: CPT | Mod: CPTII,S$GLB,, | Performed by: INTERNAL MEDICINE

## 2024-03-21 PROCEDURE — 2023F DILAT RTA XM W/O RTNOPTHY: CPT | Mod: CPTII,S$GLB,, | Performed by: INTERNAL MEDICINE

## 2024-03-21 PROCEDURE — 3074F SYST BP LT 130 MM HG: CPT | Mod: CPTII,S$GLB,, | Performed by: INTERNAL MEDICINE

## 2024-03-21 PROCEDURE — 1160F RVW MEDS BY RX/DR IN RCRD: CPT | Mod: CPTII,S$GLB,, | Performed by: INTERNAL MEDICINE

## 2024-03-21 PROCEDURE — 3078F DIAST BP <80 MM HG: CPT | Mod: CPTII,S$GLB,, | Performed by: INTERNAL MEDICINE

## 2024-03-21 RX ORDER — ROSUVASTATIN CALCIUM 10 MG/1
10 TABLET, COATED ORAL NIGHTLY
Qty: 90 TABLET | Refills: 3 | Status: SHIPPED | OUTPATIENT
Start: 2024-03-21 | End: 2025-03-21

## 2024-03-21 NOTE — PROGRESS NOTES
"[facility]  Primary Care   [address] [facility]       Patient ID: Nate Bah     Chief Complaint:   Chief Complaint   Patient presents with    Follow-up    Medication Refill        HPI:  Routine office visit and overall I think he is doing well.  Vital signs appear to be controlled.  Since our last office visit he did get a Watchman device in his doing well.  He is due for labs to monitor his diabetes and urine microalbumin and I do want to check his cholesterol again.  He notices that he is getting more myalgias in his lower extremities and that is likely due to his atorvastatin 80 mg that he takes so going to switch him to rosuvastatin 10 mg and will likely check some labs when we see each other again in a few months.  He is coming due for a low-dose CT of his chest I have placed that order.    Review of Systems       Bilateral lower extremity myalgias     Objective:      Physical Exam   Physical Exam       Obese    Vitals:   Vitals:    03/21/24 1057   BP: 126/68   BP Location: Right arm   Patient Position: Sitting   BP Method: Large (Manual)   Pulse: 104   SpO2: 98%   Weight: 131.8 kg (290 lb 9.1 oz)   Height: 6' 2" (1.88 m)        Assessment:           Plan:       Nate Bah  was seen today for follow-up and may need lab work.    Diagnoses and all orders for this visit:    Nate was seen today for follow-up and medication refill.    Diagnoses and all orders for this visit:    Mixed hyperlipidemia  -     rosuvastatin (CRESTOR) 10 MG tablet; Take 1 tablet (10 mg total) by mouth every evening.  Monitor Labs on Rosuvastatin     Elevated PSA, less than 10 ng/ml  Nothing to worry about per Urology     Other secondary acute gout of foot, unspecified laterality  -     Uric Acid; Future  Monitor Labs    Essential hypertension  Controlled with med     Class 2 severe obesity due to excess calories with serious comorbidity and body mass index (BMI) of 37.0 to 37.9 in adult  Stable    Type 2 " diabetes mellitus without complication, without long-term current use of insulin  -     Hemoglobin A1C; Future  -     Lipid Panel; Future  -     Microalbumin/Creatinine Ratio, Urine; Future  Labs today     History of nicotine dependence  -     CT Chest Lung Screening Low Dose; Future    Coronary artery disease + Ectasia, h/o CABG & stentsCABG x 1, LIMA to LAD 8/2003 Dicorte, Stent RCA 2002, stent LCx 7/09, stent ostial LAD, LCx 11/11, 7/18 stent prox LAD 4.0 x 28 Synergy  Stable    Benign prostatic hyperplasia with urinary hesitancy  Monitor symptoms     Stage 3a chronic kidney disease  Stable, Monitor Labs    Gastroesophageal reflux disease without esophagitis  Controlled with med     Type 2 diabetes mellitus with stage 3a chronic kidney disease, without long-term current use of insulin  Monitor Labs          Nate Parekh MD

## 2024-04-06 ENCOUNTER — HOSPITAL ENCOUNTER (OUTPATIENT)
Dept: RADIOLOGY | Facility: HOSPITAL | Age: 79
Discharge: HOME | End: 2024-04-06
Attending: INTERNAL MEDICINE
Payer: MEDICARE

## 2024-04-06 DIAGNOSIS — Z87.891 HISTORY OF NICOTINE DEPENDENCE: ICD-10-CM

## 2024-04-06 PROCEDURE — 71271 CT THORAX LUNG CANCER SCR C-: CPT | Mod: TC,PO

## 2024-04-06 PROCEDURE — 71271 CT THORAX LUNG CANCER SCR C-: CPT | Mod: 26,,, | Performed by: RADIOLOGY

## 2024-05-09 ENCOUNTER — LAB VISIT (OUTPATIENT)
Dept: LAB | Facility: HOSPITAL | Age: 79
End: 2024-05-09
Payer: MEDICARE

## 2024-05-09 ENCOUNTER — OFFICE VISIT (OUTPATIENT)
Dept: UROLOGY | Facility: CLINIC | Age: 79
End: 2024-05-09
Payer: MEDICARE

## 2024-05-09 VITALS — HEIGHT: 74 IN | BODY MASS INDEX: 36.86 KG/M2 | WEIGHT: 287.25 LBS

## 2024-05-09 DIAGNOSIS — R31.0 GROSS HEMATURIA: Primary | ICD-10-CM

## 2024-05-09 DIAGNOSIS — R31.0 GROSS HEMATURIA: ICD-10-CM

## 2024-05-09 LAB
CREAT SERPL-MCNC: 1.2 MG/DL (ref 0.5–1.4)
EST. GFR  (NO RACE VARIABLE): >60 ML/MIN/1.73 M^2

## 2024-05-09 PROCEDURE — 1160F RVW MEDS BY RX/DR IN RCRD: CPT | Mod: CPTII,S$GLB,,

## 2024-05-09 PROCEDURE — 1159F MED LIST DOCD IN RCRD: CPT | Mod: CPTII,S$GLB,,

## 2024-05-09 PROCEDURE — 82565 ASSAY OF CREATININE: CPT

## 2024-05-09 PROCEDURE — 99999 PR PBB SHADOW E&M-EST. PATIENT-LVL III: CPT | Mod: PBBFAC,HCNC,,

## 2024-05-09 PROCEDURE — 3288F FALL RISK ASSESSMENT DOCD: CPT | Mod: CPTII,S$GLB,,

## 2024-05-09 PROCEDURE — 1101F PT FALLS ASSESS-DOCD LE1/YR: CPT | Mod: CPTII,S$GLB,,

## 2024-05-09 PROCEDURE — 36415 COLL VENOUS BLD VENIPUNCTURE: CPT | Mod: PO

## 2024-05-09 PROCEDURE — 99213 OFFICE O/P EST LOW 20 MIN: CPT | Mod: S$GLB,,,

## 2024-05-09 PROCEDURE — 1126F AMNT PAIN NOTED NONE PRSNT: CPT | Mod: CPTII,S$GLB,,

## 2024-05-09 NOTE — PROGRESS NOTES
[facility] Note  Staff: KEITH Biggs    PCP: MD Iglesia    Chief Complaint:  Gross hematuria    Subjective:        HPI: Nate Bah is a 78 y.o. male presents today for evaluation of gross hematuria.  He states this has happened on 3 separate occasions over the past 3 weeks.  He states he was seen by his PCP on 04/29/2024 and his urine was negative for an infection.  He denies any other symptoms.  He states he thinks he passed a small clot.  He states the bleeding resolved after 1 incident.  He denies dysuria, fever, flank pain, abdominal pain, urgency, frequency, incontinence, and difficulty urinating.  He denies a history of kidney stones.  He denies constipation.  We discussed further evaluation with imaging and cystoscopy but he does not wish to pursue cystoscopy at this time.    Questions asked the pt during ov today:  Urgency:  No, urge incontinence?  No  Dysuria: No  Gross Hematuria:Yes - resolved  Straining:No, Hesistancy:No, Intermittency:No}, Weak stream:No    Last PSA Screening:   Lab Results   Component Value Date    PSA 4.8 (H) 09/14/2023    PSA 2.5 03/03/2020    PSA 2.6 08/17/2017    PSADIAG 4.3 (H) 10/20/2023       History of Kidney Stones?:  No    Constipation issues?:  No    REVIEW OF SYSTEMS:  Review of Systems   Constitutional: Negative.  Negative for chills and fever.   HENT: Negative.     Eyes: Negative.    Respiratory: Negative.     Cardiovascular: Negative.    Gastrointestinal: Negative.  Negative for abdominal pain, constipation, diarrhea, nausea and vomiting.   Genitourinary:  Positive for hematuria. Negative for dysuria, flank pain, frequency and urgency.   Musculoskeletal: Negative.  Negative for back pain.   Skin: Negative.    Neurological: Negative.    Endo/Heme/Allergies: Negative.    Psychiatric/Behavioral: Negative.         PMHx:  Past Medical History:   Diagnosis Date    JACKSON (acute kidney injury) 12/04/2016    BPH (benign prostatic hyperplasia)      Cancer     skin cancer to arms    Diabetes mellitus     Edema     Esophagus disorder     esophagus spasms    GERD without esophagitis     Gout     Hypertension     Mixed hyperlipidemia     Obesity (BMI 30-39.9)     Paroxysmal atrial fibrillation     Salmonella gastroenteritis 01/17/2023    Sleep apnea     no Cpap       PSHx:  Past Surgical History:   Procedure Laterality Date    ANGIOGRAM, CORONARY, WITH LEFT HEART CATHETERIZATION Left 05/03/2021    Procedure: Angiogram, Coronary, with Left Heart Cath;  Surgeon: Flakito Winter MD;  Location: [facility] CATH;  Service: Cardiology;  Laterality: Left;    CARDIAC SURGERY  2003    CABG 1 vessel    CHOLECYSTECTOMY      CLOSURE OF LEFT ATRIAL APPENDAGE USING DEVICE  2/28/2024    Procedure: Watchman;  Surgeon: Fritz Del Valle MD;  Location: [facility] CATH;  Service: Cardiology;;    COLON SURGERY  2007?    resection /perforated colon    CORONARY ANGIOGRAPHY N/A 07/24/2018    Procedure: Coronary angiography;  Surgeon: Italo Mckeon MD;  Location: [facility] CATH;  Service: Cardiology;  Laterality: N/A;    CORONARY ANGIOPLASTY WITH STENT PLACEMENT      5 stents, last one 2015    CORONARY STENT PLACEMENT N/A 07/24/2018    Procedure: INSERTION, STENT, CORONARY ARTERY;  Surgeon: Italo Mckeon MD;  Location: [facility] CATH;  Service: Cardiology;  Laterality: N/A;    ECHOCARDIOGRAM,TRANSESOPHAGEAL  2/28/2024    Procedure: (TESFAYE) intra-procedure;  Surgeon: Adalgisa Goodson MD;  Location: [facility] CATH;  Service: Cardiology;;    ECHOCARDIOGRAM,TRANSESOPHAGEAL N/A 4/1/2024    Procedure: Transesophageal echo (TESFAYE) intra-procedure log documentation;  Surgeon: Flakito Winter MD;  Location: [facility];  Service: Cardiology;  Laterality: N/A;  6 week post-implant TESFAYE for Watchman    FOOT SURGERY Right     with hardware    HEMORRHOID SURGERY      INCISION OF PERIRECTAL ABSCESS N/A 03/25/2020    Procedure: INCISION, ABSCESS, PERIRECTAL;  Surgeon: Nayan Mack MD;  Location: [facility] OR;  Service: General;   Laterality: N/A;    INSERTION OF PACEMAKER Left 05/04/2021    Procedure: INSERTION, PACEMAKER;  Surgeon: Flakito Winter MD;  Location: [facility] CATH;  Service: Cardiology;  Laterality: Left;    JOINT REPLACEMENT Left     knee    KNEE ARTHROSCOPY W/ MENISCAL REPAIR Right     KYPHOSIS SURGERY      LEFT HEART CATHETERIZATION N/A 07/24/2018    Procedure: Left heart cath;  Surgeon: Italo Mckeon MD;  Location: [facility] CATH;  Service: Cardiology;  Laterality: N/A;       Fam Hx:   malignancies: No    kidney stones: No     Soc Hx[place]    Allergies:  Ace inhibitors and Tikosyn [dofetilide]    Medications: reviewed     Objective:   There were no vitals filed for this visit.    Physical Exam  Constitutional:       Appearance: Normal appearance.   HENT:      Head: Normocephalic.      Mouth/Throat:      Mouth: Mucous membranes are moist.   Eyes:      Conjunctiva/sclera: Conjunctivae normal.   Pulmonary:      Effort: Pulmonary effort is normal.   Abdominal:      General: There is no distension.      Palpations: Abdomen is soft.      Tenderness: There is no abdominal tenderness. There is no right CVA tenderness or left CVA tenderness.   Musculoskeletal:         General: Normal range of motion.      Cervical back: Normal range of motion.   Skin:     General: Skin is warm.   Neurological:      Mental Status: He is alert and oriented to person, place, and time.   Psychiatric:         Mood and Affect: Mood normal.         Behavior: Behavior normal.         LABS REVIEW:  UA today:  Urinated prior to office visit and urine culture last week negative for infection    Assessment:       1. Gross hematuria          Plan:     Patient agrees to CT urogram- ordered and scheduled  Patient refuses cystoscopy at this time    F/u as needed per treatment plan    [facility]:  Active    KEITH Biggs

## 2024-05-23 ENCOUNTER — TELEPHONE (OUTPATIENT)
Dept: UROLOGY | Facility: CLINIC | Age: 79
End: 2024-05-23
Payer: MEDICARE

## 2024-05-23 DIAGNOSIS — R31.0 GROSS HEMATURIA: Primary | ICD-10-CM

## 2024-05-28 ENCOUNTER — HOSPITAL ENCOUNTER (OUTPATIENT)
Dept: RADIOLOGY | Facility: HOSPITAL | Age: 79
Discharge: HOME | End: 2024-05-28
Payer: MEDICARE

## 2024-05-28 DIAGNOSIS — R31.0 GROSS HEMATURIA: ICD-10-CM

## 2024-05-28 PROCEDURE — 74178 CT ABD&PLV WO CNTR FLWD CNTR: CPT | Mod: 26,HCNC,, | Performed by: RADIOLOGY

## 2024-05-28 PROCEDURE — 25500020 PHARM REV CODE 255: Mod: HCNC,PO

## 2024-05-28 PROCEDURE — 74178 CT ABD&PLV WO CNTR FLWD CNTR: CPT | Mod: TC,HCNC,PO

## 2024-05-28 RX ADMIN — IOHEXOL 125 ML: 350 INJECTION, SOLUTION INTRAVENOUS at 04:05

## 2024-06-04 RX ORDER — GLIPIZIDE 5 MG/1
TABLET ORAL
Qty: 90 TABLET | Refills: 1 | Status: SHIPPED | OUTPATIENT
Start: 2024-06-04

## 2024-06-05 NOTE — TELEPHONE ENCOUNTER
Refill Decision Note   Nate Niurka  is requesting a refill authorization.  Brief Assessment and Rationale for Refill:  Approve     Medication Therapy Plan:        Comments:     Note composed:11:10 PM 06/04/2024

## 2024-06-05 NOTE — TELEPHONE ENCOUNTER
No care due was identified.  [facility] Embedded Care Due Messages. Reference number: [id number].   6/04/2024 10:58:54 PM CDT

## 2024-06-11 ENCOUNTER — TELEPHONE (OUTPATIENT)
Dept: UROLOGY | Facility: CLINIC | Age: 79
End: 2024-06-11

## 2024-06-11 ENCOUNTER — PROCEDURE VISIT (OUTPATIENT)
Dept: UROLOGY | Facility: CLINIC | Age: 79
End: 2024-06-11
Payer: MEDICARE

## 2024-06-11 VITALS — HEIGHT: 74 IN | BODY MASS INDEX: 36.22 KG/M2 | WEIGHT: 282.19 LBS

## 2024-06-11 DIAGNOSIS — R31.0 GROSS HEMATURIA: Primary | ICD-10-CM

## 2024-06-11 DIAGNOSIS — R31.0 GROSS HEMATURIA: ICD-10-CM

## 2024-06-11 DIAGNOSIS — N32.89 BLADDER MASS: ICD-10-CM

## 2024-06-11 LAB
BILIRUBIN, UA POC OHS: NEGATIVE
BLOOD, UA POC OHS: ABNORMAL
CLARITY, UA POC OHS: ABNORMAL
COLOR, UA POC OHS: YELLOW
GLUCOSE, UA POC OHS: NEGATIVE
KETONES, UA POC OHS: NEGATIVE
LEUKOCYTES, UA POC OHS: NEGATIVE
NITRITE, UA POC OHS: NEGATIVE
PH, UA POC OHS: 6
PROTEIN, UA POC OHS: NEGATIVE
SPECIFIC GRAVITY, UA POC OHS: 1.01
UROBILINOGEN, UA POC OHS: 0.2

## 2024-06-11 PROCEDURE — 81003 URINALYSIS AUTO W/O SCOPE: CPT | Mod: QW,HCNC,S$GLB, | Performed by: STUDENT IN AN ORGANIZED HEALTH CARE EDUCATION/TRAINING PROGRAM

## 2024-06-11 PROCEDURE — 52000 CYSTOURETHROSCOPY: CPT | Mod: HCNC,S$GLB,, | Performed by: STUDENT IN AN ORGANIZED HEALTH CARE EDUCATION/TRAINING PROGRAM

## 2024-06-11 NOTE — PROCEDURES
Cystoscopy    Date/Time: 6/11/2024 2:30 PM    Performed by: Lazaro Julian MD  Authorized by: Joellen Neal NP      Procedure:   Flexible cysto-urethroscopy    Pre Procedure Diagnosis:  gross hematuria    Post Procedure Diagnosis:  Same and bladder tumor    Surgeon: Lazaro Julian MD     Anesthesia: 2% uro-jet lidocaine jelly for local analgesia    Procedure note:  The risks and benefits were explained and informed consent was obtained. 2% lidocaine urojet was used for local analgesia. The genitalia was prepped and draped in the sterile fashion.    The flexible scope was advanced into the urethra and into the bladder. The urethra was noted to have mild narrowing at the bulbar urethral which was able to be navigated with the scope.    The bladder was completely surveyed in a systematic fashion. The bilateral ureteral orifices were identified in their normal orthotopic locations bilaterally. There were no foreign bodies or stones. There were 1+ trabeculations and no diverticula. An abnormality was seen: sessile tumor over the right ureteral ridge measuring 1 cm .     Upon retroflexion there was no significant median lobe enlargement. As the flexible cystoscope was being withdrawn, the bladder neck and prostate were evaluated. The lateral lobes of the prostate were mildly enlarged. The estimated prostatic urethral length was 3 cm.     The patient tolerated the procedure well without complication.     Findings in summary:  Bladder tumor         Plan:  TURBT with gemcitabine, possible Right RPG and stent  Hold aspirin if amenable per cardiology  Follow up for surgery

## 2024-07-02 ENCOUNTER — PATIENT MESSAGE (OUTPATIENT)
Dept: UROLOGY | Facility: CLINIC | Age: 79
End: 2024-07-02
Payer: MEDICARE

## 2024-07-05 ENCOUNTER — TELEPHONE (OUTPATIENT)
Dept: UROLOGY | Facility: CLINIC | Age: 79
End: 2024-07-05
Payer: MEDICARE

## 2024-07-10 PROBLEM — N32.89 BLADDER MASS: Status: ACTIVE | Noted: 2024-07-10

## 2024-07-12 ENCOUNTER — TELEPHONE (OUTPATIENT)
Dept: UROLOGY | Facility: CLINIC | Age: 79
End: 2024-07-12
Payer: MEDICARE

## 2024-07-12 DIAGNOSIS — N32.89 BLADDER MASS: Primary | ICD-10-CM

## 2024-07-12 RX ORDER — OXYCODONE AND ACETAMINOPHEN 5; 325 MG/1; MG/1
1 TABLET ORAL EVERY 6 HOURS PRN
Qty: 15 TABLET | Refills: 0 | Status: SHIPPED | OUTPATIENT
Start: 2024-07-12

## 2024-07-12 NOTE — TELEPHONE ENCOUNTER
----- Message from Hannah Barreto sent at 7/12/2024 10:40 AM CDT -----  Contact: Maye Day  Type:  Needs Medical Advice    Who Called:   Maye Day    Pharmacy name and phone #:        [facility] - [place] [address] [address] [address] [facility] [address]  Phone: [phone] Fax: [phone]    Would the patient rather a call back or a response via [facility]?   Call back  Best Call Back Number:   [phone] - Maye    Additional Information:   States she would like to speak with someone - states he was just discharged from the hospital and in great pain - states not sleeping - please call - thank you

## 2024-07-12 NOTE — TELEPHONE ENCOUNTER
----- Message from Shira Beltre MA sent at 7/12/2024 11:18 AM CDT -----  Contact: Maye Day daughter is calling in and stating he is in a lot of pain and is wondering if you can call a pain medication into pharmacy. Pharmacy [facility]  ----- Message -----  From: Hannah Barreto  Sent: 7/12/2024  10:42 AM CDT  To: Eliel Willis Staff    Type:  Needs Medical Advice    Who Called:   Maye Day    Pharmacy name and phone #:        [facility] - [place] [address] [address] [address] [facility] [address]  Phone: [phone] Fax: [phone]    Would the patient rather a call back or a response via [facility]?   Call back  Best Call Back Number:   [phone] - Maye    Additional Information:   States she would like to speak with someone - states he was just discharged from the hospital and in great pain - states not sleeping - please call - thank you

## 2024-07-15 ENCOUNTER — CLINICAL SUPPORT (OUTPATIENT)
Dept: UROLOGY | Facility: CLINIC | Age: 79
End: 2024-07-15
Payer: MEDICARE

## 2024-07-15 DIAGNOSIS — R39.12 BENIGN PROSTATIC HYPERPLASIA WITH WEAK URINARY STREAM: Primary | ICD-10-CM

## 2024-07-15 DIAGNOSIS — N40.1 BENIGN PROSTATIC HYPERPLASIA WITH WEAK URINARY STREAM: Primary | ICD-10-CM

## 2024-07-15 DIAGNOSIS — Z95.0 PRESENCE OF CARDIAC PACEMAKER: ICD-10-CM

## 2024-07-15 DIAGNOSIS — R00.1 BRADYCARDIA, UNSPECIFIED: ICD-10-CM

## 2024-07-15 DIAGNOSIS — I44.1 ATRIOVENTRICULAR BLOCK, SECOND DEGREE: ICD-10-CM

## 2024-07-15 PROCEDURE — 99999 PR PBB SHADOW E&M-EST. PATIENT-LVL I: CPT | Mod: PBBFAC,HCNC,,

## 2024-07-15 NOTE — PROGRESS NOTES
Pt coming in for removal of yang catheter. Withdrew 27  Cc from balloon and removed 24 Fr yang catheter intact. Provided voiding trial instruction. Pt expressed understanding and tolerated well.

## 2024-07-19 ENCOUNTER — TELEPHONE (OUTPATIENT)
Dept: UROLOGY | Facility: CLINIC | Age: 79
End: 2024-07-19
Payer: MEDICARE

## 2024-07-19 NOTE — TELEPHONE ENCOUNTER
----- Message from Hodan Julio sent at 7/19/2024  9:29 AM CDT -----  Regarding: advise  Contact: pt daughter in law  Type: Needs Medical Advice  Who Called:  patient daughter in law   Symptoms (please be specific):  back on plavix   How long has patient had these symptoms:    Pharmacy name and phone #:   Best Call Back Number: [phone]  Additional Information: he is now urinating blood call to advise

## 2024-07-23 ENCOUNTER — OFFICE VISIT (OUTPATIENT)
Dept: UROLOGY | Facility: CLINIC | Age: 79
End: 2024-07-23
Payer: MEDICARE

## 2024-07-23 VITALS — WEIGHT: 282.88 LBS | BODY MASS INDEX: 36.3 KG/M2 | HEIGHT: 74 IN

## 2024-07-23 DIAGNOSIS — C67.8 MALIGNANT NEOPLASM OF OVERLAPPING SITES OF BLADDER: Primary | ICD-10-CM

## 2024-07-23 LAB
BILIRUBIN, UA POC OHS: NEGATIVE
BLOOD, UA POC OHS: ABNORMAL
CLARITY, UA POC OHS: ABNORMAL
COLOR, UA POC OHS: ABNORMAL
GLUCOSE, UA POC OHS: NEGATIVE
KETONES, UA POC OHS: NEGATIVE
LEUKOCYTES, UA POC OHS: ABNORMAL
NITRITE, UA POC OHS: NEGATIVE
PH, UA POC OHS: 6.5
PROTEIN, UA POC OHS: NEGATIVE
SPECIFIC GRAVITY, UA POC OHS: 1.01
UROBILINOGEN, UA POC OHS: 0.2

## 2024-07-23 PROCEDURE — 99999 PR PBB SHADOW E&M-EST. PATIENT-LVL IV: CPT | Mod: PBBFAC,HCNC,, | Performed by: STUDENT IN AN ORGANIZED HEALTH CARE EDUCATION/TRAINING PROGRAM

## 2024-07-23 PROCEDURE — 1101F PT FALLS ASSESS-DOCD LE1/YR: CPT | Mod: HCNC,CPTII,S$GLB, | Performed by: STUDENT IN AN ORGANIZED HEALTH CARE EDUCATION/TRAINING PROGRAM

## 2024-07-23 PROCEDURE — 81003 URINALYSIS AUTO W/O SCOPE: CPT | Mod: QW,HCNC,S$GLB, | Performed by: STUDENT IN AN ORGANIZED HEALTH CARE EDUCATION/TRAINING PROGRAM

## 2024-07-23 PROCEDURE — 1126F AMNT PAIN NOTED NONE PRSNT: CPT | Mod: HCNC,CPTII,S$GLB, | Performed by: STUDENT IN AN ORGANIZED HEALTH CARE EDUCATION/TRAINING PROGRAM

## 2024-07-23 PROCEDURE — 3288F FALL RISK ASSESSMENT DOCD: CPT | Mod: HCNC,CPTII,S$GLB, | Performed by: STUDENT IN AN ORGANIZED HEALTH CARE EDUCATION/TRAINING PROGRAM

## 2024-07-23 PROCEDURE — 1160F RVW MEDS BY RX/DR IN RCRD: CPT | Mod: HCNC,CPTII,S$GLB, | Performed by: STUDENT IN AN ORGANIZED HEALTH CARE EDUCATION/TRAINING PROGRAM

## 2024-07-23 PROCEDURE — 99215 OFFICE O/P EST HI 40 MIN: CPT | Mod: HCNC,S$GLB,, | Performed by: STUDENT IN AN ORGANIZED HEALTH CARE EDUCATION/TRAINING PROGRAM

## 2024-07-23 PROCEDURE — 1159F MED LIST DOCD IN RCRD: CPT | Mod: HCNC,CPTII,S$GLB, | Performed by: STUDENT IN AN ORGANIZED HEALTH CARE EDUCATION/TRAINING PROGRAM

## 2024-07-23 PROCEDURE — G2211 COMPLEX E/M VISIT ADD ON: HCPCS | Mod: HCNC,S$GLB,, | Performed by: STUDENT IN AN ORGANIZED HEALTH CARE EDUCATION/TRAINING PROGRAM

## 2024-07-23 NOTE — PATIENT INSTRUCTIONS
Bladder Cancer    National Comprehensive Cancer Network Guideline For Patients    https://www.nccn.org/patients/guidelines/content/PDF/bladder-patient.pdf

## 2024-07-23 NOTE — PROGRESS NOTES
"[place] [facility]   Clinic Note    Subjective:     Chief Complaint: Post-op Evaluation (2 week )      History of Present Illness:  Nate Bah is a 78 y.o. male who presents to clinic for evaluation and management of bladder cancer. He is established to our clinic    He was found to have a bladder tumor on cystoscopic evaluation for hematuria. He underwent TURBT with gemcitabine. He stayed overnight on CBI due to hematuria. He has been doing well since yang removal. He has been back on plavix and voiding clear the last few days. He presents today to discuss pathology and next steps.     Bladder Cancer Chronology:    7/10/2024 - TURBT - HG Ta, 2.5 cm, muscle present and not involved, intermediate risk    Past medical, family, surgical and social history reviewed as documented in chart with pertinent positive medical, family, surgical and social history detailed in HPI.    A review systems was conducted with pertinent positive and negative findings documented in HPI.    Objective:     Estimated body mass index is 36.32 kg/m² as calculated from the following:    Height as of this encounter: 6' 2" (1.88 m).    Weight as of this encounter: 128.3 kg (282 lb 13.6 oz).    Vital Signs (Most Recent)       Physical Exam  Constitutional:       General: He is not in acute distress.     Appearance: He is not ill-appearing or toxic-appearing.   Pulmonary:      Effort: Pulmonary effort is normal. No accessory muscle usage or respiratory distress.   Neurological:      Mental Status: He is alert.         Labs reviewed below:  Lab Results   Component Value Date    BUN 18 07/02/2024    CREATININE 1.07 07/02/2024    WBC 7.30 07/02/2024    HGB 14.5 07/02/2024    HCT 42.8 07/02/2024     07/02/2024    AST 27 06/20/2024    ALT 24 06/20/2024    ALKPHOS 90 06/20/2024    ALBUMIN 4.5 06/20/2024    HGBA1C 7.0 (H) 07/02/2024        PSA trend reviewed below:  Lab Results   Component Value Date    PSA 4.8 (H) 09/14/2023    PSA 2.5 " 03/03/2020    PSA 2.6 08/17/2017    PSADIAG 4.3 (H) 10/20/2023      Urine dipstick today showed large blood, trace leukocyte esterase, and negative nitrite.     Assessment:     1. Malignant neoplasm of overlapping sites of bladder      Plan:     I discussed his diagnosis of urothelial carcinoma of the bladder, i.e, bladder cancer. I reviewed the pathology including the grade and stage.We reviewed the risk categories and implications. The patient has intermediate risk disease.     I explained that individuals with non-muscle invasive bladder cancer account for 75-80% of those with bladder cancer. Nearly all of such patients exhibit urothelial carcinoma histology. We discussed that tumor grade and tumor stage in the setting of a properly-performed transurethral resection are the variables that largely guide prognostication and treatment.  Some of the other risk factors include presence of CIS, multifocality, tumor size >3 cm, lymphovascular invasion, and rapid tumor recurrence. Management of patients with this condition focuses on prevention of disease progression. We discussed that approximately 15-25% of all patients with non-muscle invasive disease will progress to muscle-invasive pathology. Additionally, the majority of patients with urothelial carcinoma will exhibit tumor recurrence (up to 75%). I emphasized that endoscopic management along with upper tract surveillance are the mainstay treatments of non-muscle invasive bladder cancer. Depending on the risk category, intravesical chemotherapy with gemcitabine or mitomycin C or immunotherapy with Bacillus Calmette-Juvenal (BCG) may be utilized for treatment.    Discussed the risks of BCG, side effects, indications. Recommend induction BCG in 4 weeks.  Renal US due to the resected UO  Surveillance cysto in 3 months    Lazaro Julian MD

## 2024-07-30 ENCOUNTER — TELEPHONE (OUTPATIENT)
Dept: UROLOGY | Facility: CLINIC | Age: 79
End: 2024-07-30
Payer: MEDICARE

## 2024-07-30 PROBLEM — R31.0 HEMATURIA, GROSS: Status: ACTIVE | Noted: 2024-07-30

## 2024-07-30 PROBLEM — C68.0: Status: ACTIVE | Noted: 2024-07-30

## 2024-07-30 NOTE — TELEPHONE ENCOUNTER
----- Message from Tatiana Rey, Patient Care Assistant sent at 7/30/2024  8:16 AM CDT -----  Regarding: advice  Contact: Maye moralez's daughter  Type: Needs Medical Advice    Who Called:  Maye moralez's daughter     Symptoms (please be specific):  urinating blood   How long has patient had these symptoms:  1 day     Pharmacy name and phone #:    [address] [address]  Phone: [phone] Fax: [phone]    Best Call Back Number: [phone]     Additional Information: please call Maye moralez's daughter to advise. Thanks!

## 2024-07-30 NOTE — TELEPHONE ENCOUNTER
Contact the patients daughter Maye in regards to a portal; message states patient is urinating blood and now is in the hospital. Maye wanted to inform Dr. Julian. Patient daughter will contact the office to follow up.

## 2024-08-05 ENCOUNTER — PATIENT MESSAGE (OUTPATIENT)
Dept: UROLOGY | Facility: CLINIC | Age: 79
End: 2024-08-05
Payer: MEDICARE

## 2024-08-05 ENCOUNTER — PATIENT MESSAGE (OUTPATIENT)
Dept: NEPHROLOGY | Facility: CLINIC | Age: 79
End: 2024-08-05
Payer: MEDICARE

## 2024-08-05 DIAGNOSIS — C67.8 MALIGNANT NEOPLASM OF OVERLAPPING SITES OF BLADDER: Primary | ICD-10-CM

## 2024-08-06 ENCOUNTER — PATIENT MESSAGE (OUTPATIENT)
Dept: FAMILY MEDICINE | Facility: CLINIC | Age: 79
End: 2024-08-06
Payer: MEDICARE

## 2024-08-06 ENCOUNTER — TELEPHONE (OUTPATIENT)
Dept: NEPHROLOGY | Facility: CLINIC | Age: 79
End: 2024-08-06
Payer: MEDICARE

## 2024-08-08 ENCOUNTER — OFFICE VISIT (OUTPATIENT)
Dept: UROLOGY | Facility: CLINIC | Age: 79
End: 2024-08-08
Payer: MEDICARE

## 2024-08-08 VITALS — BODY MASS INDEX: 37.19 KG/M2 | WEIGHT: 289.69 LBS

## 2024-08-08 DIAGNOSIS — C67.8 MALIGNANT NEOPLASM OF OVERLAPPING SITES OF BLADDER: Primary | ICD-10-CM

## 2024-08-08 DIAGNOSIS — N40.1 BENIGN PROSTATIC HYPERPLASIA WITH WEAK URINARY STREAM: ICD-10-CM

## 2024-08-08 DIAGNOSIS — R39.12 BENIGN PROSTATIC HYPERPLASIA WITH WEAK URINARY STREAM: ICD-10-CM

## 2024-08-08 LAB
BILIRUBIN, UA POC OHS: NEGATIVE
BLOOD, UA POC OHS: ABNORMAL
CLARITY, UA POC OHS: CLEAR
COLOR, UA POC OHS: YELLOW
GLUCOSE, UA POC OHS: NEGATIVE
KETONES, UA POC OHS: NEGATIVE
LEUKOCYTES, UA POC OHS: NEGATIVE
NITRITE, UA POC OHS: NEGATIVE
PH, UA POC OHS: 6
PROTEIN, UA POC OHS: 30
SPECIFIC GRAVITY, UA POC OHS: 1.02
UROBILINOGEN, UA POC OHS: 0.2

## 2024-08-08 PROCEDURE — 1101F PT FALLS ASSESS-DOCD LE1/YR: CPT | Mod: HCNC,CPTII,S$GLB, | Performed by: STUDENT IN AN ORGANIZED HEALTH CARE EDUCATION/TRAINING PROGRAM

## 2024-08-08 PROCEDURE — 1159F MED LIST DOCD IN RCRD: CPT | Mod: HCNC,CPTII,S$GLB, | Performed by: STUDENT IN AN ORGANIZED HEALTH CARE EDUCATION/TRAINING PROGRAM

## 2024-08-08 PROCEDURE — 99999 PR PBB SHADOW E&M-EST. PATIENT-LVL III: CPT | Mod: PBBFAC,HCNC,, | Performed by: STUDENT IN AN ORGANIZED HEALTH CARE EDUCATION/TRAINING PROGRAM

## 2024-08-08 PROCEDURE — 1160F RVW MEDS BY RX/DR IN RCRD: CPT | Mod: HCNC,CPTII,S$GLB, | Performed by: STUDENT IN AN ORGANIZED HEALTH CARE EDUCATION/TRAINING PROGRAM

## 2024-08-08 PROCEDURE — 99215 OFFICE O/P EST HI 40 MIN: CPT | Mod: HCNC,S$GLB,, | Performed by: STUDENT IN AN ORGANIZED HEALTH CARE EDUCATION/TRAINING PROGRAM

## 2024-08-08 PROCEDURE — 1126F AMNT PAIN NOTED NONE PRSNT: CPT | Mod: HCNC,CPTII,S$GLB, | Performed by: STUDENT IN AN ORGANIZED HEALTH CARE EDUCATION/TRAINING PROGRAM

## 2024-08-08 PROCEDURE — 81003 URINALYSIS AUTO W/O SCOPE: CPT | Mod: QW,HCNC,S$GLB, | Performed by: STUDENT IN AN ORGANIZED HEALTH CARE EDUCATION/TRAINING PROGRAM

## 2024-08-08 PROCEDURE — 3288F FALL RISK ASSESSMENT DOCD: CPT | Mod: HCNC,CPTII,S$GLB, | Performed by: STUDENT IN AN ORGANIZED HEALTH CARE EDUCATION/TRAINING PROGRAM

## 2024-08-08 PROCEDURE — G2211 COMPLEX E/M VISIT ADD ON: HCPCS | Mod: HCNC,S$GLB,, | Performed by: STUDENT IN AN ORGANIZED HEALTH CARE EDUCATION/TRAINING PROGRAM

## 2024-08-08 PROCEDURE — 1111F DSCHRG MED/CURRENT MED MERGE: CPT | Mod: HCNC,CPTII,S$GLB, | Performed by: STUDENT IN AN ORGANIZED HEALTH CARE EDUCATION/TRAINING PROGRAM

## 2024-08-08 RX ORDER — TAMSULOSIN HYDROCHLORIDE 0.4 MG/1
0.4 CAPSULE ORAL NIGHTLY
Qty: 30 CAPSULE | Refills: 11 | Status: SHIPPED | OUTPATIENT
Start: 2024-08-08 | End: 2025-08-08

## 2024-08-14 ENCOUNTER — TELEPHONE (OUTPATIENT)
Dept: INFUSION THERAPY | Facility: HOSPITAL | Age: 79
End: 2024-08-14
Payer: MEDICARE

## 2024-08-14 NOTE — TELEPHONE ENCOUNTER
----- Message from Shira Beltre MA sent at 8/14/2024  2:16 PM CDT -----    ----- Message -----  From: Lazaro Julian MD  Sent: 8/14/2024   2:10 PM CDT  To: Shira Beltre MA    Let's do the BCG mid-September. I have another patient that needs it too that can be split with.  ----- Message -----  From: Shira Beltre MA  Sent: 8/14/2024   1:37 PM CDT  To: Lazaro Julian MD      ----- Message -----  From: Corazon Victor RN  Sent: 8/14/2024   1:14 PM CDT  To: Eliel Willis Staff    Good afternoon,     The attached patient has been authorized to receive bladder BCG treatments, however we currently do not have enough doses to give him and complete his rounds of therapy.  We would be able to offer him a split dose with another patient for 2 doses but we will run out before he could finish.  Our next allocation should be available mid-September.  Would you like us to wait until we have enough to give him all 6 doses?  I don't know if he would meet criteria but there have been some docs that have changed to a different drug giving our ongoing shortage... would changing the drug be appropriate in his circumstance?      We welcome any suggestions.  We will be reaching out to other locations as well to see if they have any unassigned doses to spare, but wanted to make you aware of the current situation.    Corina REBOLLAR RN

## 2024-08-20 ENCOUNTER — HOSPITAL ENCOUNTER (OUTPATIENT)
Dept: RADIOLOGY | Facility: HOSPITAL | Age: 79
Discharge: HOME | End: 2024-08-20
Attending: STUDENT IN AN ORGANIZED HEALTH CARE EDUCATION/TRAINING PROGRAM
Payer: MEDICARE

## 2024-08-20 DIAGNOSIS — C67.8 MALIGNANT NEOPLASM OF OVERLAPPING SITES OF BLADDER: ICD-10-CM

## 2024-08-20 PROCEDURE — 76775 US EXAM ABDO BACK WALL LIM: CPT | Mod: TC,HCNC,PO

## 2024-08-20 PROCEDURE — 76775 US EXAM ABDO BACK WALL LIM: CPT | Mod: 26,HCNC,, | Performed by: RADIOLOGY

## 2024-08-22 ENCOUNTER — PATIENT MESSAGE (OUTPATIENT)
Dept: UROLOGY | Facility: CLINIC | Age: 79
End: 2024-08-22
Payer: MEDICARE

## 2024-08-30 ENCOUNTER — PATIENT MESSAGE (OUTPATIENT)
Dept: INFUSION THERAPY | Facility: HOSPITAL | Age: 79
End: 2024-08-30
Payer: MEDICARE

## 2024-09-05 ENCOUNTER — INFUSION (OUTPATIENT)
Dept: INFUSION THERAPY | Facility: HOSPITAL | Age: 79
End: 2024-09-05
Attending: STUDENT IN AN ORGANIZED HEALTH CARE EDUCATION/TRAINING PROGRAM
Payer: MEDICARE

## 2024-09-05 VITALS
HEART RATE: 74 BPM | SYSTOLIC BLOOD PRESSURE: 135 MMHG | BODY MASS INDEX: 36.36 KG/M2 | WEIGHT: 283.31 LBS | HEIGHT: 74 IN | OXYGEN SATURATION: 99 % | DIASTOLIC BLOOD PRESSURE: 68 MMHG | TEMPERATURE: 98 F | RESPIRATION RATE: 16 BRPM

## 2024-09-05 DIAGNOSIS — C67.8 MALIGNANT NEOPLASM OF OVERLAPPING SITES OF BLADDER: Primary | ICD-10-CM

## 2024-09-05 LAB
BILIRUB UR QL STRIP: NEGATIVE
CLARITY UR: CLEAR
COLOR UR: YELLOW
GLUCOSE UR QL STRIP: NEGATIVE
HGB UR QL STRIP: NEGATIVE
KETONES UR QL STRIP: NEGATIVE
LEUKOCYTE ESTERASE UR QL STRIP: NEGATIVE
NITRITE UR QL STRIP: NEGATIVE
PH UR STRIP: 7 [PH] (ref 5–8)
PROT UR QL STRIP: NEGATIVE
SP GR UR STRIP: 1.01 (ref 1–1.03)
URN SPEC COLLECT METH UR: NORMAL

## 2024-09-05 PROCEDURE — 63600175 PHARM REV CODE 636 W HCPCS: Mod: HCNC,PN | Performed by: STUDENT IN AN ORGANIZED HEALTH CARE EDUCATION/TRAINING PROGRAM

## 2024-09-05 PROCEDURE — 81003 URINALYSIS AUTO W/O SCOPE: CPT | Mod: HCNC,PN | Performed by: STUDENT IN AN ORGANIZED HEALTH CARE EDUCATION/TRAINING PROGRAM

## 2024-09-05 PROCEDURE — 25000003 PHARM REV CODE 250: Mod: HCNC,PN | Performed by: STUDENT IN AN ORGANIZED HEALTH CARE EDUCATION/TRAINING PROGRAM

## 2024-09-05 PROCEDURE — 51720 TREATMENT OF BLADDER LESION: CPT | Mod: HCNC,PN

## 2024-09-05 RX ADMIN — SODIUM CHLORIDE 50 MG: 9 INJECTION, SOLUTION INTRAVENOUS at 04:09

## 2024-09-05 NOTE — PLAN OF CARE
Problem: Adult Inpatient Plan of Care  Goal: Patient-Specific Goal (Individualized)  Outcome: Progressing  Flowsheets (Taken 9/5/2024 1709)  Individualized Care Needs: Instructions given on post BCG treatment care.  Anxieties, Fears or Concerns: First treatment     Problem: Fatigue  Goal: Improved Activity Tolerance  Intervention: Promote Improved Energy  Flowsheets (Taken 9/5/2024 1709)  Fatigue Management:   frequent rest breaks encouraged   activity schedule adjusted  Sleep/Rest Enhancement:   regular sleep/rest pattern promoted   relaxation techniques promoted  Activity Management:   Ambulated -L4   Ambulated in [place] - L4  Environmental Support:   calm environment promoted   environmental consistency promoted     Problem: Adult Inpatient Plan of Care  Goal: Plan of Care Review  Outcome: Progressing  Flowsheets (Taken 9/5/2024 1709)  Plan of Care Reviewed With: family  Tolerated treatment with no known distress.  Ambulated from infusion center with steady gait.

## 2024-09-06 ENCOUNTER — PATIENT MESSAGE (OUTPATIENT)
Dept: UROLOGY | Facility: CLINIC | Age: 79
End: 2024-09-06
Payer: MEDICARE

## 2024-09-06 ENCOUNTER — TELEPHONE (OUTPATIENT)
Dept: UROLOGY | Facility: CLINIC | Age: 79
End: 2024-09-06
Payer: MEDICARE

## 2024-09-06 NOTE — TELEPHONE ENCOUNTER
----- Message from Gene Giang sent at 9/6/2024 12:22 PM CDT -----  Contact: Daughter  Type: Needs Medical Advice  Who Called:  Daughter/Maye  Symptoms (please be specific):  Unable to urinate, pain    Best Call Back Number: [phone]  Additional Information: Called to speak with office. States symptoms began after infusion. Please call

## 2024-09-09 NOTE — TELEPHONE ENCOUNTER
We will probably need to postpone the next treatment. Please arrange a voiding trial this Thursday AM.

## 2024-09-12 ENCOUNTER — PATIENT MESSAGE (OUTPATIENT)
Dept: UROLOGY | Facility: CLINIC | Age: 79
End: 2024-09-12
Payer: MEDICARE

## 2024-09-13 ENCOUNTER — CLINICAL SUPPORT (OUTPATIENT)
Dept: UROLOGY | Facility: CLINIC | Age: 79
End: 2024-09-13
Payer: MEDICARE

## 2024-09-13 DIAGNOSIS — R33.9 URINARY RETENTION: Primary | ICD-10-CM

## 2024-09-13 PROCEDURE — 99999 PR PBB SHADOW E&M-EST. PATIENT-LVL III: CPT | Mod: PBBFAC,HCNC,,

## 2024-09-13 NOTE — PROGRESS NOTES
Pt coming in for removal of yang catheter. Withdrew 10 Cc from balloon and removed 16 Fr yang catheter intact. Provided voiding trial instruction. Pt expressed understanding and tolerated well.

## 2024-09-19 ENCOUNTER — INFUSION (OUTPATIENT)
Dept: INFUSION THERAPY | Facility: HOSPITAL | Age: 79
End: 2024-09-19
Attending: STUDENT IN AN ORGANIZED HEALTH CARE EDUCATION/TRAINING PROGRAM
Payer: MEDICARE

## 2024-09-19 VITALS
RESPIRATION RATE: 18 BRPM | TEMPERATURE: 98 F | SYSTOLIC BLOOD PRESSURE: 159 MMHG | OXYGEN SATURATION: 99 % | DIASTOLIC BLOOD PRESSURE: 66 MMHG | BODY MASS INDEX: 35.94 KG/M2 | WEIGHT: 280 LBS | HEART RATE: 64 BPM | HEIGHT: 74 IN

## 2024-09-19 DIAGNOSIS — C67.8 MALIGNANT NEOPLASM OF OVERLAPPING SITES OF BLADDER: Primary | ICD-10-CM

## 2024-09-19 PROCEDURE — 25000003 PHARM REV CODE 250: Mod: HCNC,PN | Performed by: STUDENT IN AN ORGANIZED HEALTH CARE EDUCATION/TRAINING PROGRAM

## 2024-09-19 PROCEDURE — 81003 URINALYSIS AUTO W/O SCOPE: CPT | Mod: HCNC,PN | Performed by: STUDENT IN AN ORGANIZED HEALTH CARE EDUCATION/TRAINING PROGRAM

## 2024-09-19 PROCEDURE — 51720 TREATMENT OF BLADDER LESION: CPT | Mod: HCNC,PN

## 2024-09-19 PROCEDURE — 63600175 PHARM REV CODE 636 W HCPCS: Mod: HCNC,PN | Performed by: STUDENT IN AN ORGANIZED HEALTH CARE EDUCATION/TRAINING PROGRAM

## 2024-09-19 RX ORDER — LIDOCAINE HYDROCHLORIDE 20 MG/ML
JELLY TOPICAL ONCE AS NEEDED
Status: COMPLETED | OUTPATIENT
Start: 2024-09-19 | End: 2024-09-19

## 2024-09-19 RX ORDER — LIDOCAINE HYDROCHLORIDE 20 MG/ML
JELLY TOPICAL ONCE AS NEEDED
Start: 2024-09-26

## 2024-09-19 RX ADMIN — LIDOCAINE HYDROCHLORIDE: 20 JELLY TOPICAL at 03:09

## 2024-09-19 RX ADMIN — SODIUM CHLORIDE 25 MG: 9 INJECTION, SOLUTION INTRAVENOUS at 03:09

## 2024-09-19 NOTE — PLAN OF CARE
Problem: Adult Inpatient Plan of Care  Goal: Patient-Specific Goal (Individualized)  Outcome: Progressing  Flowsheets (Taken 9/19/2024 1605)  Individualized Care Needs: Private room  Anxieties, Fears or Concerns: None     Problem: Fatigue  Goal: Improved Activity Tolerance  Intervention: Promote Improved Energy  Flowsheets (Taken 9/19/2024 1605)  Fatigue Management:   paced activity encouraged   activity schedule adjusted  Sleep/Rest Enhancement: relaxation techniques promoted  Activity Management:   Ambulated -L4   Ambulated in [place] - L4  Environmental Support:   calm environment promoted   environmental consistency promoted     Problem: Adult Inpatient Plan of Care  Goal: Plan of Care Review  Outcome: Progressing  Flowsheets (Taken 9/19/2024 1605)  Plan of Care Reviewed With: patient  Tolerated treatment with no known distress.  Ambulated from infusion center with steady gait.

## 2024-09-24 ENCOUNTER — OFFICE VISIT (OUTPATIENT)
Dept: FAMILY MEDICINE | Facility: CLINIC | Age: 79
End: 2024-09-24
Payer: MEDICARE

## 2024-09-24 VITALS
HEIGHT: 74 IN | HEART RATE: 74 BPM | SYSTOLIC BLOOD PRESSURE: 122 MMHG | OXYGEN SATURATION: 96 % | WEIGHT: 280.63 LBS | DIASTOLIC BLOOD PRESSURE: 66 MMHG | BODY MASS INDEX: 36.01 KG/M2

## 2024-09-24 DIAGNOSIS — E66.01 CLASS 2 SEVERE OBESITY DUE TO EXCESS CALORIES WITH SERIOUS COMORBIDITY AND BODY MASS INDEX (BMI) OF 36.0 TO 36.9 IN ADULT: ICD-10-CM

## 2024-09-24 DIAGNOSIS — N18.31 STAGE 3A CHRONIC KIDNEY DISEASE: ICD-10-CM

## 2024-09-24 DIAGNOSIS — I10 ESSENTIAL HYPERTENSION: ICD-10-CM

## 2024-09-24 DIAGNOSIS — I48.0 PAROXYSMAL ATRIAL FIBRILLATION: ICD-10-CM

## 2024-09-24 DIAGNOSIS — Z00.00 WELLNESS EXAMINATION: Primary | ICD-10-CM

## 2024-09-24 DIAGNOSIS — E78.2 MIXED HYPERLIPIDEMIA: ICD-10-CM

## 2024-09-24 DIAGNOSIS — C67.8 MALIGNANT NEOPLASM OF OVERLAPPING SITES OF BLADDER: ICD-10-CM

## 2024-09-24 DIAGNOSIS — E11.9 TYPE 2 DIABETES MELLITUS WITHOUT COMPLICATION, WITHOUT LONG-TERM CURRENT USE OF INSULIN: ICD-10-CM

## 2024-09-24 PROCEDURE — 1126F AMNT PAIN NOTED NONE PRSNT: CPT | Mod: HCNC,CPTII,S$GLB, | Performed by: INTERNAL MEDICINE

## 2024-09-24 PROCEDURE — 3288F FALL RISK ASSESSMENT DOCD: CPT | Mod: HCNC,CPTII,S$GLB, | Performed by: INTERNAL MEDICINE

## 2024-09-24 PROCEDURE — 99999 PR PBB SHADOW E&M-EST. PATIENT-LVL IV: CPT | Mod: PBBFAC,HCNC,, | Performed by: INTERNAL MEDICINE

## 2024-09-24 PROCEDURE — 99214 OFFICE O/P EST MOD 30 MIN: CPT | Mod: HCNC,S$GLB,, | Performed by: INTERNAL MEDICINE

## 2024-09-24 PROCEDURE — 1159F MED LIST DOCD IN RCRD: CPT | Mod: HCNC,CPTII,S$GLB, | Performed by: INTERNAL MEDICINE

## 2024-09-24 PROCEDURE — 1160F RVW MEDS BY RX/DR IN RCRD: CPT | Mod: HCNC,CPTII,S$GLB, | Performed by: INTERNAL MEDICINE

## 2024-09-24 PROCEDURE — 1101F PT FALLS ASSESS-DOCD LE1/YR: CPT | Mod: HCNC,CPTII,S$GLB, | Performed by: INTERNAL MEDICINE

## 2024-09-24 PROCEDURE — 3074F SYST BP LT 130 MM HG: CPT | Mod: HCNC,CPTII,S$GLB, | Performed by: INTERNAL MEDICINE

## 2024-09-24 PROCEDURE — G2211 COMPLEX E/M VISIT ADD ON: HCPCS | Mod: HCNC,S$GLB,, | Performed by: INTERNAL MEDICINE

## 2024-09-24 PROCEDURE — 3078F DIAST BP <80 MM HG: CPT | Mod: HCNC,CPTII,S$GLB, | Performed by: INTERNAL MEDICINE

## 2024-09-24 PROCEDURE — 2023F DILAT RTA XM W/O RTNOPTHY: CPT | Mod: HCNC,CPTII,S$GLB, | Performed by: INTERNAL MEDICINE

## 2024-09-24 NOTE — PROGRESS NOTES
"[facility]  Primary Care   [address] [facility] [address]       Patient ID: Nate Bah     Chief Complaint:   Chief Complaint   Patient presents with    Follow-up     6 month           HPI:  Annual exam and overall he is doing better now.  Since our last office visit, he has been diagnose a bladder cancer and is getting BCG instillations.  Unfortunately, after the surgery to remove the primary tumor, he had to go back to the ER for urinary outflow obstruction but thankfully that has resolved.  He politely declines a flu shot at this time because he is still getting the BCG but he will confirm with his urologist as to when he can get the flu shot and he can get a any pharmacy if he likes.  Vital signs do look good.  He maybe due for labs check his diabetes but last A1c was pretty good at 7.  I am not sure when Urology wants to check a PSA or even if they want to check a PSA but I will confirm with them.    Review of Systems       Negative     Objective:      Physical Exam   Physical Exam       Obese    Vitals:   Vitals:    09/24/24 1033   BP: 122/66   Pulse: 74   SpO2: 96%   Weight: 127.3 kg (280 lb 10.3 oz)   Height: 6' 2" (1.88 m)        Assessment:           Plan:       Nate Bah  was seen today for follow-up and may need lab work.    Diagnoses and all orders for this visit:    Nate was seen today for follow-up.    Diagnoses and all orders for this visit:    Wellness examination    Essential hypertension  Controlled with losartan    Mixed hyperlipidemia  Controlled with Rosuvastatin     Paroxysmal atrial fibrillation  Controlled with Watchman     Stage 3a chronic kidney disease  Stable- Monitor Labs     Malignant neoplasm of overlapping sites of bladder  Getting BCG from Dr. Julian     Type 2 diabetes mellitus without complication, without long-term current use of insulin  -     Hemoglobin A1C; Future  -     Lipid Panel; Future  -     CBC Auto Differential; Future  -     Comprehensive " Metabolic Panel; Future  Controlled with Glipizide     Class 2 severe obesity due to excess calories with serious comorbidity and body mass index (BMI) of 36.0 to 36.9 in adult  Monitor      Visit today included increased complexity associated with the care of the episodic problem Diabetes hypertension Chronic Kidney Disease 3 Obesity  addressed and managing the longitudinal care of the patient due to the serious and/or complex managed problem(s) .           Nate Parekh MD

## 2024-09-26 ENCOUNTER — OFFICE VISIT (OUTPATIENT)
Dept: NEPHROLOGY | Facility: CLINIC | Age: 79
End: 2024-09-26
Payer: MEDICARE

## 2024-09-26 ENCOUNTER — INFUSION (OUTPATIENT)
Dept: INFUSION THERAPY | Facility: HOSPITAL | Age: 79
End: 2024-09-26
Attending: STUDENT IN AN ORGANIZED HEALTH CARE EDUCATION/TRAINING PROGRAM
Payer: MEDICARE

## 2024-09-26 VITALS
DIASTOLIC BLOOD PRESSURE: 60 MMHG | OXYGEN SATURATION: 97 % | HEART RATE: 72 BPM | SYSTOLIC BLOOD PRESSURE: 120 MMHG | BODY MASS INDEX: 35.65 KG/M2 | HEIGHT: 74 IN

## 2024-09-26 VITALS
HEIGHT: 74 IN | BODY MASS INDEX: 35.94 KG/M2 | DIASTOLIC BLOOD PRESSURE: 71 MMHG | RESPIRATION RATE: 18 BRPM | TEMPERATURE: 98 F | SYSTOLIC BLOOD PRESSURE: 133 MMHG | HEART RATE: 72 BPM | OXYGEN SATURATION: 98 % | WEIGHT: 280 LBS

## 2024-09-26 DIAGNOSIS — I10 ESSENTIAL HYPERTENSION: ICD-10-CM

## 2024-09-26 DIAGNOSIS — E66.01 CLASS 2 SEVERE OBESITY DUE TO EXCESS CALORIES WITH SERIOUS COMORBIDITY AND BODY MASS INDEX (BMI) OF 36.0 TO 36.9 IN ADULT: ICD-10-CM

## 2024-09-26 DIAGNOSIS — N18.31 STAGE 3A CHRONIC KIDNEY DISEASE: Primary | ICD-10-CM

## 2024-09-26 DIAGNOSIS — E78.2 MIXED HYPERLIPIDEMIA: ICD-10-CM

## 2024-09-26 DIAGNOSIS — E11.42 DIABETIC POLYNEUROPATHY ASSOCIATED WITH TYPE 2 DIABETES MELLITUS: ICD-10-CM

## 2024-09-26 DIAGNOSIS — I25.83 CORONARY ARTERY DISEASE DUE TO LIPID RICH PLAQUE: ICD-10-CM

## 2024-09-26 DIAGNOSIS — C67.8 MALIGNANT NEOPLASM OF OVERLAPPING SITES OF BLADDER: Primary | ICD-10-CM

## 2024-09-26 DIAGNOSIS — C67.8 MALIGNANT NEOPLASM OF OVERLAPPING SITES OF BLADDER: ICD-10-CM

## 2024-09-26 DIAGNOSIS — I25.10 CORONARY ARTERY DISEASE DUE TO LIPID RICH PLAQUE: ICD-10-CM

## 2024-09-26 DIAGNOSIS — I73.9 PERIPHERAL VASCULAR DISEASE, UNSPECIFIED: ICD-10-CM

## 2024-09-26 LAB
BILIRUB UR QL STRIP: NEGATIVE
CLARITY UR: CLEAR
COLOR UR: YELLOW
GLUCOSE UR QL STRIP: NEGATIVE
HGB UR QL STRIP: NEGATIVE
KETONES UR QL STRIP: NEGATIVE
LEUKOCYTE ESTERASE UR QL STRIP: NEGATIVE
NITRITE UR QL STRIP: NEGATIVE
PH UR STRIP: 6 [PH] (ref 5–8)
PROT UR QL STRIP: NEGATIVE
SP GR UR STRIP: 1.01 (ref 1–1.03)
URN SPEC COLLECT METH UR: NORMAL

## 2024-09-26 PROCEDURE — 63600175 PHARM REV CODE 636 W HCPCS: Mod: HCNC,PN | Performed by: STUDENT IN AN ORGANIZED HEALTH CARE EDUCATION/TRAINING PROGRAM

## 2024-09-26 PROCEDURE — 81003 URINALYSIS AUTO W/O SCOPE: CPT | Mod: HCNC,PN | Performed by: STUDENT IN AN ORGANIZED HEALTH CARE EDUCATION/TRAINING PROGRAM

## 2024-09-26 PROCEDURE — 99999 PR PBB SHADOW E&M-EST. PATIENT-LVL III: CPT | Mod: PBBFAC,HCNC,, | Performed by: STUDENT IN AN ORGANIZED HEALTH CARE EDUCATION/TRAINING PROGRAM

## 2024-09-26 PROCEDURE — 25000003 PHARM REV CODE 250: Mod: HCNC,PN | Performed by: STUDENT IN AN ORGANIZED HEALTH CARE EDUCATION/TRAINING PROGRAM

## 2024-09-26 PROCEDURE — 51720 TREATMENT OF BLADDER LESION: CPT | Mod: HCNC,PN

## 2024-09-26 RX ORDER — LIDOCAINE HYDROCHLORIDE 20 MG/ML
JELLY TOPICAL ONCE AS NEEDED
Status: COMPLETED | OUTPATIENT
Start: 2024-09-26 | End: 2024-09-26

## 2024-09-26 RX ORDER — LIDOCAINE HYDROCHLORIDE 20 MG/ML
JELLY TOPICAL ONCE AS NEEDED
Start: 2024-10-03

## 2024-09-26 RX ORDER — LIDOCAINE HYDROCHLORIDE 20 MG/ML
JELLY TOPICAL ONCE AS NEEDED
Status: CANCELLED
Start: 2024-10-03

## 2024-09-26 RX ORDER — LIDOCAINE HYDROCHLORIDE 20 MG/ML
JELLY TOPICAL ONCE AS NEEDED
Status: DISCONTINUED | OUTPATIENT
Start: 2024-09-26 | End: 2024-09-26 | Stop reason: HOSPADM

## 2024-09-26 RX ADMIN — LIDOCAINE HYDROCHLORIDE 2 ML: 20 JELLY TOPICAL at 03:09

## 2024-09-26 RX ADMIN — SODIUM CHLORIDE 25 MG: 9 INJECTION, SOLUTION INTRAVENOUS at 03:09

## 2024-09-26 NOTE — PROGRESS NOTES
Subjective:       Patient ID: Nate aBh is a 79 y.o. male who presents for new patient evaluation for chronic renal failure.    Nate Bah is referred by Nate Parekh MD to be evaluated for chronic renal failure.  He is pertinent ongoing medical conditions of hyperuricemia with gout history, GERD, mixed hyperlipidemia, hypertension dx nearly 50 years ago, dm type II without long term use of insulin dx 5 yr ago, paroxysmal atrial fibrillation, BPH, coronary artery disease status post stents, status post Watchman, and recent hospitalization with diagnosis of bladder cancer.  He has started BCG instillations in the interim with Urology followed by Dr. Julian.  Referred to Nephrology Clinic for evaluation into chronic kidney disease.  We reviewed his most recent metabolic panel from 09/06/2024 featuring a serum creatinine of 1.5 mg/dL, which appears consistent with his baseline of 1.2-1.5 over the last 2 years.  A contemporary urinalysis from the same day was bland.    In terms of his renal history, he denies hospitalizations for prior JACKSON requiring RRT. Denies history of nephrolithiasis episodes. No reported history of frequent or recurrent use of NSAIDs/COXI. No pertinent rheumatologic or AI disease history. Denies any pertinent family history of known kidney disease, or family members diagnosed with ESRD requiring dialysis.  Smoking Hx: quit almost 50 years ago  Other pertinent urologic history: see above  Fluid intake per 24hr: roughly 100oz of water.     He has no uremic or urinary symptoms and is in his usual state of health.      During this visit, the patient and I reviewed his lab trends, discussed CKD epidemiology and risk factors, as well as general lifestyle and risk factor modifications to reduce his risk of progression to ESRD.         Review of Systems   Constitutional:  Negative for chills, diaphoresis and fever.   Respiratory:  Positive for shortness of breath (on exertion, at baseline).  Negative for cough.    Cardiovascular:  Negative for chest pain and leg swelling.   Gastrointestinal:  Negative for abdominal pain, diarrhea, nausea and vomiting.   Genitourinary:  Negative for difficulty urinating, dysuria and hematuria.   Musculoskeletal:  Negative for myalgias.   Neurological:  Negative for headaches.   Hematological:  Does not bruise/bleed easily.       The past medical, family and social histories were reviewed for this encounter.     Past Medical History:   Diagnosis Date    JACKSON (acute kidney injury) 12/04/2016    Anticoagulant long-term use     BPH (benign prostatic hyperplasia)     Cancer     skin cancer to arms    Coronary artery disease     afib, stents, pacemaker watchman    Diabetes mellitus     Edema     Esophagus disorder     esophagus spasms    GERD without esophagitis     Gout     Hypertension     Mixed hyperlipidemia     Obesity (BMI 30-39.9)     Paroxysmal atrial fibrillation     Salmonella gastroenteritis 01/17/2023    Sleep apnea     no Cpap     Past Surgical History:   Procedure Laterality Date    ANGIOGRAM, CORONARY, WITH LEFT HEART CATHETERIZATION Left 05/03/2021    Procedure: Angiogram, Coronary, with Left Heart Cath;  Surgeon: Flakito Winter MD;  Location: [facility] CATH;  Service: Cardiology;  Laterality: Left;    BLADDER FULGURATION N/A 7/31/2024    Procedure: FULGURATION, BLADDER;  Surgeon: Lazaro Julian MD;  Location: [facility] OR;  Service: Urology;  Laterality: N/A;    CARDIAC SURGERY  2003    CABG 1 vessel    CHOLECYSTECTOMY      CLOSURE OF LEFT ATRIAL APPENDAGE USING DEVICE  02/28/2024    Procedure: Watchman;  Surgeon: Fritz Del Valle MD;  Location: [facility] CATH;  Service: Cardiology;;    COLON SURGERY  2007?    resection /perforated colon    CORONARY ANGIOGRAPHY N/A 07/24/2018    Procedure: Coronary angiography;  Surgeon: Italo Mckeon MD;  Location: [facility] CATH;  Service: Cardiology;  Laterality: N/A;    CORONARY ANGIOPLASTY WITH STENT PLACEMENT      5 stents, last one 2015     CORONARY STENT PLACEMENT N/A 07/24/2018    Procedure: INSERTION, STENT, CORONARY ARTERY;  Surgeon: Italo Mckeon MD;  Location: [facility] CATH;  Service: Cardiology;  Laterality: N/A;    CYSTOSCOPY N/A 7/31/2024    Procedure: CYSTOSCOPY;  Surgeon: Lazaro Julian MD;  Location: [facility] OR;  Service: Urology;  Laterality: N/A;    CYSTOSCOPY W/ RETROGRADES Right 7/10/2024    Procedure: CYSTOSCOPY, WITH RETROGRADE PYELOGRAM;  Surgeon: Lazaro Julian MD;  Location: [facility] OR;  Service: Urology;  Laterality: Right;    ECHOCARDIOGRAM,TRANSESOPHAGEAL  02/28/2024    Procedure: (TESFAYE) intra-procedure;  Surgeon: Adalgisa Goodson MD;  Location: [facility] CATH;  Service: Cardiology;;    ECHOCARDIOGRAM,TRANSESOPHAGEAL N/A 04/01/2024    Procedure: Transesophageal echo (TESFAYE) intra-procedure log documentation;  Surgeon: Flakito Winter MD;  Location: [facility];  Service: Cardiology;  Laterality: N/A;  6 week post-implant TESFAYE for Watchman    EYE SURGERY      cataracts    FOOT SURGERY Right     with hardware    HEMORRHOID SURGERY      INCISION OF PERIRECTAL ABSCESS N/A 03/25/2020    Procedure: INCISION, ABSCESS, PERIRECTAL;  Surgeon: Nayan Mack MD;  Location: [facility] OR;  Service: General;  Laterality: N/A;    INSERTION OF PACEMAKER Left 05/04/2021    Procedure: INSERTION, PACEMAKER;  Surgeon: Flakito Winter MD;  Location: [facility] CATH;  Service: Cardiology;  Laterality: Left;    INSTILLATION OF URINARY BLADDER N/A 7/10/2024    Procedure: INSTILLATION, BLADDER;  Surgeon: Lazaro Julian MD;  Location: [facility] OR;  Service: Urology;  Laterality: N/A;  Gemcitabine    JOINT REPLACEMENT Left     knee    KNEE ARTHROSCOPY W/ MENISCAL REPAIR Right     KYPHOSIS SURGERY      LEFT HEART CATHETERIZATION N/A 07/24/2018    Procedure: Left heart cath;  Surgeon: Italo Mckeon MD;  Location: [facility] CATH;  Service: Cardiology;  Laterality: N/A;    TRANSURETHRAL RESECTION OF BLADDER TUMOR BY BIPOLAR CAUTERY N/A 7/10/2024    Procedure: TURBT, USING BIPOLAR CAUTERY;   Surgeon: Lazaro Julian MD;  Location: [facility];  Service: Urology;  Laterality: N/A;     Social History     Socioeconomic History    Marital status:    Tobacco Use    Smoking status: Former     Current packs/day: 0.00     Average packs/day: 1 pack/day for 25.0 years (25.0 ttl pk-yrs)     Types: Cigarettes     Start date:      Quit date: 2010     Years since quittin.7    Smokeless tobacco: Never   Substance and Sexual Activity    Alcohol use: Not Currently     Comment: occasional    Drug use: No    Sexual activity: Yes     Partners: Female     Social Determinants of Health     Financial Resource Strain: Low Risk  (2024)    Overall Financial Resource Strain (CARDIA)     Difficulty of Paying Living Expenses: Not hard at all   Food Insecurity: No Food Insecurity (2024)    Hunger Vital Sign     Worried About Running Out of Food in the Last Year: Never true     Ran Out of Food in the Last Year: Never true   Transportation Needs: No Transportation Needs (2024)    TRANSPORTATION NEEDS     Transportation : No   Physical Activity: Unknown (2024)    Exercise Vital Sign     Days of Exercise per Week: 0 days   Stress: No Stress Concern Present (2024)    [employer] - Occupational Stress Questionnaire     Feeling of Stress : Not at all   Housing Stability: Low Risk  (2024)    Housing Stability Vital Sign     Unable to Pay for Housing in the Last Year: No     Homeless in the Last Year: No     Current Outpatient Medications   Medication Sig    allopurinoL (ZYLOPRIM) 300 MG tablet TAKE 1 TABLET EVERY DAY    ascorbic acid (VITAMIN C ORAL) Take 1 tablet by mouth once daily.    cholecalciferol, vitamin D3, (VITAMIN D3) 10 mcg (400 unit) Tab Take 400 Units by mouth once daily.    docusate sodium (COLACE) 100 MG capsule Take 100 mg by mouth 2 (two) times daily.    furosemide (LASIX) 40 MG tablet Take 1 tablet (40 mg total) by mouth once daily.    glipiZIDE  "(GLUCOTROL) 5 MG tablet TAKE 1 TABLET ONE TIME DAILY 30 MINUTES BEFORE A MEAL (Patient taking differently: Take 5 mg by mouth before breakfast.)    isosorbide mononitrate (IMDUR) 60 MG 24 hr tablet TAKE 1 TABLET EVERY EVENING    ketoconazole (NIZORAL) 2 % cream Apply 1 application  topically daily as needed (to fungal rash on both legs).    LIDOcaine viscous HCl 2% (LIDOCAINE VISCOUS) 2 % Soln by Mucous Membrane route every 6 (six) hours.    losartan (COZAAR) 100 MG tablet TAKE 1 TABLET EVERY DAY (Patient taking differently: Take 100 mg by mouth once daily.)    magnesium oxide (MAG-OX) 400 mg (241.3 mg magnesium) tablet Take 400 mg by mouth every evening.    oxyCODONE-acetaminophen (PERCOCET) 5-325 mg per tablet Take 1 tablet by mouth every 6 (six) hours as needed for Pain.    oxyCODONE-acetaminophen (PERCOCET) 5-325 mg per tablet Take 1 tablet by mouth every 6 (six) hours as needed for Pain.    pantoprazole (PROTONIX) 40 MG tablet Take 1 tablet (40 mg total) by mouth once daily.    rosuvastatin (CRESTOR) 10 MG tablet Take 1 tablet (10 mg total) by mouth every evening.    tamsulosin (FLOMAX) 0.4 mg Cap Take 1 capsule (0.4 mg total) by mouth every evening.     No current facility-administered medications for this visit.         Objective:   /60 (BP Location: Right arm, Patient Position: Sitting, BP Method: Large (Manual))   Pulse 72   Ht 6' 2.4" (1.89 m)   SpO2 97%   BMI 35.65 kg/m²      Physical Exam  Vitals reviewed.   Constitutional:       General: He is not in acute distress.     Appearance: He is obese.   HENT:      Head: Normocephalic and atraumatic.   Eyes:      General: No scleral icterus.     Extraocular Movements: Extraocular movements intact.   Cardiovascular:      Rate and Rhythm: Normal rate and regular rhythm.      Pulses: Normal pulses.      Heart sounds: Normal heart sounds.      No friction rub.   Pulmonary:      Effort: Pulmonary effort is normal. No respiratory distress.      Breath " sounds: Normal breath sounds.   Abdominal:      General: Abdomen is protuberant.      Palpations: Abdomen is soft.   Musculoskeletal:         General: No swelling.      Cervical back: Normal range of motion. No tenderness.      Right lower leg: No edema.      Left lower leg: No edema.   Neurological:      General: No focal deficit present.      Mental Status: He is oriented to person, place, and time.      Motor: No weakness.   Psychiatric:         Mood and Affect: Mood normal.         Behavior: Behavior normal.         Assessment:     Lab Results   Component Value Date    CREATININE 1.49 (H) 09/06/2024    BUN 26 (H) 09/06/2024     (L) 09/06/2024    K 4.5 09/06/2024    CL 99 09/06/2024    CO2 29 09/06/2024     Lab Results   Component Value Date    CALCIUM 9.2 09/06/2024    PHOS 2.9 01/23/2023     Lab Results   Component Value Date    HCT 42.7 09/06/2024     Prot/Creat Ratio, Urine   Date Value Ref Range Status   01/20/2023 215.1 (H) 15.0 - 68.0 mg/g Final       Lab Results   Component Value Date    MICALBCREAT Unable to calculate 03/21/2024    MICALBCREAT 11.2 01/20/2023    MICALBCREAT Unable to calculate 09/14/2022    MICALBCREAT Unable to calculate 03/03/2020    MICALBCREAT Unable to calculate 06/04/2019    MICALBCREAT 12.8 03/07/2019           1. Diabetic polyneuropathy associated with type 2 diabetes mellitus    2. Coronary artery disease + Ectasia, h/o CABG & stentsCABG x 1, LIMA to LAD 8/2003 D[place], Stent RCA 2002, stent LCx 7/09, stent ostial LAD, LCx 11/11, 7/18 stent prox LAD 4.0 x 28 Synergy    3. Essential hypertension    4. Mixed hyperlipidemia    5. Peripheral vascular disease, unspecified    6. Stage 3a chronic kidney disease    7. Malignant neoplasm of overlapping sites of bladder    8. Class 2 severe obesity due to excess calories with serious comorbidity and body mass index (BMI) of 36.0 to 36.9 in adult        Plan:   Return to clinic in 3 months  Labs for next visit include rfp, uacr,  upcr, ua, pth, cbc.  Baseline creatinine is 1.2-1.5mg/dL since 2022.    CKD stage GIIIa/ A1  -risk:  Age, longstanding multiyear hypertension, recent diabetes though controlled, cardiovascular disease, prostate/bladder disease  -reviewed recent imaging, previous right-sided mild hydronephrosis has improved.  Most recent imaging showed elevated RI of the left kidney.  -however, serum creatinine trend over the last 2 years remains stable, he is at baseline.  See above.  -on Max RASI for CKD-MACE risk reduction, proteinuria reduction and reynold-protection  -low risk of progression based on KFR model: 0.21% risk at 5 years  -continue observation with supportive care.  Were low albuminuria no need to escalate therapy at this time.    Coronary artery disease/paroxysmal atrial fibrillation-per Cardiology     Hypertension-blood pressure trends reviewed.  Medication list reviewed.  Continue current medications at this time.      Mixed hyperlipidemia-remains on rosuvastatin-discussed Mediterranean diet with the patient today.    Severe obesity elevated BMI-discuss diet and exercise.  He is actively losing weight currently with intent to lose more weight.  We discussed obtaining a pedometer in targeting 6000 steps a day and working up from there.    BPH-on Flomax.  Per Urology.    Neoplasm of bladder-per urology.  BCG therapy.    MBD evaluation-continue vitamin-D for now, follow up phos and PTH for next visit.    Raymundo Coffey MD  [facility]    Part of this note has been created using American Board of Addiction Medicine (ABAM) dictation system. Errors in transcription may not be completely avoided.

## 2024-09-26 NOTE — PLAN OF CARE
Problem: Adult Inpatient Plan of Care  Goal: Patient-Specific Goal (Individualized)  Outcome: Progressing  Flowsheets (Taken 9/26/2024 1558)  Individualized Care Needs: Private room  Anxieties, Fears or Concerns: None     Problem: Fatigue  Goal: Improved Activity Tolerance  Intervention: Promote Improved Energy  Flowsheets (Taken 9/26/2024 1558)  Fatigue Management:   activity schedule adjusted   paced activity encouraged  Sleep/Rest Enhancement: relaxation techniques promoted  Activity Management:   Ambulated -L4   Ambulated in [place] - L4  Environmental Support:   calm environment promoted   environmental consistency promoted     Problem: Adult Inpatient Plan of Care  Goal: Plan of Care Review  Outcome: Progressing  Flowsheets (Taken 9/26/2024 1558)  Plan of Care Reviewed With: patient  Tolerated treatment with no known distress.  Ambulated from infusion center with steady gait.

## 2024-10-03 ENCOUNTER — INFUSION (OUTPATIENT)
Dept: INFUSION THERAPY | Facility: HOSPITAL | Age: 79
End: 2024-10-03
Attending: STUDENT IN AN ORGANIZED HEALTH CARE EDUCATION/TRAINING PROGRAM
Payer: MEDICARE

## 2024-10-03 VITALS
DIASTOLIC BLOOD PRESSURE: 72 MMHG | TEMPERATURE: 98 F | OXYGEN SATURATION: 99 % | WEIGHT: 282.63 LBS | HEART RATE: 72 BPM | RESPIRATION RATE: 18 BRPM | HEIGHT: 74 IN | BODY MASS INDEX: 36.27 KG/M2 | SYSTOLIC BLOOD PRESSURE: 165 MMHG

## 2024-10-03 DIAGNOSIS — C67.8 MALIGNANT NEOPLASM OF OVERLAPPING SITES OF BLADDER: Primary | ICD-10-CM

## 2024-10-03 PROCEDURE — 51720 TREATMENT OF BLADDER LESION: CPT | Mod: HCNC,PN

## 2024-10-03 PROCEDURE — 81003 URINALYSIS AUTO W/O SCOPE: CPT | Mod: HCNC,PN | Performed by: STUDENT IN AN ORGANIZED HEALTH CARE EDUCATION/TRAINING PROGRAM

## 2024-10-03 PROCEDURE — 25000003 PHARM REV CODE 250: Mod: HCNC,PN | Performed by: STUDENT IN AN ORGANIZED HEALTH CARE EDUCATION/TRAINING PROGRAM

## 2024-10-03 PROCEDURE — 63600175 PHARM REV CODE 636 W HCPCS: Mod: HCNC,PN | Performed by: STUDENT IN AN ORGANIZED HEALTH CARE EDUCATION/TRAINING PROGRAM

## 2024-10-03 RX ORDER — LIDOCAINE HYDROCHLORIDE 20 MG/ML
JELLY TOPICAL ONCE AS NEEDED
Start: 2024-10-10

## 2024-10-03 RX ORDER — LIDOCAINE HYDROCHLORIDE 20 MG/ML
JELLY TOPICAL ONCE AS NEEDED
Status: COMPLETED | OUTPATIENT
Start: 2024-10-03 | End: 2024-10-03

## 2024-10-03 RX ADMIN — SODIUM CHLORIDE 25 MG: 9 INJECTION, SOLUTION INTRAVENOUS at 03:10

## 2024-10-03 RX ADMIN — LIDOCAINE HYDROCHLORIDE 5 ML: 20 JELLY TOPICAL at 03:10

## 2024-10-03 NOTE — PLAN OF CARE
Problem: Adult Inpatient Plan of Care  Goal: Patient-Specific Goal (Individualized)  Outcome: Progressing  Flowsheets (Taken 10/3/2024 1558)  Individualized Care Needs: Private room  Anxieties, Fears or Concerns: None     Problem: Fatigue  Goal: Improved Activity Tolerance  Intervention: Promote Improved Energy  Flowsheets (Taken 10/3/2024 1558)  Fatigue Management:   paced activity encouraged   activity schedule adjusted  Sleep/Rest Enhancement: relaxation techniques promoted  Activity Management:   Ambulated -L4   Ambulated in [place] - L4  Environmental Support:   calm environment promoted   environmental consistency promoted     Problem: Adult Inpatient Plan of Care  Goal: Plan of Care Review  Outcome: Progressing  Flowsheets (Taken 10/3/2024 1558)  Plan of Care Reviewed With: patient  Tolerated treatment with no known distress.  Ambulated from infusion center with steady gait.

## 2024-10-10 ENCOUNTER — INFUSION (OUTPATIENT)
Dept: INFUSION THERAPY | Facility: HOSPITAL | Age: 79
End: 2024-10-10
Attending: STUDENT IN AN ORGANIZED HEALTH CARE EDUCATION/TRAINING PROGRAM
Payer: MEDICARE

## 2024-10-10 VITALS
TEMPERATURE: 98 F | WEIGHT: 286.38 LBS | OXYGEN SATURATION: 99 % | DIASTOLIC BLOOD PRESSURE: 74 MMHG | RESPIRATION RATE: 18 BRPM | BODY MASS INDEX: 36.75 KG/M2 | HEART RATE: 76 BPM | SYSTOLIC BLOOD PRESSURE: 176 MMHG | HEIGHT: 74 IN

## 2024-10-10 DIAGNOSIS — C67.8 MALIGNANT NEOPLASM OF OVERLAPPING SITES OF BLADDER: Primary | ICD-10-CM

## 2024-10-10 PROCEDURE — 51720 TREATMENT OF BLADDER LESION: CPT | Mod: HCNC,PN

## 2024-10-10 PROCEDURE — 63600175 PHARM REV CODE 636 W HCPCS: Mod: HCNC,PN | Performed by: STUDENT IN AN ORGANIZED HEALTH CARE EDUCATION/TRAINING PROGRAM

## 2024-10-10 PROCEDURE — 81003 URINALYSIS AUTO W/O SCOPE: CPT | Mod: HCNC,PN | Performed by: STUDENT IN AN ORGANIZED HEALTH CARE EDUCATION/TRAINING PROGRAM

## 2024-10-10 PROCEDURE — 25000003 PHARM REV CODE 250: Mod: HCNC,PN | Performed by: STUDENT IN AN ORGANIZED HEALTH CARE EDUCATION/TRAINING PROGRAM

## 2024-10-10 RX ORDER — LIDOCAINE HYDROCHLORIDE 20 MG/ML
JELLY TOPICAL ONCE AS NEEDED
Status: COMPLETED | OUTPATIENT
Start: 2024-10-10 | End: 2024-10-10

## 2024-10-10 RX ORDER — LIDOCAINE HYDROCHLORIDE 20 MG/ML
JELLY TOPICAL ONCE AS NEEDED
Start: 2024-10-17

## 2024-10-10 RX ADMIN — SODIUM CHLORIDE 25 MG: 9 INJECTION, SOLUTION INTRAVENOUS at 03:10

## 2024-10-10 RX ADMIN — LIDOCAINE HYDROCHLORIDE 2 ML: 20 JELLY TOPICAL at 03:10

## 2024-10-10 NOTE — PLAN OF CARE
Problem: Adult Inpatient Plan of Care  Goal: Patient-Specific Goal (Individualized)  Outcome: Progressing  Flowsheets (Taken 10/10/2024 1552)  Individualized Care Needs: Private room  Anxieties, Fears or Concerns: None     Problem: Fatigue  Goal: Improved Activity Tolerance  Intervention: Promote Improved Energy  Flowsheets (Taken 10/10/2024 1552)  Fatigue Management:   paced activity encouraged   activity schedule adjusted  Sleep/Rest Enhancement: relaxation techniques promoted  Activity Management:   Ambulated -L4   Ambulated in [place] - L4  Environmental Support:   calm environment promoted   environmental consistency promoted     Problem: Adult Inpatient Plan of Care  Goal: Plan of Care Review  Outcome: Progressing  Flowsheets (Taken 10/10/2024 1552)  Plan of Care Reviewed With: patient  Tolerated treatment with no known distress.  Ambulated from infusion center with steady gait.

## 2024-10-11 ENCOUNTER — PROCEDURE VISIT (OUTPATIENT)
Dept: UROLOGY | Facility: CLINIC | Age: 79
End: 2024-10-11
Payer: MEDICARE

## 2024-10-11 ENCOUNTER — PATIENT MESSAGE (OUTPATIENT)
Dept: UROLOGY | Facility: CLINIC | Age: 79
End: 2024-10-11

## 2024-10-11 VITALS — HEIGHT: 74 IN | WEIGHT: 282.19 LBS | BODY MASS INDEX: 36.22 KG/M2

## 2024-10-11 DIAGNOSIS — C67.8 MALIGNANT NEOPLASM OF OVERLAPPING SITES OF BLADDER: Primary | ICD-10-CM

## 2024-10-11 LAB
BILIRUBIN, UA POC OHS: NEGATIVE
BLOOD, UA POC OHS: ABNORMAL
CLARITY, UA POC OHS: CLEAR
COLOR, UA POC OHS: YELLOW
GLUCOSE, UA POC OHS: NEGATIVE
KETONES, UA POC OHS: NEGATIVE
LEUKOCYTES, UA POC OHS: ABNORMAL
NITRITE, UA POC OHS: NEGATIVE
PH, UA POC OHS: 7
PROTEIN, UA POC OHS: NEGATIVE
SPECIFIC GRAVITY, UA POC OHS: 1.01
UROBILINOGEN, UA POC OHS: 1

## 2024-10-11 RX ORDER — CIPROFLOXACIN 500 MG/1
500 TABLET ORAL ONCE AS NEEDED
Qty: 1 TABLET | Refills: 0 | Status: SHIPPED | OUTPATIENT
Start: 2024-10-11

## 2024-10-11 NOTE — PROCEDURES
Cystoscopy    Date/Time: 10/11/2024 10:00 AM    Performed by: Lazaro Julian MD  Authorized by: Lazaro Julian MD      Procedure:   Flexible cysto-urethroscopy    Pre Procedure Diagnosis:  personal history of bladder cancer    Post Procedure Diagnosis:  Same     Surgeon: Lazaro Julian MD     Anesthesia: 2% uro-jet lidocaine jelly for local analgesia    Procedure note:  The risks and benefits were explained and informed consent was obtained. 2% lidocaine urojet was used for local analgesia. The genitalia was prepped and draped in the sterile fashion.    The flexible scope was advanced into the urethra and into the bladder. There was narrowing at the fossa navicularis which was able to accommodate the scope with gentle pressure. No other strictures seen.     The bladder was completely surveyed in a systematic fashion.  The orifice was seen in normal anatomic position.  The right UO had previously been resected and was difficult to visualize.  An area thought to be the right UO was pinpoint in appearance but seen to efflux.  There were no foreign bodies or stones. There were 1+ trabeculations and no diverticula. No bladder tumors or lesions suspicious for malignancy were seen.     Upon retroflexion there was no significant median lobe enlargement. As the flexible cystoscope was being withdrawn, the bladder neck and prostate were evaluated. The lateral lobes of the prostate were mildly enlarged. The estimated prostatic urethral length was 3 cm.     The patient tolerated the procedure well without complication.     Findings in summary:  No evidence of recurrence  Pinpoint right UO    Assessment: previous US showed no evidence of hydronephrosis     Plan:  Cytology  Cystoscopy 3 months  Renal US 3 months

## 2024-11-04 DIAGNOSIS — E11.9 TYPE 2 DIABETES MELLITUS WITHOUT COMPLICATION, WITHOUT LONG-TERM CURRENT USE OF INSULIN: Primary | ICD-10-CM

## 2024-11-05 RX ORDER — GLIPIZIDE 5 MG/1
5 TABLET ORAL
Qty: 90 TABLET | Refills: 3 | Status: SHIPPED | OUTPATIENT
Start: 2024-11-05 | End: 2025-11-05

## 2024-11-05 NOTE — TELEPHONE ENCOUNTER
No care due was identified.  [facility] Embedded Care Due Messages. Reference number: [id number].   11/04/2024 6:35:32 PM CST

## 2024-11-05 NOTE — TELEPHONE ENCOUNTER
Refill Routing Note   Medication(s) are not appropriate for processing by [facility] for the following reason(s):        Required labs abnormal    [facility] action(s):  Defer               Appointments  past 12m or future 3m with PCP    Date Provider   Last Visit   9/24/2024 Nate Parekh MD   Next Visit   4/1/2025 Nate Parekh MD   ED visits in past 90 days: 1        Note composed:4:02 AM 11/05/2024

## 2024-11-11 PROBLEM — T82.110A PACEMAKER LEAD MALFUNCTION: Status: ACTIVE | Noted: 2024-11-11

## 2024-12-02 ENCOUNTER — OFFICE VISIT (OUTPATIENT)
Dept: FAMILY MEDICINE | Facility: CLINIC | Age: 79
End: 2024-12-02
Payer: MEDICARE

## 2024-12-02 ENCOUNTER — HOSPITAL ENCOUNTER (OUTPATIENT)
Dept: RADIOLOGY | Facility: HOSPITAL | Age: 79
Discharge: HOME | End: 2024-12-02
Attending: PHYSICIAN ASSISTANT
Payer: MEDICARE

## 2024-12-02 DIAGNOSIS — R10.32 LEFT LOWER QUADRANT PAIN: ICD-10-CM

## 2024-12-02 DIAGNOSIS — R07.81 RIB PAIN ON RIGHT SIDE: ICD-10-CM

## 2024-12-02 DIAGNOSIS — R10.32 LEFT LOWER QUADRANT PAIN: Primary | ICD-10-CM

## 2024-12-02 DIAGNOSIS — Z95.818 PRESENCE OF WATCHMAN LEFT ATRIAL APPENDAGE CLOSURE DEVICE: ICD-10-CM

## 2024-12-02 DIAGNOSIS — I10 ESSENTIAL HYPERTENSION: ICD-10-CM

## 2024-12-02 DIAGNOSIS — B35.3 TINEA PEDIS OF RIGHT FOOT: ICD-10-CM

## 2024-12-02 DIAGNOSIS — R93.5 ABNORMAL FINDINGS ON DIAGNOSTIC IMAGING OF OTHER ABDOMINAL REGIONS, INCLUDING RETROPERITONEUM: ICD-10-CM

## 2024-12-02 PROCEDURE — 1125F AMNT PAIN NOTED PAIN PRSNT: CPT | Mod: HCNC,CPTII,S$GLB, | Performed by: PHYSICIAN ASSISTANT

## 2024-12-02 PROCEDURE — 74176 CT ABD & PELVIS W/O CONTRAST: CPT | Mod: TC,HCNC,PO

## 2024-12-02 PROCEDURE — 74176 CT ABD & PELVIS W/O CONTRAST: CPT | Mod: 26,HCNC,, | Performed by: RADIOLOGY

## 2024-12-02 PROCEDURE — 99215 OFFICE O/P EST HI 40 MIN: CPT | Mod: HCNC,S$GLB,, | Performed by: PHYSICIAN ASSISTANT

## 2024-12-02 PROCEDURE — 3288F FALL RISK ASSESSMENT DOCD: CPT | Mod: HCNC,CPTII,S$GLB, | Performed by: PHYSICIAN ASSISTANT

## 2024-12-02 PROCEDURE — 3074F SYST BP LT 130 MM HG: CPT | Mod: HCNC,CPTII,S$GLB, | Performed by: PHYSICIAN ASSISTANT

## 2024-12-02 PROCEDURE — 2023F DILAT RTA XM W/O RTNOPTHY: CPT | Mod: HCNC,CPTII,S$GLB, | Performed by: PHYSICIAN ASSISTANT

## 2024-12-02 PROCEDURE — 99999 PR PBB SHADOW E&M-EST. PATIENT-LVL IV: CPT | Mod: PBBFAC,HCNC,, | Performed by: PHYSICIAN ASSISTANT

## 2024-12-02 PROCEDURE — 3078F DIAST BP <80 MM HG: CPT | Mod: HCNC,CPTII,S$GLB, | Performed by: PHYSICIAN ASSISTANT

## 2024-12-02 PROCEDURE — 72070 X-RAY EXAM THORAC SPINE 2VWS: CPT | Mod: TC,HCNC,FY,PO

## 2024-12-02 PROCEDURE — 1159F MED LIST DOCD IN RCRD: CPT | Mod: HCNC,CPTII,S$GLB, | Performed by: PHYSICIAN ASSISTANT

## 2024-12-02 PROCEDURE — A9698 NON-RAD CONTRAST MATERIALNOC: HCPCS | Mod: HCNC,PO | Performed by: PHYSICIAN ASSISTANT

## 2024-12-02 PROCEDURE — 71101 X-RAY EXAM UNILAT RIBS/CHEST: CPT | Mod: TC,HCNC,FY,PO,RT

## 2024-12-02 PROCEDURE — 1100F PTFALLS ASSESS-DOCD GE2>/YR: CPT | Mod: HCNC,CPTII,S$GLB, | Performed by: PHYSICIAN ASSISTANT

## 2024-12-02 PROCEDURE — 72070 X-RAY EXAM THORAC SPINE 2VWS: CPT | Mod: 26,HCNC,, | Performed by: RADIOLOGY

## 2024-12-02 PROCEDURE — 71101 X-RAY EXAM UNILAT RIBS/CHEST: CPT | Mod: 26,HCNC,RT, | Performed by: RADIOLOGY

## 2024-12-02 PROCEDURE — 25500020 PHARM REV CODE 255: Mod: HCNC,PO | Performed by: PHYSICIAN ASSISTANT

## 2024-12-02 RX ORDER — LIDOCAINE 50 MG/G
1 PATCH TOPICAL DAILY
Qty: 30 PATCH | Refills: 0 | Status: SHIPPED | OUTPATIENT
Start: 2024-12-02

## 2024-12-02 RX ORDER — ECONAZOLE NITRATE 10 MG/G
CREAM TOPICAL DAILY
Qty: 85 G | Refills: 0 | Status: SHIPPED | OUTPATIENT
Start: 2024-12-02 | End: 2025-01-01

## 2024-12-02 RX ADMIN — BARIUM SULFATE 900 ML: 20 SUSPENSION ORAL at 02:12

## 2024-12-02 NOTE — PROGRESS NOTES
Subjective     Patient ID: Nate Bah is a 79 y.o. male.    Chief Complaint: Rib Injury (Rib pain since brake on 11/6)      HPI      Pt is new to me, PCP Dr. Parekh.     Pt is a 79 year old male with T2DM,  CAD with CABG hx, hx of watchman procedure for A-fib, pacemaker presence, BPH, CKD, hx of bladder ca, obesity, GERD, gout, hx of nicotine dependence. He presents today complaining of rib pain x 3 weeks. Pt states he fell on 11/6.   Went to [facility] urgent care directly after, and was told he did break a right rib. Was given percocet prn for pain, does not like to take it. Has been taking tylenol. Still in a lot of pain. Pt states that while pain is worse at right ribs, it radiates across back to left lower flank. Pt also states he started with bloating, diarrhea, lower abdominal discomfort over the weekend. Had 1 day of vomiting 2 days ago. No fever, chills. Has had belching and dyspepsia daily. Sees Dr. Olmstead. Has hx of colon resection and bowel perf (2007). No urinary symptoms    Review of Systems   Constitutional:  Positive for activity change. Negative for chills, fatigue, fever and unexpected weight change.   HENT:  Positive for hearing loss. Negative for rhinorrhea and trouble swallowing.    Eyes:  Negative for discharge and visual disturbance.   Respiratory:  Negative for chest tightness and wheezing.    Cardiovascular:  Negative for chest pain and palpitations.   Gastrointestinal:  Positive for abdominal pain and diarrhea. Negative for blood in stool, constipation and vomiting.   Endocrine: Negative for polydipsia and polyuria.   Genitourinary:  Negative for difficulty urinating, hematuria and urgency.   Musculoskeletal:  Negative for arthralgias, joint swelling and neck pain.   Neurological:  Negative for weakness and headaches.   Psychiatric/Behavioral:  Negative for confusion and dysphoric mood.           Objective     Physical Exam  Constitutional:       General: He is not in acute distress.      Appearance: Normal appearance. He is obese. He is not ill-appearing, toxic-appearing or diaphoretic.   HENT:      Head: Normocephalic and atraumatic.   Cardiovascular:      Heart sounds: Normal heart sounds. No murmur heard.     No friction rub. No gallop.   Pulmonary:      Effort: No respiratory distress.      Breath sounds: Normal breath sounds. No stridor. No wheezing, rhonchi or rales.   Chest:      Chest wall: No tenderness.   Abdominal:      General: Abdomen is flat. Bowel sounds are normal. There is distension.      Palpations: Abdomen is soft. There is no mass.      Tenderness: There is abdominal tenderness (left and right lower quadrant). There is no right CVA tenderness, left CVA tenderness, guarding or rebound.      Hernia: No hernia is present.      Comments: Scar and rectus diastasis of anterior lower abdomen, distention of right mid-lower abdomen   Musculoskeletal:         General: Tenderness (right lower ribs, left mid and lower paraspinals, bruise at left lower back, tender to touch) present.      Right lower leg: No edema.      Left lower leg: No edema.   Skin:     Comments: Right foot sole maceration, states topical therapy has worked in the past   Neurological:      General: No focal deficit present.      Mental Status: He is alert and oriented to person, place, and time. Mental status is at baseline.   Psychiatric:         Mood and Affect: Mood normal.         Behavior: Behavior normal.         Thought Content: Thought content normal.         Judgment: Judgment normal.       1. Left lower quadrant pain (Primary)  - CT Abdomen Pelvis  Without Contrast; Future  - CBC W/ AUTO DIFFERENTIAL; Future  - COMPREHENSIVE METABOLIC PANEL; Future  - Urinalysis  - CULTURE, URINE  -start mild, low fiber, liquid diet for the next 3-5 days    2. Rib pain on right side  -tylenol prn, discussed safe dosing, oxycodone from urgent care for severe pain, lidocaine patches prn  - XR Ribs Min 3 Views w/PA Chest Right;  Future  - X-Ray Thoracic Spine AP Lateral; Future  - LIDOcaine (LIDODERM) 5 %; Place 1 patch onto the skin once daily. Remove & Discard patch within 12 hours or as directed by MD  Dispense: 30 patch; Refill: 0    3. Tinea pedis of right foot  - econazole nitrate 1 % cream; Apply topically once daily.  Dispense: 85 g; Refill: 0  -keep cleana nd dry    4. Essential hypertension  -controlled on current med regimen, continue    5. Presence of Watchman left atrial appendage closure device - 27 mm FLX  -rate controlled, no longer anticoagulated    RTC/ER precautions given. F/U  45 minutes spent on patient encounter    Lavinia Reeves PA-C

## 2024-12-02 NOTE — PATIENT INSTRUCTIONS
A few reminders from today:    Start taking pantoprazole twice daily  Mild, as close to liquid, low fiber diet as possible, diet for the next 3-5 days  X rays today  CT scan today  Labs today  F/U with Dr. Olmstead  Heat to painful areas on ribs and back  Lidocaine patches as needed for pain  Tylenol as needed for pain  Percocet as needed for severe pain  Max acetaminophen dose 3000 mg daily  Urinalysis today    Follow up with me if needed.   Please go to ER/urgent care if after hours or symptoms persist/worsen.   Do not hesitate to get in touch with me should you have any further questions.     Thank you for trusting me with your care!  I wish you health and happiness.    -Lavinia Reeves PA-C

## 2024-12-03 ENCOUNTER — TELEPHONE (OUTPATIENT)
Dept: FAMILY MEDICINE | Facility: CLINIC | Age: 79
End: 2024-12-03
Payer: MEDICARE

## 2024-12-03 ENCOUNTER — PATIENT MESSAGE (OUTPATIENT)
Dept: UROLOGY | Facility: CLINIC | Age: 79
End: 2024-12-03
Payer: MEDICARE

## 2024-12-03 VITALS
OXYGEN SATURATION: 99 % | SYSTOLIC BLOOD PRESSURE: 128 MMHG | HEIGHT: 74 IN | DIASTOLIC BLOOD PRESSURE: 72 MMHG | WEIGHT: 285.81 LBS | BODY MASS INDEX: 36.68 KG/M2 | HEART RATE: 66 BPM | TEMPERATURE: 98 F

## 2024-12-03 DIAGNOSIS — R91.1 SOLITARY PULMONARY NODULE: ICD-10-CM

## 2024-12-03 DIAGNOSIS — N41.0 ACUTE PROSTATITIS: Primary | ICD-10-CM

## 2024-12-03 RX ORDER — CIPROFLOXACIN 500 MG/1
500 TABLET ORAL 2 TIMES DAILY
Qty: 20 TABLET | Refills: 0 | Status: SHIPPED | OUTPATIENT
Start: 2024-12-03 | End: 2024-12-13

## 2024-12-03 NOTE — TELEPHONE ENCOUNTER
Called with rest of results.       F/u with dr lencho collier  Schedule prostate mri  Dedicated chest CT at pt convenience  Due to abdominal disocmofrt, will trat for acute prostatitis with cipro

## 2024-12-11 ENCOUNTER — LAB VISIT (OUTPATIENT)
Dept: LAB | Facility: HOSPITAL | Age: 79
End: 2024-12-11
Attending: PHYSICIAN ASSISTANT
Payer: MEDICARE

## 2024-12-11 ENCOUNTER — OFFICE VISIT (OUTPATIENT)
Dept: FAMILY MEDICINE | Facility: CLINIC | Age: 79
End: 2024-12-11
Payer: MEDICARE

## 2024-12-11 VITALS
WEIGHT: 281.31 LBS | BODY MASS INDEX: 36.1 KG/M2 | TEMPERATURE: 98 F | DIASTOLIC BLOOD PRESSURE: 80 MMHG | HEIGHT: 74 IN | OXYGEN SATURATION: 99 % | HEART RATE: 68 BPM | SYSTOLIC BLOOD PRESSURE: 124 MMHG

## 2024-12-11 DIAGNOSIS — R10.31 RLQ ABDOMINAL PAIN: Primary | ICD-10-CM

## 2024-12-11 DIAGNOSIS — I71.21 ANEURYSM OF ASCENDING AORTA WITHOUT RUPTURE: ICD-10-CM

## 2024-12-11 DIAGNOSIS — N18.32 STAGE 3B CHRONIC KIDNEY DISEASE: ICD-10-CM

## 2024-12-11 DIAGNOSIS — R10.13 EPIGASTRIC PAIN: ICD-10-CM

## 2024-12-11 DIAGNOSIS — I71.9 AORTIC ANEURYSM WITHOUT RUPTURE, UNSPECIFIED PORTION OF AORTA: ICD-10-CM

## 2024-12-11 DIAGNOSIS — R10.31 RLQ ABDOMINAL PAIN: ICD-10-CM

## 2024-12-11 LAB
ALBUMIN SERPL BCP-MCNC: 3.8 G/DL (ref 3.5–5.2)
ALP SERPL-CCNC: 111 U/L (ref 40–150)
ALT SERPL W/O P-5'-P-CCNC: 16 U/L (ref 10–44)
ANION GAP SERPL CALC-SCNC: 10 MMOL/L (ref 8–16)
AST SERPL-CCNC: 15 U/L (ref 10–40)
BASOPHILS # BLD AUTO: 0.05 K/UL (ref 0–0.2)
BASOPHILS NFR BLD: 0.6 % (ref 0–1.9)
BILIRUB SERPL-MCNC: 1.8 MG/DL (ref 0.1–1)
BILIRUB UR QL STRIP: NEGATIVE
BUN SERPL-MCNC: 20 MG/DL (ref 8–23)
CALCIUM SERPL-MCNC: 10.3 MG/DL (ref 8.7–10.5)
CHLORIDE SERPL-SCNC: 103 MMOL/L (ref 95–110)
CLARITY UR: CLEAR
CO2 SERPL-SCNC: 29 MMOL/L (ref 23–29)
COLOR UR: YELLOW
CREAT SERPL-MCNC: 1.5 MG/DL (ref 0.5–1.4)
DIFFERENTIAL METHOD BLD: ABNORMAL
EOSINOPHIL # BLD AUTO: 0.2 K/UL (ref 0–0.5)
EOSINOPHIL NFR BLD: 2.4 % (ref 0–8)
ERYTHROCYTE [DISTWIDTH] IN BLOOD BY AUTOMATED COUNT: 14.4 % (ref 11.5–14.5)
EST. GFR  (NO RACE VARIABLE): 47 ML/MIN/1.73 M^2
GLUCOSE SERPL-MCNC: 132 MG/DL (ref 70–110)
GLUCOSE UR QL STRIP: NEGATIVE
HCT VFR BLD AUTO: 43.6 % (ref 40–54)
HGB BLD-MCNC: 14.6 G/DL (ref 14–18)
HGB UR QL STRIP: NEGATIVE
IMM GRANULOCYTES # BLD AUTO: 0.05 K/UL (ref 0–0.04)
IMM GRANULOCYTES NFR BLD AUTO: 0.6 % (ref 0–0.5)
KETONES UR QL STRIP: NEGATIVE
LEUKOCYTE ESTERASE UR QL STRIP: NEGATIVE
LYMPHOCYTES # BLD AUTO: 1.6 K/UL (ref 1–4.8)
LYMPHOCYTES NFR BLD: 20 % (ref 18–48)
MCH RBC QN AUTO: 29.3 PG (ref 27–31)
MCHC RBC AUTO-ENTMCNC: 33.5 G/DL (ref 32–36)
MCV RBC AUTO: 87 FL (ref 82–98)
MONOCYTES # BLD AUTO: 0.6 K/UL (ref 0.3–1)
MONOCYTES NFR BLD: 7.7 % (ref 4–15)
NEUTROPHILS # BLD AUTO: 5.6 K/UL (ref 1.8–7.7)
NEUTROPHILS NFR BLD: 68.7 % (ref 38–73)
NITRITE UR QL STRIP: NEGATIVE
NRBC BLD-RTO: 0 /100 WBC
PH UR STRIP: 6 [PH] (ref 5–8)
PLATELET # BLD AUTO: 182 K/UL (ref 150–450)
PMV BLD AUTO: 10 FL (ref 9.2–12.9)
POTASSIUM SERPL-SCNC: 4.7 MMOL/L (ref 3.5–5.1)
PROT SERPL-MCNC: 7.4 G/DL (ref 6–8.4)
PROT UR QL STRIP: NEGATIVE
RBC # BLD AUTO: 4.99 M/UL (ref 4.6–6.2)
SODIUM SERPL-SCNC: 142 MMOL/L (ref 136–145)
SP GR UR STRIP: 1.02 (ref 1–1.03)
URN SPEC COLLECT METH UR: NORMAL
WBC # BLD AUTO: 8.18 K/UL (ref 3.9–12.7)

## 2024-12-11 PROCEDURE — 80053 COMPREHEN METABOLIC PANEL: CPT | Mod: HCNC,PO | Performed by: PHYSICIAN ASSISTANT

## 2024-12-11 PROCEDURE — 2023F DILAT RTA XM W/O RTNOPTHY: CPT | Mod: HCNC,CPTII,S$GLB, | Performed by: PHYSICIAN ASSISTANT

## 2024-12-11 PROCEDURE — 99999 PR PBB SHADOW E&M-EST. PATIENT-LVL V: CPT | Mod: PBBFAC,HCNC,, | Performed by: PHYSICIAN ASSISTANT

## 2024-12-11 PROCEDURE — 87086 URINE CULTURE/COLONY COUNT: CPT | Mod: HCNC | Performed by: PHYSICIAN ASSISTANT

## 2024-12-11 PROCEDURE — 3074F SYST BP LT 130 MM HG: CPT | Mod: HCNC,CPTII,S$GLB, | Performed by: PHYSICIAN ASSISTANT

## 2024-12-11 PROCEDURE — 1160F RVW MEDS BY RX/DR IN RCRD: CPT | Mod: HCNC,CPTII,S$GLB, | Performed by: PHYSICIAN ASSISTANT

## 2024-12-11 PROCEDURE — 36415 COLL VENOUS BLD VENIPUNCTURE: CPT | Mod: HCNC,PO | Performed by: PHYSICIAN ASSISTANT

## 2024-12-11 PROCEDURE — 81003 URINALYSIS AUTO W/O SCOPE: CPT | Mod: HCNC,PO | Performed by: PHYSICIAN ASSISTANT

## 2024-12-11 PROCEDURE — 99214 OFFICE O/P EST MOD 30 MIN: CPT | Mod: HCNC,S$GLB,, | Performed by: PHYSICIAN ASSISTANT

## 2024-12-11 PROCEDURE — 1125F AMNT PAIN NOTED PAIN PRSNT: CPT | Mod: HCNC,CPTII,S$GLB, | Performed by: PHYSICIAN ASSISTANT

## 2024-12-11 PROCEDURE — 3079F DIAST BP 80-89 MM HG: CPT | Mod: HCNC,CPTII,S$GLB, | Performed by: PHYSICIAN ASSISTANT

## 2024-12-11 PROCEDURE — 3288F FALL RISK ASSESSMENT DOCD: CPT | Mod: HCNC,CPTII,S$GLB, | Performed by: PHYSICIAN ASSISTANT

## 2024-12-11 PROCEDURE — 1159F MED LIST DOCD IN RCRD: CPT | Mod: HCNC,CPTII,S$GLB, | Performed by: PHYSICIAN ASSISTANT

## 2024-12-11 PROCEDURE — 85025 COMPLETE CBC W/AUTO DIFF WBC: CPT | Mod: HCNC,PO | Performed by: PHYSICIAN ASSISTANT

## 2024-12-11 PROCEDURE — 1100F PTFALLS ASSESS-DOCD GE2>/YR: CPT | Mod: HCNC,CPTII,S$GLB, | Performed by: PHYSICIAN ASSISTANT

## 2024-12-11 RX ORDER — SUCRALFATE 1 G/10ML
1 SUSPENSION ORAL
Qty: 473 ML | Refills: 1 | Status: SHIPPED | OUTPATIENT
Start: 2024-12-11

## 2024-12-11 NOTE — PROGRESS NOTES
Subjective     Patient ID: Nate Bah is a 79 y.o. male.    Chief Complaint: Abdominal Pain      HPI      Pt is known to me, PCP Dr. Parekh.      Pt is a 79 year old male with T2DM,  CAD with CABG hx, hx of watchman procedure for A-fib, pacemaker presence, BPH, CKD, hx of bladder ca, obesity, GERD, gout, hx of nicotine dependence. He presents today for follow up regarding abdominal pain. Still with generalized abdominal pain, now worse in RLQ and epigastric area. No urinary symptoms. No fever. No further N/V/D. No hematochezia or melena.         Review of Systems   All other systems reviewed and are negative.         Objective     Physical Exam  Constitutional:       General: He is not in acute distress.     Appearance: Normal appearance. He is obese. He is not ill-appearing, toxic-appearing or diaphoretic.   HENT:      Head: Normocephalic and atraumatic.   Cardiovascular:      Heart sounds: Normal heart sounds. No murmur heard.     No friction rub. No gallop.   Pulmonary:      Effort: No respiratory distress.      Breath sounds: Normal breath sounds. No stridor. No wheezing, rhonchi or rales.   Chest:      Chest wall: No tenderness.   Abdominal:      General: Abdomen is flat. Bowel sounds are normal. There is distension.      Palpations: There is no mass.      Tenderness: There is abdominal tenderness (RLQ). There is no right CVA tenderness, left CVA tenderness, guarding or rebound.      Hernia: A hernia (ventral) is present.   Musculoskeletal:      Right lower leg: No edema.      Left lower leg: No edema.   Neurological:      General: No focal deficit present.      Mental Status: He is alert and oriented to person, place, and time. Mental status is at baseline.   Psychiatric:         Mood and Affect: Mood normal.         Behavior: Behavior normal.         Thought Content: Thought content normal.         Judgment: Judgment normal.       1. RLQ abdominal pain (Primary)  - CBC W/ AUTO DIFFERENTIAL; Future  -  COMPREHENSIVE METABOLIC PANEL; Future  - Urinalysis  - CULTURE, URINE  - CT Abdomen Pelvis With IV Contrast Routine Oral Contrast; Future  -last CT did not properly eval colon    2. Aortic aneurysm without rupture, unspecified portion of aorta  - US Abdominal Aorta; Future    3. Stage 3b chronic kidney disease  -continue to monitor    4. Epigastric pain  -continue ppi twice daily, add sucralfate, F/U with GI tomorrow as scheduled    5. AAA  -repeat u/s in 6 months    RTC/ER precautions given. F/U with me prn, has appt with GI tomorrow    Lavinia Reeves PA-C

## 2024-12-11 NOTE — PATIENT INSTRUCTIONS
A few reminders from today:    Continue pantoprazole twice daily  [phone] to schedule at outpatient [facility]  Labs and urine        Follow up with me if needed.   Please go to ER/urgent care if after hours or symptoms persist/worsen.   Do not hesitate to get in touch with me should you have any further questions.     Thank you for trusting me with your care!  I wish you health and happiness.    -Lavinia Reeves PA-C

## 2024-12-12 LAB — BACTERIA UR CULT: NO GROWTH

## 2024-12-21 ENCOUNTER — HOSPITAL ENCOUNTER (OUTPATIENT)
Dept: RADIOLOGY | Facility: HOSPITAL | Age: 79
Discharge: HOME | End: 2024-12-21
Attending: PHYSICIAN ASSISTANT
Payer: MEDICARE

## 2024-12-21 DIAGNOSIS — R91.1 SOLITARY PULMONARY NODULE: ICD-10-CM

## 2024-12-21 PROCEDURE — 71250 CT THORAX DX C-: CPT | Mod: 26,HCNC,, | Performed by: RADIOLOGY

## 2024-12-21 PROCEDURE — 71250 CT THORAX DX C-: CPT | Mod: TC,HCNC,PO

## 2024-12-30 ENCOUNTER — PATIENT MESSAGE (OUTPATIENT)
Dept: UROLOGY | Facility: CLINIC | Age: 79
End: 2024-12-30
Payer: MEDICARE

## 2024-12-30 DIAGNOSIS — R97.20 ABNORMAL PSA: Primary | ICD-10-CM

## 2025-01-03 ENCOUNTER — LAB VISIT (OUTPATIENT)
Dept: LAB | Facility: HOSPITAL | Age: 80
End: 2025-01-03
Attending: STUDENT IN AN ORGANIZED HEALTH CARE EDUCATION/TRAINING PROGRAM
Payer: MEDICARE

## 2025-01-03 DIAGNOSIS — R97.20 ABNORMAL PSA: ICD-10-CM

## 2025-01-03 LAB
PROSTATE SPECIFIC ANTIGEN, TOTAL: 13.6 NG/ML (ref 0–4)
PSA FREE MFR SERPL: 10.66 %
PSA FREE SERPL-MCNC: 1.45 NG/ML (ref 0–1.5)

## 2025-01-03 PROCEDURE — 84153 ASSAY OF PSA TOTAL: CPT | Mod: HCNC | Performed by: STUDENT IN AN ORGANIZED HEALTH CARE EDUCATION/TRAINING PROGRAM

## 2025-01-03 PROCEDURE — 36415 COLL VENOUS BLD VENIPUNCTURE: CPT | Mod: HCNC,PO | Performed by: STUDENT IN AN ORGANIZED HEALTH CARE EDUCATION/TRAINING PROGRAM

## 2025-01-06 ENCOUNTER — LAB VISIT (OUTPATIENT)
Dept: LAB | Facility: HOSPITAL | Age: 80
End: 2025-01-06
Attending: STUDENT IN AN ORGANIZED HEALTH CARE EDUCATION/TRAINING PROGRAM
Payer: MEDICARE

## 2025-01-06 ENCOUNTER — OFFICE VISIT (OUTPATIENT)
Dept: NEPHROLOGY | Facility: CLINIC | Age: 80
End: 2025-01-06
Payer: MEDICARE

## 2025-01-06 VITALS
DIASTOLIC BLOOD PRESSURE: 64 MMHG | BODY MASS INDEX: 36.12 KG/M2 | HEIGHT: 74 IN | SYSTOLIC BLOOD PRESSURE: 128 MMHG | HEART RATE: 70 BPM | OXYGEN SATURATION: 98 %

## 2025-01-06 DIAGNOSIS — E66.01 CLASS 2 SEVERE OBESITY DUE TO EXCESS CALORIES WITH SERIOUS COMORBIDITY AND BODY MASS INDEX (BMI) OF 36.0 TO 36.9 IN ADULT: ICD-10-CM

## 2025-01-06 DIAGNOSIS — I25.83 CORONARY ARTERY DISEASE DUE TO LIPID RICH PLAQUE: ICD-10-CM

## 2025-01-06 DIAGNOSIS — E66.812 CLASS 2 SEVERE OBESITY DUE TO EXCESS CALORIES WITH SERIOUS COMORBIDITY AND BODY MASS INDEX (BMI) OF 36.0 TO 36.9 IN ADULT: ICD-10-CM

## 2025-01-06 DIAGNOSIS — E11.42 DIABETIC POLYNEUROPATHY ASSOCIATED WITH TYPE 2 DIABETES MELLITUS: ICD-10-CM

## 2025-01-06 DIAGNOSIS — N18.31 STAGE 3A CHRONIC KIDNEY DISEASE: Primary | ICD-10-CM

## 2025-01-06 DIAGNOSIS — R60.0 BILATERAL LOWER EXTREMITY EDEMA: ICD-10-CM

## 2025-01-06 DIAGNOSIS — I10 ESSENTIAL HYPERTENSION: ICD-10-CM

## 2025-01-06 DIAGNOSIS — C67.8 MALIGNANT NEOPLASM OF OVERLAPPING SITES OF BLADDER: ICD-10-CM

## 2025-01-06 DIAGNOSIS — N18.31 STAGE 3A CHRONIC KIDNEY DISEASE: ICD-10-CM

## 2025-01-06 DIAGNOSIS — I25.10 CORONARY ARTERY DISEASE DUE TO LIPID RICH PLAQUE: ICD-10-CM

## 2025-01-06 DIAGNOSIS — E78.2 MIXED HYPERLIPIDEMIA: ICD-10-CM

## 2025-01-06 LAB
ALBUMIN/CREAT UR: 17.3 UG/MG (ref 0–30)
BACTERIA #/AREA URNS HPF: ABNORMAL /HPF
BILIRUB UR QL STRIP: NEGATIVE
CLARITY UR: CLEAR
COLOR UR: YELLOW
CREAT UR-MCNC: 225 MG/DL (ref 23–375)
CREAT UR-MCNC: 225 MG/DL (ref 23–375)
GLUCOSE UR QL STRIP: NEGATIVE
HGB UR QL STRIP: ABNORMAL
KETONES UR QL STRIP: NEGATIVE
LEUKOCYTE ESTERASE UR QL STRIP: NEGATIVE
MICROALBUMIN UR DL<=1MG/L-MCNC: 39 UG/ML
MICROSCOPIC COMMENT: ABNORMAL
NITRITE UR QL STRIP: NEGATIVE
PH UR STRIP: 6 [PH] (ref 5–8)
PROT UR QL STRIP: NEGATIVE
PROT UR-MCNC: 18 MG/DL (ref 0–15)
PROT/CREAT UR: 0.08 MG/G{CREAT} (ref 0–0.2)
RBC #/AREA URNS HPF: 30 /HPF (ref 0–4)
SP GR UR STRIP: 1.02 (ref 1–1.03)
URN SPEC COLLECT METH UR: ABNORMAL
WBC #/AREA URNS HPF: 2 /HPF (ref 0–5)

## 2025-01-06 PROCEDURE — 82570 ASSAY OF URINE CREATININE: CPT | Mod: HCNC | Performed by: STUDENT IN AN ORGANIZED HEALTH CARE EDUCATION/TRAINING PROGRAM

## 2025-01-06 PROCEDURE — 82043 UR ALBUMIN QUANTITATIVE: CPT | Mod: HCNC | Performed by: STUDENT IN AN ORGANIZED HEALTH CARE EDUCATION/TRAINING PROGRAM

## 2025-01-06 PROCEDURE — 99999 PR PBB SHADOW E&M-EST. PATIENT-LVL III: CPT | Mod: PBBFAC,HCNC,, | Performed by: STUDENT IN AN ORGANIZED HEALTH CARE EDUCATION/TRAINING PROGRAM

## 2025-01-06 PROCEDURE — 81000 URINALYSIS NONAUTO W/SCOPE: CPT | Mod: HCNC,PO | Performed by: STUDENT IN AN ORGANIZED HEALTH CARE EDUCATION/TRAINING PROGRAM

## 2025-01-06 RX ORDER — METOLAZONE 5 MG/1
5 TABLET ORAL
Qty: 12 TABLET | Refills: 3 | Status: SHIPPED | OUTPATIENT
Start: 2025-01-06 | End: 2026-01-06

## 2025-01-06 NOTE — PROGRESS NOTES
[facility]  Nephrology Clinic    Subjective:       HPI ID: Nate Bah is a 79 y.o. male who returns for ongoing evaluation and management of CKD.   He is pertinent ongoing medical conditions of hyperuricemia with gout history, GERD, mixed hyperlipidemia, hypertension dx nearly 50 years ago, dm type II without long term use of insulin dx 5 yr ago, paroxysmal atrial fibrillation, BPH, coronary artery disease status post stents, status post Watchman, and recent hospitalization with diagnosis of bladder cancer.  He has started BCG instillations in the interim with Urology followed by Dr. Julian.  Referred to Nephrology Clinic for evaluation into chronic kidney disease.  We reviewed his most recent metabolic panel from 09/06/2024 featuring a serum creatinine of 1.5 mg/dL, which appears consistent with his baseline of 1.2-1.5 over the last 2 years.  A contemporary urinalysis from the same day was bland.    In terms of his renal history, he denies hospitalizations for prior JACKSON requiring RRT. Denies history of nephrolithiasis episodes. No reported history of frequent or recurrent use of NSAIDs/COXI. No pertinent rheumatologic or AI disease history. Denies any pertinent family history of known kidney disease, or family members diagnosed with ESRD requiring dialysis.  Smoking Hx: quit almost 50 years ago  Other pertinent urologic history: see above  Fluid intake per 24hr: roughly 100oz of water.     Interval history:  1/6/25 - Seen today for f/u evaluation. Feels well and denies acute complaints. Reports uneventful NAVA.      Review of Systems   Constitutional:  Negative for chills, diaphoresis and fever.   Respiratory:  Negative for cough and shortness of breath.    Cardiovascular:  Positive for chest pain (recent rib fracture). Negative for leg swelling.   Gastrointestinal:  Negative for abdominal pain, diarrhea, nausea and vomiting.   Genitourinary:  Negative for difficulty urinating, dysuria  and hematuria.   Musculoskeletal:  Negative for myalgias.   Neurological:  Negative for headaches.   Hematological:  Does not bruise/bleed easily.       The past medical, family and social histories were reviewed for this encounter.     Past Medical History:   Diagnosis Date    JACKSON (acute kidney injury) 12/04/2016    Anticoagulant long-term use     BPH (benign prostatic hyperplasia)     Cancer     skin cancer to arms; bladder cancer    Coronary artery disease     afib, stents, pacemaker watchman  stents x 5    Diabetes mellitus     Edema     Esophagus disorder     esophagus spasms    GERD without esophagitis     Gout     Hypertension     Mixed hyperlipidemia     Obesity (BMI 30-39.9)     Paroxysmal atrial fibrillation     Salmonella gastroenteritis 01/17/2023    Sleep apnea     no Cpap       Current Outpatient Medications   Medication Sig    allopurinoL (ZYLOPRIM) 300 MG tablet TAKE 1 TABLET EVERY DAY    ascorbic acid (VITAMIN C ORAL) Take 1 tablet by mouth once daily.    cholecalciferol, vitamin D3, (VITAMIN D3) 10 mcg (400 unit) Tab Take 400 Units by mouth once daily.    docusate sodium (COLACE) 100 MG capsule Take 100 mg by mouth 2 (two) times daily.    furosemide (LASIX) 40 MG tablet TAKE 1 TABLET ONE TIME DAILY    glipiZIDE (GLUCOTROL) 5 MG tablet Take 1 tablet (5 mg total) by mouth daily with breakfast.    isosorbide mononitrate (IMDUR) 60 MG 24 hr tablet TAKE 1 TABLET EVERY EVENING    LIDOcaine (LIDODERM) 5 % Place 1 patch onto the skin once daily. Remove & Discard patch within 12 hours or as directed by MD    losartan (COZAAR) 100 MG tablet TAKE 1 TABLET EVERY DAY (Patient taking differently: Take 100 mg by mouth once daily.)    pantoprazole (PROTONIX) 40 MG tablet Take 1 tablet (40 mg total) by mouth once daily.    rosuvastatin (CRESTOR) 10 MG tablet Take 1 tablet (10 mg total) by mouth every evening.    tamsulosin (FLOMAX) 0.4 mg Cap Take 1 capsule (0.4 mg total) by mouth every evening.    econazole  "nitrate 1 % cream Apply topically once daily. (Patient not taking: Reported on 1/6/2025)    ketoconazole (NIZORAL) 2 % cream Apply 1 application  topically daily as needed (to fungal rash on both legs). (Patient not taking: Reported on 1/6/2025)    magnesium oxide (MAG-OX) 400 mg (241.3 mg magnesium) tablet Take 400 mg by mouth every evening.    sucralfate (CARAFATE) 100 mg/mL suspension Take 10 mLs (1 g total) by mouth 4 (four) times daily before meals and nightly. (Patient not taking: Reported on 1/6/2025)     No current facility-administered medications for this visit.         Objective:   /64 (BP Location: Right arm, Patient Position: Sitting)   Pulse 70   Ht 6' 2" (1.88 m)   SpO2 98%   BMI 36.12 kg/m²      Physical Exam  Vitals reviewed.   Constitutional:       General: He is not in acute distress.     Appearance: He is obese.   HENT:      Head: Normocephalic and atraumatic.   Cardiovascular:      Pulses: Normal pulses.      Heart sounds: Normal heart sounds.      No friction rub.   Pulmonary:      Effort: Pulmonary effort is normal. No respiratory distress.      Breath sounds: Normal breath sounds.   Abdominal:      General: Abdomen is protuberant.      Palpations: Abdomen is soft.   Musculoskeletal:         General: Swelling present.      Cervical back: Normal range of motion. No tenderness.      Right lower leg: Edema present.      Left lower leg: Edema present.   Neurological:      General: No focal deficit present.      Mental Status: He is oriented to person, place, and time.      Motor: No weakness.   Psychiatric:         Mood and Affect: Mood normal.         Behavior: Behavior normal.         Assessment:     Lab Results   Component Value Date    CREATININE 1.5 (H) 12/11/2024    BUN 20 12/11/2024     12/11/2024    K 4.7 12/11/2024     12/11/2024    CO2 29 12/11/2024     Lab Results   Component Value Date    CALCIUM 10.3 12/11/2024    PHOS 2.9 01/23/2023     Lab Results   Component " Value Date    HCT 43.6 12/11/2024     Prot/Creat Ratio, Urine   Date Value Ref Range Status   01/20/2023 215.1 (H) 15.0 - 68.0 mg/g Final       Lab Results   Component Value Date    MICALBCREAT Unable to calculate 03/21/2024    MICALBCREAT 11.2 01/20/2023    MICALBCREAT Unable to calculate 09/14/2022    MICALBCREAT Unable to calculate 03/03/2020    MICALBCREAT Unable to calculate 06/04/2019    MICALBCREAT 12.8 03/07/2019           1. Stage 3a chronic kidney disease    2. Diabetic polyneuropathy associated with type 2 diabetes mellitus    3. Coronary artery disease + Ectasia, h/o CABG & stentsCABG x 1, LIMA to LAD 8/2003 [place], Stent RCA 2002, stent LCx 7/09, stent ostial LAD, LCx 11/11, 7/18 stent prox LAD 4.0 x 28 Synergy    4. Essential hypertension    5. Mixed hyperlipidemia    6. Malignant neoplasm of overlapping sites of bladder    7. Class 2 severe obesity due to excess calories with serious comorbidity and body mass index (BMI) of 36.0 to 36.9 in adult          Plan:   Return to clinic in 3 months  Labs for next visit include rfp, uacr, upcr, ua, pth, cbc.  Baseline creatinine is 1.2-1.5mg/dL since 2022.    CKD stage G3a/ A1  -risk:  Age, longstanding multiyear hypertension, recent diabetes though controlled, cardiovascular disease, prostate/bladder disease  -reviewed prior imaging, previous right-sided mild hydronephrosis has improved.   -on Max RASI for CKD-MACE risk reduction, proteinuria reduction and reynold-protection  -low risk of progression based on KFR model: 0.21% risk at 5 years  -continue observation with supportive care.  With  low albuminuria no need to escalate therapy at this time.    Coronary artery disease/paroxysmal atrial fibrillation-per Cardiology     Hypertension-blood pressure trends reviewed.  Medication list reviewed.  Continue current medications, including ARB at this time.      Mixed hyperlipidemia-remains on rosuvastatin-discussed Mediterranean diet again with the patient  today.    Severe obesity elevated BMI-discuss diet and exercise.  He is actively losing weight currently with intent to lose more weight.  He is still under 300lbs. We discussed obtaining a pedometer in targeting 6000 steps a day and working up from there.    BPH-on Flomax.  Per Urology.    Neoplasm of bladder  Microscopic hematuria  -per urology, seeing them tomorrow. H/o BCG therapy.    MBD evaluation-continue vitamin-D for now, pth/phos okay    Bilateral lower extremity edema - continue furosemide 40mg QD, add metolazone 5mg Q 7 days.     __________________________  Raymundo Coffey MD  [facility] [place]    Part of this note has been created using [email or web address] dictation system. Errors in transcription may not be completely avoided.    Computed KFRE 2-Year unavailable. One or more values for this score either were not found within the given timeframe or did not fit some other criterion.    Computed KFRE 5-Year unavailable. One or more values for this score either were not found within the given timeframe or did not fit some other criterion.

## 2025-01-07 ENCOUNTER — OFFICE VISIT (OUTPATIENT)
Dept: UROLOGY | Facility: CLINIC | Age: 80
End: 2025-01-07
Payer: MEDICARE

## 2025-01-07 VITALS — WEIGHT: 293.88 LBS | HEIGHT: 74 IN | BODY MASS INDEX: 37.72 KG/M2

## 2025-01-07 DIAGNOSIS — N13.8 BPH WITH URINARY OBSTRUCTION: ICD-10-CM

## 2025-01-07 DIAGNOSIS — N40.1 BPH WITH URINARY OBSTRUCTION: ICD-10-CM

## 2025-01-07 DIAGNOSIS — R97.20 ELEVATED PSA: Primary | ICD-10-CM

## 2025-01-07 DIAGNOSIS — C67.8 MALIGNANT NEOPLASM OF OVERLAPPING SITES OF BLADDER: ICD-10-CM

## 2025-01-07 LAB
BACTERIA #/AREA URNS HPF: NORMAL /HPF
BILIRUBIN, UA POC OHS: NEGATIVE
BLOOD, UA POC OHS: ABNORMAL
CLARITY, UA POC OHS: CLEAR
COLOR, UA POC OHS: YELLOW
GLUCOSE, UA POC OHS: NEGATIVE
KETONES, UA POC OHS: NEGATIVE
LEUKOCYTES, UA POC OHS: NEGATIVE
MICROSCOPIC COMMENT: NORMAL
NITRITE, UA POC OHS: NEGATIVE
PH, UA POC OHS: 7
PROTEIN, UA POC OHS: NEGATIVE
RBC #/AREA URNS HPF: 2 /HPF (ref 0–4)
SPECIFIC GRAVITY, UA POC OHS: 1.01
SQUAMOUS #/AREA URNS HPF: 1 /HPF
UROBILINOGEN, UA POC OHS: 0.2

## 2025-01-07 PROCEDURE — 1159F MED LIST DOCD IN RCRD: CPT | Mod: HCNC,CPTII,S$GLB, | Performed by: STUDENT IN AN ORGANIZED HEALTH CARE EDUCATION/TRAINING PROGRAM

## 2025-01-07 PROCEDURE — 87086 URINE CULTURE/COLONY COUNT: CPT | Mod: HCNC | Performed by: STUDENT IN AN ORGANIZED HEALTH CARE EDUCATION/TRAINING PROGRAM

## 2025-01-07 PROCEDURE — 81003 URINALYSIS AUTO W/O SCOPE: CPT | Mod: QW,HCNC,S$GLB, | Performed by: STUDENT IN AN ORGANIZED HEALTH CARE EDUCATION/TRAINING PROGRAM

## 2025-01-07 PROCEDURE — 1160F RVW MEDS BY RX/DR IN RCRD: CPT | Mod: HCNC,CPTII,S$GLB, | Performed by: STUDENT IN AN ORGANIZED HEALTH CARE EDUCATION/TRAINING PROGRAM

## 2025-01-07 PROCEDURE — G2211 COMPLEX E/M VISIT ADD ON: HCPCS | Mod: HCNC,S$GLB,, | Performed by: STUDENT IN AN ORGANIZED HEALTH CARE EDUCATION/TRAINING PROGRAM

## 2025-01-07 PROCEDURE — 1100F PTFALLS ASSESS-DOCD GE2>/YR: CPT | Mod: HCNC,CPTII,S$GLB, | Performed by: STUDENT IN AN ORGANIZED HEALTH CARE EDUCATION/TRAINING PROGRAM

## 2025-01-07 PROCEDURE — 99999 PR PBB SHADOW E&M-EST. PATIENT-LVL III: CPT | Mod: PBBFAC,HCNC,, | Performed by: STUDENT IN AN ORGANIZED HEALTH CARE EDUCATION/TRAINING PROGRAM

## 2025-01-07 PROCEDURE — 1125F AMNT PAIN NOTED PAIN PRSNT: CPT | Mod: HCNC,CPTII,S$GLB, | Performed by: STUDENT IN AN ORGANIZED HEALTH CARE EDUCATION/TRAINING PROGRAM

## 2025-01-07 PROCEDURE — 3288F FALL RISK ASSESSMENT DOCD: CPT | Mod: HCNC,CPTII,S$GLB, | Performed by: STUDENT IN AN ORGANIZED HEALTH CARE EDUCATION/TRAINING PROGRAM

## 2025-01-07 PROCEDURE — 81000 URINALYSIS NONAUTO W/SCOPE: CPT | Mod: HCNC,PO | Performed by: STUDENT IN AN ORGANIZED HEALTH CARE EDUCATION/TRAINING PROGRAM

## 2025-01-07 PROCEDURE — 99214 OFFICE O/P EST MOD 30 MIN: CPT | Mod: HCNC,S$GLB,, | Performed by: STUDENT IN AN ORGANIZED HEALTH CARE EDUCATION/TRAINING PROGRAM

## 2025-01-07 RX ORDER — DOXYCYCLINE 100 MG/1
100 CAPSULE ORAL EVERY 12 HOURS
Qty: 60 CAPSULE | Refills: 0 | Status: SHIPPED | OUTPATIENT
Start: 2025-01-07 | End: 2025-02-06

## 2025-01-07 NOTE — PROGRESS NOTES
[facility] - Urology   Clinic Note    Subjective:     Chief Complaint: Elevated PSA      History of Present Illness    Nate presents with an elevated PSA levels, ongoing urinary symptoms in the context of a history of bladder cancer and prostate concerns.    Nate reports ongoing urinary symptoms, including difficulty urinating and urine stream dispersion. He previously had hematuria but denies current visible blood in urine. Nate denies any pain, including during urination. IPSS 8/2.     His most recent PSA test, done last week, showed a level of 13.6 with a free percent of 10%.  PSA has been trending up.    Nate has a history of bladder cancer. He underwent a biopsy on July 10th, which showed high-grade TA, an intermediate-risk type of bladder cancer. He subsequently received BCG infusions. His most recent cystoscopy showed no evidence of recurrence.    A CT from 12/2024 was independently reviewed and interpreted showing showed a prostatic hypodensity, potentially indicating a small infection or abscess.  His CT showed no evidence of hydronephrosis bilaterally.  Nate recently completed a short course of antibiotics prescribed with his PCP for abdominal pain empirically treated for diverticulitis, which provided some relief.  He was unable to get an MRI due to his pacemaker.    A urinalysis performed yesterday showed microscopic blood. The most recent urine culture showed no growth.     He has bladder cancer with details listed below.       Bladder Cancer Chronology:     7/10/2024 - TURBT - HG Ta, 2.5 cm, muscle present and not involved, intermediate risk  9/2024 - BCG induction 5/6  10/2024 - cysto and cytology FLORIDALMA     PSA   Latest Ref Rng 0.00 - 4.00 ng/mL   3/3/2020 2.5    9/14/2023 4.8 (H)    10/20/2023 4.3 (H)    12/2/2024 11.0 (H)    1/3/2025 13.6 (H)  10.66 % free      Past medical, family, surgical and social history reviewed as documented in chart with pertinent positive medical, family, surgical and social  "history detailed in HPI.    A review systems was conducted with pertinent positive and negative findings documented in HPI.    Objective:     Estimated body mass index is 37.73 kg/m² as calculated from the following:    Height as of this encounter: 6' 2" (1.88 m).    Weight as of this encounter: 133.3 kg (293 lb 14 oz).    Vital Signs (Most Recent)       General: No acute distress, well developed.   Head: Normocephalic, atraumatic  CV: no cyanosis  Lungs: normal respiratory effort, no respiratory distress  :  Prostate 40 g no nodules or induration    Labs reviewed below:  Lab Results   Component Value Date    BUN 19 01/06/2025    CREATININE 1.2 01/06/2025    WBC 7.95 01/06/2025    HGB 14.1 01/06/2025    HCT 44.1 01/06/2025     01/06/2025    AST 15 12/11/2024    ALT 16 12/11/2024    ALKPHOS 111 12/11/2024    ALBUMIN 3.6 01/06/2025    HGBA1C 7.0 (H) 07/02/2024        PSA trend reviewed below:  Lab Results   Component Value Date    PSA 4.8 (H) 09/14/2023    PSA 2.5 03/03/2020    PSA 2.6 08/17/2017    PSADIAG 11.0 (H) 12/02/2024    PSADIAG 4.3 (H) 10/20/2023    PSAFREE 1.45 01/03/2025    PSAFREEPCT 10.66 01/03/2025        Urine dipstick today showed small blood, no leukocyte esterase, and negative nitrite.     Assessment:     1. Elevated PSA    2. Malignant neoplasm of overlapping sites of bladder    3. BPH with urinary obstruction      Plan:     Assessment & Plan    - Reviewed patient's history of elevated PSA (13.6) and low free PSA percentage (10%)  - Considered conservative approach for prostate cancer screening and treatment in patients over 75. Discussed limitations of prostate cancer screening and treatment options  - I explained that antibiotics are typically not recommended during elevated PSA evaluation, however given the CT findings I suspect there may be a degree of infection altering the PSA.  Planned antibiotic course to rule out prostate infection as cause of PSA elevation and CT abnormality  - " Will reassess need for prostate biopsy based on post-antibiotic PSA results  - Noted recent bladder cancer diagnosis (high-grade Ta) and BCG treatment  - Cancelled renal ultrasound due to recent CT showing no hydronephrosis    - Cancelled renal ultrasound  - Maintained scheduled cystoscopy for bladder cancer follow-up    - Started doxycycline for 4 weeks to address potential prostate infection  - Continue Flomax for the BPH and lower urinary tract symptoms    - Follow up after completing 4-week course of antibiotics for repeat PSA test  - if his PSA remains elevated we will consider prostate biopsy.  We discussed the biopsy as well as the risks benefits and expectations.  - Follow up as scheduled for cystoscopy for bladder cancer follow-up        The visit today included increased complexity associated with the care of the episodic problem addressed above as well as managing the longitudinal care of the patient due to the serious and/or complex managed problem(s).      Portions of this note were generated by [employer] and [employer] dictation services    Lazaro Julian MD

## 2025-01-08 LAB — BACTERIA UR CULT: NO GROWTH

## 2025-01-10 ENCOUNTER — PROCEDURE VISIT (OUTPATIENT)
Dept: UROLOGY | Facility: CLINIC | Age: 80
End: 2025-01-10
Payer: MEDICARE

## 2025-01-10 VITALS — WEIGHT: 287.69 LBS | HEIGHT: 74 IN | BODY MASS INDEX: 36.92 KG/M2

## 2025-01-10 DIAGNOSIS — C67.8 MALIGNANT NEOPLASM OF OVERLAPPING SITES OF BLADDER: Primary | ICD-10-CM

## 2025-01-10 LAB
BILIRUBIN, UA POC OHS: NEGATIVE
BLOOD, UA POC OHS: ABNORMAL
CLARITY, UA POC OHS: CLEAR
COLOR, UA POC OHS: YELLOW
GLUCOSE, UA POC OHS: NEGATIVE
KETONES, UA POC OHS: NEGATIVE
LEUKOCYTES, UA POC OHS: NEGATIVE
NITRITE, UA POC OHS: NEGATIVE
PH, UA POC OHS: 7
PROTEIN, UA POC OHS: NEGATIVE
SPECIFIC GRAVITY, UA POC OHS: 1.01
UROBILINOGEN, UA POC OHS: 0.2

## 2025-01-10 NOTE — PROCEDURES
Cystoscopy    Date/Time: 1/10/2025 10:00 AM    Performed by: Lazaro Julian MD  Authorized by: Lazaro Julian MD      Procedure:   Flexible cysto-urethroscopy    Pre Procedure Diagnosis:  personal history of bladder cancer    Post Procedure Diagnosis:  Same     Surgeon: Lazaro Julian MD     Anesthesia: 2% uro-jet lidocaine jelly for local analgesia    Procedure note:  The risks and benefits were explained and informed consent was obtained. 2% lidocaine urojet was used for local analgesia. The genitalia was prepped and draped in the sterile fashion.    The flexible scope was advanced into the urethra and into the bladder. There was narrowing at the fossa navicularis which was able to accommodate the scope with gentle pressure. Mild narrowing near the bulbar urethra which was passed easily with the scope.     The bladder was completely surveyed in a systematic fashion.  The orifice was seen in normal anatomic position.  The right UO had previously been resected and was pinpoint in appearance but seen to efflux.  There were no foreign bodies or stones. There were 1+ trabeculations and no diverticula. No bladder tumors or lesions suspicious for malignancy were seen.     Upon retroflexion there was no significant median lobe enlargement. As the flexible cystoscope was being withdrawn, the bladder neck and prostate were evaluated. The lateral lobes of the prostate were mildly enlarged. The estimated prostatic urethral length was 3 cm.     The patient tolerated the procedure well without complication.     Findings in summary:  No evidence of recurrence  Pinpoint right UO    Assessment: FLORIDALMA  Imaging studies have shown no hydronephrosis, continue to monitor    Plan:  Cytology  Cystoscopy 6 months  Keep follow up as scheduled for repeat PSA

## 2025-01-16 PROBLEM — Z86.0100 HISTORY OF COLON POLYPS: Status: ACTIVE | Noted: 2025-01-16

## 2025-01-30 DIAGNOSIS — Z00.00 ENCOUNTER FOR MEDICARE ANNUAL WELLNESS EXAM: ICD-10-CM

## 2025-02-07 ENCOUNTER — LAB VISIT (OUTPATIENT)
Dept: LAB | Facility: HOSPITAL | Age: 80
End: 2025-02-07
Attending: STUDENT IN AN ORGANIZED HEALTH CARE EDUCATION/TRAINING PROGRAM
Payer: MEDICARE

## 2025-02-07 DIAGNOSIS — R97.20 ELEVATED PSA: ICD-10-CM

## 2025-02-07 LAB
PROSTATE SPECIFIC ANTIGEN, TOTAL: 9.4 NG/ML (ref 0–4)
PSA FREE MFR SERPL: 10.32 %
PSA FREE SERPL-MCNC: 0.97 NG/ML (ref 0–1.5)

## 2025-02-07 PROCEDURE — 36415 COLL VENOUS BLD VENIPUNCTURE: CPT | Mod: HCNC,PO | Performed by: STUDENT IN AN ORGANIZED HEALTH CARE EDUCATION/TRAINING PROGRAM

## 2025-02-07 PROCEDURE — 84153 ASSAY OF PSA TOTAL: CPT | Mod: HCNC | Performed by: STUDENT IN AN ORGANIZED HEALTH CARE EDUCATION/TRAINING PROGRAM

## 2025-02-11 DIAGNOSIS — R97.20 ELEVATED PSA: Primary | ICD-10-CM

## 2025-02-11 NOTE — PROGRESS NOTES
Discussed PSA results with the patient's daughter Maye.  Discussed the risks and benefits of further prostate cancer screening and prostate biopsy.  We discussed his comorbidities and the decreased utility of screening as patients age.  She will discuss with the patient's cardiologist regarding pacemakers MRI compatibility and attempt to obtain the prostate MRI again.  We will defer a biopsy at this time and follow up in 3 months with a PSA prior    Please schedule PSA in 3 months with a visit to follow

## 2025-03-14 ENCOUNTER — PATIENT MESSAGE (OUTPATIENT)
Dept: UROLOGY | Facility: CLINIC | Age: 80
End: 2025-03-14
Payer: MEDICARE

## 2025-03-24 ENCOUNTER — RESULTS FOLLOW-UP (OUTPATIENT)
Dept: UROLOGY | Facility: CLINIC | Age: 80
End: 2025-03-24

## 2025-03-24 DIAGNOSIS — R97.20 ELEVATED PSA: Primary | ICD-10-CM

## 2025-03-25 ENCOUNTER — LAB VISIT (OUTPATIENT)
Dept: LAB | Facility: HOSPITAL | Age: 80
End: 2025-03-25
Attending: INTERNAL MEDICINE
Payer: MEDICARE

## 2025-03-25 DIAGNOSIS — E11.9 TYPE 2 DIABETES MELLITUS WITHOUT COMPLICATION, WITHOUT LONG-TERM CURRENT USE OF INSULIN: ICD-10-CM

## 2025-03-25 PROBLEM — S22.31XA RIGHT RIB FRACTURE: Status: ACTIVE | Noted: 2025-03-25

## 2025-03-25 PROBLEM — C44.90 SKIN CANCER: Status: ACTIVE | Noted: 2025-03-25

## 2025-03-25 PROBLEM — S22.069A T8 VERTEBRAL FRACTURE: Status: ACTIVE | Noted: 2025-03-25

## 2025-03-25 PROBLEM — A41.9 SEPSIS WITH HYPOTENSION: Status: ACTIVE | Noted: 2025-03-25

## 2025-03-25 PROBLEM — I25.10 CAD (CORONARY ARTERY DISEASE): Status: ACTIVE | Noted: 2025-03-25

## 2025-03-25 PROBLEM — R97.20 ELEVATED PSA: Status: ACTIVE | Noted: 2025-03-25

## 2025-03-25 PROBLEM — I95.9 SEPSIS WITH HYPOTENSION: Status: ACTIVE | Noted: 2025-03-25

## 2025-03-25 PROBLEM — E66.812 CLASS 2 OBESITY IN ADULT: Status: ACTIVE | Noted: 2025-03-25

## 2025-03-25 LAB
ABSOLUTE EOSINOPHIL (OHS): 0.29 K/UL
ABSOLUTE MONOCYTE (OHS): 0.11 K/UL (ref 0.3–1)
ABSOLUTE NEUTROPHIL COUNT (OHS): 6.84 K/UL (ref 1.8–7.7)
BASOPHILS # BLD AUTO: 0.07 K/UL
BASOPHILS NFR BLD AUTO: 0.8 %
ERYTHROCYTE [DISTWIDTH] IN BLOOD BY AUTOMATED COUNT: 14.7 % (ref 11.5–14.5)
HCT VFR BLD AUTO: 47.6 % (ref 40–54)
HGB BLD-MCNC: 15.9 GM/DL (ref 14–18)
IMM GRANULOCYTES # BLD AUTO: 0.05 K/UL (ref 0–0.04)
IMM GRANULOCYTES NFR BLD AUTO: 0.6 % (ref 0–0.5)
LYMPHOCYTES # BLD AUTO: 0.9 K/UL (ref 1–4.8)
MCH RBC QN AUTO: 30.3 PG (ref 27–50)
MCHC RBC AUTO-ENTMCNC: 33.4 G/DL (ref 32–36)
MCV RBC AUTO: 91 FL (ref 82–98)
NUCLEATED RBC (/100WBC) (OHS): 0 /100 WBC
PLATELET # BLD AUTO: 165 K/UL (ref 150–450)
PMV BLD AUTO: 11.4 FL (ref 9.2–12.9)
RBC # BLD AUTO: 5.25 M/UL (ref 4.6–6.2)
RELATIVE EOSINOPHIL (OHS): 3.5 %
RELATIVE LYMPHOCYTE (OHS): 10.9 % (ref 18–48)
RELATIVE MONOCYTE (OHS): 1.3 % (ref 4–15)
RELATIVE NEUTROPHIL (OHS): 82.9 % (ref 38–73)
WBC # BLD AUTO: 8.26 K/UL (ref 3.9–12.7)

## 2025-03-25 PROCEDURE — 80053 COMPREHEN METABOLIC PANEL: CPT | Mod: HCNC

## 2025-03-25 PROCEDURE — 85025 COMPLETE CBC W/AUTO DIFF WBC: CPT | Mod: HCNC

## 2025-03-25 PROCEDURE — 80061 LIPID PANEL: CPT | Mod: HCNC

## 2025-03-25 PROCEDURE — 83036 HEMOGLOBIN GLYCOSYLATED A1C: CPT | Mod: HCNC

## 2025-03-25 PROCEDURE — 36415 COLL VENOUS BLD VENIPUNCTURE: CPT | Mod: HCNC,PO

## 2025-03-26 PROBLEM — R65.10 SIRS (SYSTEMIC INFLAMMATORY RESPONSE SYNDROME): Status: ACTIVE | Noted: 2025-03-25

## 2025-03-26 LAB
ALBUMIN SERPL BCP-MCNC: 4.1 G/DL (ref 3.5–5.2)
ALP SERPL-CCNC: 107 UNIT/L (ref 40–150)
ALT SERPL W/O P-5'-P-CCNC: 23 UNIT/L (ref 10–44)
ANION GAP (OHS): 13 MMOL/L (ref 8–16)
AST SERPL-CCNC: 20 UNIT/L (ref 11–45)
BILIRUB SERPL-MCNC: 1.6 MG/DL (ref 0.1–1)
BUN SERPL-MCNC: 30 MG/DL (ref 8–23)
CALCIUM SERPL-MCNC: 10.2 MG/DL (ref 8.7–10.5)
CHLORIDE SERPL-SCNC: 99 MMOL/L (ref 95–110)
CHOLEST SERPL-MCNC: 151 MG/DL (ref 120–199)
CHOLEST/HDLC SERPL: 4.3 {RATIO} (ref 2–5)
CO2 SERPL-SCNC: 27 MMOL/L (ref 23–29)
CREAT SERPL-MCNC: 1.5 MG/DL (ref 0.5–1.4)
EAG (OHS): 157 MG/DL (ref 68–131)
GFR SERPLBLD CREATININE-BSD FMLA CKD-EPI: 47 ML/MIN/1.73/M2
GLUCOSE SERPL-MCNC: 165 MG/DL (ref 70–110)
HBA1C MFR BLD: 7.1 % (ref 4–5.6)
HDLC SERPL-MCNC: 35 MG/DL (ref 40–75)
HDLC SERPL: 23.2 % (ref 20–50)
LDLC SERPL CALC-MCNC: 80.4 MG/DL (ref 63–159)
NONHDLC SERPL-MCNC: 116 MG/DL
POTASSIUM SERPL-SCNC: 4.3 MMOL/L (ref 3.5–5.1)
PROT SERPL-MCNC: 7.7 GM/DL (ref 6–8.4)
SODIUM SERPL-SCNC: 139 MMOL/L (ref 136–145)
TRIGL SERPL-MCNC: 178 MG/DL (ref 30–150)

## 2025-03-27 PROBLEM — Z73.6 LIMITATION OF ACTIVITY DUE TO DISABILITY: Status: ACTIVE | Noted: 2025-03-27

## 2025-03-28 ENCOUNTER — TELEPHONE (OUTPATIENT)
Dept: UROLOGY | Facility: CLINIC | Age: 80
End: 2025-03-28
Payer: MEDICARE

## 2025-03-29 PROBLEM — M46.44 DISCITIS OF THORACIC REGION: Status: ACTIVE | Noted: 2025-03-25

## 2025-03-31 ENCOUNTER — TELEPHONE (OUTPATIENT)
Dept: NEUROSURGERY | Facility: CLINIC | Age: 80
End: 2025-03-31
Payer: MEDICARE

## 2025-03-31 DIAGNOSIS — S22.068A OTHER CLOSED FRACTURE OF EIGHTH THORACIC VERTEBRA, INITIAL ENCOUNTER: Primary | ICD-10-CM

## 2025-03-31 NOTE — TELEPHONE ENCOUNTER
----- Message from Tiffany Lafleur NP sent at 3/28/2025  5:27 PM CDT -----  Will need repeat xrays ap/lateral Thoracic spine Upright in 2 weeks please  With Dr. Perez

## 2025-04-01 ENCOUNTER — TELEPHONE (OUTPATIENT)
Dept: UROLOGY | Facility: CLINIC | Age: 80
End: 2025-04-01
Payer: MEDICARE

## 2025-04-01 NOTE — TELEPHONE ENCOUNTER
----- Message from Lazaro Julian MD sent at 3/31/2025 12:21 PM CDT -----  Patient was admitted and is on IV abx for 6 weeks. Please schedule a follow up in about 2-3 weeks visit prior to his scheduled biopsy. We may need to consider delaying it.   -RW  ----- Message -----  From: Interface, Rad Results In  Sent: 3/22/2025   1:27 PM CDT  To: Lazaro Julian MD

## 2025-04-03 ENCOUNTER — OUTPATIENT CASE MANAGEMENT (OUTPATIENT)
Dept: ADMINISTRATIVE | Facility: OTHER | Age: 80
End: 2025-04-03
Payer: MEDICARE

## 2025-04-08 ENCOUNTER — LAB REQUISITION (OUTPATIENT)
Dept: LAB | Facility: HOSPITAL | Age: 80
End: 2025-04-08
Payer: MEDICARE

## 2025-04-08 DIAGNOSIS — Z00.00 ENCOUNTER FOR GENERAL ADULT MEDICAL EXAMINATION WITHOUT ABNORMAL FINDINGS: ICD-10-CM

## 2025-04-08 LAB
ABSOLUTE EOSINOPHIL (OHS): 0.21 K/UL
ABSOLUTE MONOCYTE (OHS): 0.56 K/UL (ref 0.3–1)
ABSOLUTE NEUTROPHIL COUNT (OHS): 5.41 K/UL (ref 1.8–7.7)
ALBUMIN SERPL BCP-MCNC: 4 G/DL (ref 3.5–5.2)
ALP SERPL-CCNC: 100 UNIT/L (ref 40–150)
ALT SERPL W/O P-5'-P-CCNC: 21 UNIT/L (ref 10–44)
ANION GAP (OHS): 12 MMOL/L (ref 8–16)
AST SERPL-CCNC: 24 UNIT/L (ref 11–45)
BASOPHILS # BLD AUTO: 0.06 K/UL
BASOPHILS NFR BLD AUTO: 0.8 %
BILIRUB SERPL-MCNC: 2.1 MG/DL (ref 0.1–1)
BUN SERPL-MCNC: 20 MG/DL (ref 8–23)
CALCIUM SERPL-MCNC: 9.9 MG/DL (ref 8.7–10.5)
CHLORIDE SERPL-SCNC: 100 MMOL/L (ref 95–110)
CK SERPL-CCNC: 79 U/L (ref 20–200)
CO2 SERPL-SCNC: 27 MMOL/L (ref 23–29)
CREAT SERPL-MCNC: 1.1 MG/DL (ref 0.5–1.4)
CRP SERPL-MCNC: 7.8 MG/L
ERYTHROCYTE [DISTWIDTH] IN BLOOD BY AUTOMATED COUNT: 14.6 % (ref 11.5–14.5)
GFR SERPLBLD CREATININE-BSD FMLA CKD-EPI: >60 ML/MIN/1.73/M2
GLUCOSE SERPL-MCNC: 125 MG/DL (ref 70–110)
HCT VFR BLD AUTO: 46.3 % (ref 40–54)
HGB BLD-MCNC: 15.7 GM/DL (ref 14–18)
IMM GRANULOCYTES # BLD AUTO: 0.05 K/UL (ref 0–0.04)
IMM GRANULOCYTES NFR BLD AUTO: 0.7 % (ref 0–0.5)
LYMPHOCYTES # BLD AUTO: 1.26 K/UL (ref 1–4.8)
MCH RBC QN AUTO: 30.8 PG (ref 27–31)
MCHC RBC AUTO-ENTMCNC: 33.9 G/DL (ref 32–36)
MCV RBC AUTO: 91 FL (ref 82–98)
NUCLEATED RBC (/100WBC) (OHS): 0 /100 WBC
PLATELET # BLD AUTO: 173 K/UL (ref 150–450)
PMV BLD AUTO: 12 FL (ref 9.2–12.9)
POTASSIUM SERPL-SCNC: 3.9 MMOL/L (ref 3.5–5.1)
PROT SERPL-MCNC: 7.8 GM/DL (ref 6–8.4)
RBC # BLD AUTO: 5.09 M/UL (ref 4.6–6.2)
RELATIVE EOSINOPHIL (OHS): 2.8 %
RELATIVE LYMPHOCYTE (OHS): 16.7 % (ref 18–48)
RELATIVE MONOCYTE (OHS): 7.4 % (ref 4–15)
RELATIVE NEUTROPHIL (OHS): 71.6 % (ref 38–73)
SODIUM SERPL-SCNC: 139 MMOL/L (ref 136–145)
WBC # BLD AUTO: 7.55 K/UL (ref 3.9–12.7)

## 2025-04-08 PROCEDURE — 85025 COMPLETE CBC W/AUTO DIFF WBC: CPT | Mod: HCNC | Performed by: INTERNAL MEDICINE

## 2025-04-08 PROCEDURE — 80053 COMPREHEN METABOLIC PANEL: CPT | Mod: HCNC | Performed by: INTERNAL MEDICINE

## 2025-04-08 PROCEDURE — 82550 ASSAY OF CK (CPK): CPT | Mod: HCNC | Performed by: INTERNAL MEDICINE

## 2025-04-08 PROCEDURE — 86140 C-REACTIVE PROTEIN: CPT | Mod: HCNC | Performed by: INTERNAL MEDICINE

## 2025-04-15 ENCOUNTER — OFFICE VISIT (OUTPATIENT)
Dept: NEUROSURGERY | Facility: CLINIC | Age: 80
End: 2025-04-15
Payer: MEDICARE

## 2025-04-15 ENCOUNTER — HOSPITAL ENCOUNTER (OUTPATIENT)
Dept: RADIOLOGY | Facility: HOSPITAL | Age: 80
Discharge: HOME | End: 2025-04-15
Attending: NEUROLOGICAL SURGERY
Payer: MEDICARE

## 2025-04-15 VITALS
DIASTOLIC BLOOD PRESSURE: 87 MMHG | HEART RATE: 81 BPM | SYSTOLIC BLOOD PRESSURE: 160 MMHG | BODY MASS INDEX: 37.75 KG/M2 | RESPIRATION RATE: 18 BRPM | HEIGHT: 74 IN | WEIGHT: 294.13 LBS

## 2025-04-15 DIAGNOSIS — S22.068A OTHER CLOSED FRACTURE OF EIGHTH THORACIC VERTEBRA, INITIAL ENCOUNTER: Primary | ICD-10-CM

## 2025-04-15 DIAGNOSIS — S22.068A OTHER CLOSED FRACTURE OF EIGHTH THORACIC VERTEBRA, INITIAL ENCOUNTER: ICD-10-CM

## 2025-04-15 PROCEDURE — 72070 X-RAY EXAM THORAC SPINE 2VWS: CPT | Mod: TC,HCNC,FY,PO

## 2025-04-15 PROCEDURE — 3079F DIAST BP 80-89 MM HG: CPT | Mod: HCNC,CPTII,S$GLB, | Performed by: NEUROLOGICAL SURGERY

## 2025-04-15 PROCEDURE — 1101F PT FALLS ASSESS-DOCD LE1/YR: CPT | Mod: HCNC,CPTII,S$GLB, | Performed by: NEUROLOGICAL SURGERY

## 2025-04-15 PROCEDURE — 72070 X-RAY EXAM THORAC SPINE 2VWS: CPT | Mod: 26,HCNC,, | Performed by: RADIOLOGY

## 2025-04-15 PROCEDURE — 3288F FALL RISK ASSESSMENT DOCD: CPT | Mod: HCNC,CPTII,S$GLB, | Performed by: NEUROLOGICAL SURGERY

## 2025-04-15 PROCEDURE — 1125F AMNT PAIN NOTED PAIN PRSNT: CPT | Mod: HCNC,CPTII,S$GLB, | Performed by: NEUROLOGICAL SURGERY

## 2025-04-15 PROCEDURE — 1159F MED LIST DOCD IN RCRD: CPT | Mod: HCNC,CPTII,S$GLB, | Performed by: NEUROLOGICAL SURGERY

## 2025-04-15 PROCEDURE — 99214 OFFICE O/P EST MOD 30 MIN: CPT | Mod: HCNC,S$GLB,, | Performed by: NEUROLOGICAL SURGERY

## 2025-04-15 PROCEDURE — 3077F SYST BP >= 140 MM HG: CPT | Mod: HCNC,CPTII,S$GLB, | Performed by: NEUROLOGICAL SURGERY

## 2025-04-15 NOTE — PROGRESS NOTES
Neurosurgery History and Physical    Patient ID: Nate Bah is a 79 y.o. male.    Chief Complaint   Patient presents with    Follow-up     2 week f/u w/imaging     HPI 4/15/25:  Patient is a 79-year-old male with bladder cancer, skin cancer, HTN, HLD, CAD with stents x 5, T2DM, HTN, HLD, pAfib sp watchman procedure, obesity BMI 37 with AZAM who presented to the ED on 3/25/2025 for fevers with a current work up of prostate abscess vs cancer with biopsy with Dr. Julian urology on 5/2. He has a PICC line with antibiotics treated with Dr. Leija, follow up 5/6. Home health is coming to help with his PICC line and antibiotic treatment. He was diagnosed with an 8th rib fracture after a slip in December and T8 vertebral body hyperextension fracture not recognized at that time, but possibly worsened by a fall 1 month ago. He was given a brace and has been wearing it, states the brace really helps with pain. He is not in as much pain, daughter thinks he is getting better too not moaning like he used to but still has pain transitioning from sitting to standing. He has no radicular pain.    Review of Systems   Musculoskeletal:  Positive for back pain and gait problem.   Neurological:  Negative for weakness and numbness.       Past Medical History:   Diagnosis Date    JACKSON (acute kidney injury) 12/04/2016    Anticoagulant long-term use     BPH (benign prostatic hyperplasia)     Cancer     skin cancer to arms; bladder cancer    Coronary artery disease     afib, stents, pacemaker watchman  stents x 5    Diabetes mellitus     Edema     Esophagus disorder     esophagus spasms    GERD without esophagitis     Gout     Hypertension     Mixed hyperlipidemia     Obesity (BMI 30-39.9)     Paroxysmal atrial fibrillation     Salmonella gastroenteritis 01/17/2023    Sleep apnea     no Cpap     Social History[1]  Family History   Problem Relation Name Age of Onset    Pancreatic cancer Mother      Heart disease Father      No Known  "Problems Brother       Review of patient's allergies indicates:   Allergen Reactions    Ace inhibitors      cough     Current Medications[2]  Blood pressure (!) 160/87, pulse 81, resp. rate 18, height 6' 2" (1.88 m), weight 133.4 kg (294 lb 1.5 oz).      Neurological Exam  Mental Status  Awake, alert and oriented to person, place and time.    Cranial Nerves  CN VII:  Right: There is no facial weakness.  Left: There is no facial weakness.    Motor                                               Right                     Left  Deltoid                                   5                          5   Biceps                                   5                          5   Triceps                                  5                          5   Wrist flexor                            5                          5   Wrist extensor                       5                          5   Interossei                              5                          5   Iliopsoas                               5                          5   Quadriceps                           5                          5  Ankle dorsiflexor                   5                          5  Ankle plantar flexor              5                           5    Sensory  Light touch is normal in upper and lower extremities.     Reflexes                                            Right                      Left  Biceps                                 2+                         2+  Triceps                                2+                         2+  Hamstring                                                    0  Patellar                                1+                          Achilles                                0                         0    Right pathological reflexes: Krys's absent. Ankle clonus absent.  Left pathological reflexes: Krys's absent. Ankle clonus absent.    Gait  Casual gait:  Wearing TLSO .      Physical Exam  Vitals and nursing note " "reviewed.   Neurological:      Deep Tendon Reflexes:      Reflex Scores:       Tricep reflexes are 2+ on the right side and 2+ on the left side.       Bicep reflexes are 2+ on the right side and 2+ on the left side.       Patellar reflexes are 1+ on the right side.       Achilles reflexes are 0 on the right side and 0 on the left side.        Vital Signs  Pulse: 81  Resp: 18  BP: (!) 160/87  BP Location: Left arm  Patient Position: Sitting  Pain Score:   2  Pain Loc: Back  Height and Weight  Height: 6' 2" (188 cm)  Weight: 133.4 kg (294 lb 1.5 oz)  BSA (Calculated - sq m): 2.64 sq meters  BMI (Calculated): 37.7  Weight in (lb) to have BMI = 25: 194.3]    Provider dictation:  XR thoracic spine reviewed by Dr. Perez to reveal mild progression of T8 vertebral body fracture as expected with standing films. No progressive deformity.     Repeat XR thoracic spine in 4 weeks and then another 6 weeks after we will obtain a CT thoracic spine for assessment of bony healing. Remain in his brace at least for another 4 weeks.     Monitor for balance problems, weakness, numbness, tingling, increase in pain.     Visit Diagnosis:  Other closed fracture of eighth thoracic vertebra, initial encounter  -     X-Ray Thoracic Spine AP Lateral; Future; Expected date: 05/15/2025             [1]   Social History  Socioeconomic History    Marital status:    Tobacco Use    Smoking status: Former     Current packs/day: 0.00     Average packs/day: 1 pack/day for 25.0 years (25.0 ttl pk-yrs)     Types: Cigarettes     Start date: 1985     Quit date: 2010     Years since quitting: 15.2    Smokeless tobacco: Never   Substance and Sexual Activity    Alcohol use: Not Currently     Comment: occasional    Drug use: No    Sexual activity: Yes     Partners: Female     Social Drivers of Health     Financial Resource Strain: Low Risk  (3/26/2025)    Overall Financial Resource Strain (CARDIA)     Difficulty of Paying Living Expenses: Not hard " at all   Food Insecurity: No Food Insecurity (3/26/2025)    Hunger Vital Sign     Worried About Running Out of Food in the Last Year: Never true     Ran Out of Food in the Last Year: Never true   Transportation Needs: No Transportation Needs (3/31/2025)    OASIS : Transportation     Lack of Transportation (Medical): No     Lack of Transportation (Non-Medical): No     Patient Unable or Declines to Respond: No   Physical Activity: Unknown (5/27/2024)    Exercise Vital Sign     Days of Exercise per Week: 0 days   Stress: No Stress Concern Present (3/26/2025)    [employer] - Occupational Stress Questionnaire     Feeling of Stress : Not at all   Housing Stability: Low Risk  (3/26/2025)    Housing Stability Vital Sign     Unable to Pay for Housing in the Last Year: No     Number of Times Moved in the Last Year: 0     Homeless in the Last Year: No   [2]   Current Outpatient Medications:     acetaminophen (TYLENOL) 500 MG tablet, Take 1,000 mg by mouth as needed for Pain. Indications: pain, Disp: , Rfl:     allopurinoL (ZYLOPRIM) 300 MG tablet, TAKE 1 TABLET EVERY DAY (Patient taking differently: Take 300 mg by mouth once daily.), Disp: 90 tablet, Rfl: 3    ascorbic acid (VITAMIN C ORAL), Take 1 tablet by mouth every evening., Disp: , Rfl:     aspirin (ECOTRIN) 81 MG EC tablet, Take 1 tablet (81 mg total) by mouth once daily., Disp: , Rfl:     CHOLECALCIFEROL, VITAMIN D3, ORAL, Take 1 capsule by mouth every evening. Indications: supplement, Disp: , Rfl:     clopidogreL (PLAVIX) 75 mg tablet, TAKE 1 TABLET EVERY DAY, Disp: 90 tablet, Rfl: 3    D5W PgBk 100 mL with cefTRIAXone 2 gram SolR 2 g, Inject 2 g into the vein once daily. (Patient taking differently: Inject 2 g into the vein once daily. CETRIAXONE 2G/20mL SWFI IV over 10 minutes every 24 hours via slow IV push through SL PICC line.), Disp: , Rfl:     DAPTOmycin 50 mg/mL in 0.9% NaCl solution, Inject 16 mLs (800 mg total) into the  vein once daily. (Patient taking differently: Inject 800 mg into the vein once daily. DAPTOMYCIN 800mg/50mL NS IV over 30 minutes (100mL/hr) every 24 hours through SL PICC line.), Disp: , Rfl:     docusate sodium (COLACE) 100 MG capsule, Take 100 mg by mouth 2 (two) times daily., Disp: , Rfl:     furosemide (LASIX) 40 MG tablet, TAKE 1 TABLET ONE TIME DAILY (Patient taking differently: Take 40 mg by mouth once daily.), Disp: 90 tablet, Rfl: 3    glipiZIDE (GLUCOTROL) 5 MG tablet, Take 1 tablet (5 mg total) by mouth daily with breakfast., Disp: 90 tablet, Rfl: 3    heparin sod,porcine/0.9 % NaCl (HEPARIN FLUSH IV), Inject 5 mLs into the vein once daily. Indications: PICC line patency, Disp: , Rfl:     isosorbide mononitrate (IMDUR) 60 MG 24 hr tablet, Take 1 tablet (60 mg total) by mouth every evening., Disp: 90 tablet, Rfl: 3    losartan (COZAAR) 100 MG tablet, Take 1 tablet (100 mg total) by mouth once daily., Disp: 30 tablet, Rfl: 0    magnesium oxide (MAG-OX) 400 mg (241.3 mg magnesium) tablet, Take 400 mg by mouth every evening., Disp: , Rfl:     metOLazone (ZAROXOLYN) 2.5 MG tablet, Take 5 mg by mouth every Saturday., Disp: , Rfl:     pantoprazole (PROTONIX) 40 MG tablet, TAKE 1 TABLET ONE TIME DAILY (Patient taking differently: Take 40 mg by mouth once daily.), Disp: 90 tablet, Rfl: 3    rosuvastatin (CRESTOR) 10 MG tablet, TAKE 1 TABLET EVERY EVENING (Patient taking differently: Take 10 mg by mouth nightly.), Disp: 90 tablet, Rfl: 0    sodium chloride 0.9% (NORMAL SALINE FLUSH) injection, Inject 10 mLs into the vein once daily. Indications: PICC line patency, Disp: , Rfl:     tamsulosin (FLOMAX) 0.4 mg Cap, Take 1 capsule (0.4 mg total) by mouth every evening., Disp: 30 capsule, Rfl: 11

## 2025-04-17 ENCOUNTER — OFFICE VISIT (OUTPATIENT)
Dept: UROLOGY | Facility: CLINIC | Age: 80
End: 2025-04-17
Payer: MEDICARE

## 2025-04-17 VITALS — HEIGHT: 74 IN | BODY MASS INDEX: 36.9 KG/M2 | WEIGHT: 287.5 LBS

## 2025-04-17 DIAGNOSIS — N13.8 BPH WITH URINARY OBSTRUCTION: ICD-10-CM

## 2025-04-17 DIAGNOSIS — C67.8 MALIGNANT NEOPLASM OF OVERLAPPING SITES OF BLADDER: ICD-10-CM

## 2025-04-17 DIAGNOSIS — N40.1 BPH WITH URINARY OBSTRUCTION: ICD-10-CM

## 2025-04-17 DIAGNOSIS — R97.20 ELEVATED PSA: Primary | ICD-10-CM

## 2025-04-17 PROBLEM — N32.89 BLADDER MASS: Status: RESOLVED | Noted: 2024-07-10 | Resolved: 2025-04-17

## 2025-04-17 PROBLEM — R31.0 GROSS HEMATURIA: Status: RESOLVED | Noted: 2024-07-30 | Resolved: 2025-04-17

## 2025-04-17 PROBLEM — C68.0: Status: RESOLVED | Noted: 2024-07-30 | Resolved: 2025-04-17

## 2025-04-17 PROCEDURE — 3288F FALL RISK ASSESSMENT DOCD: CPT | Mod: HCNC,CPTII,S$GLB, | Performed by: STUDENT IN AN ORGANIZED HEALTH CARE EDUCATION/TRAINING PROGRAM

## 2025-04-17 PROCEDURE — 99999 PR PBB SHADOW E&M-EST. PATIENT-LVL III: CPT | Mod: PBBFAC,HCNC,, | Performed by: STUDENT IN AN ORGANIZED HEALTH CARE EDUCATION/TRAINING PROGRAM

## 2025-04-17 PROCEDURE — G2211 COMPLEX E/M VISIT ADD ON: HCPCS | Mod: HCNC,S$GLB,, | Performed by: STUDENT IN AN ORGANIZED HEALTH CARE EDUCATION/TRAINING PROGRAM

## 2025-04-17 PROCEDURE — 1101F PT FALLS ASSESS-DOCD LE1/YR: CPT | Mod: HCNC,CPTII,S$GLB, | Performed by: STUDENT IN AN ORGANIZED HEALTH CARE EDUCATION/TRAINING PROGRAM

## 2025-04-17 PROCEDURE — 1126F AMNT PAIN NOTED NONE PRSNT: CPT | Mod: HCNC,CPTII,S$GLB, | Performed by: STUDENT IN AN ORGANIZED HEALTH CARE EDUCATION/TRAINING PROGRAM

## 2025-04-17 PROCEDURE — 1160F RVW MEDS BY RX/DR IN RCRD: CPT | Mod: HCNC,CPTII,S$GLB, | Performed by: STUDENT IN AN ORGANIZED HEALTH CARE EDUCATION/TRAINING PROGRAM

## 2025-04-17 PROCEDURE — 1159F MED LIST DOCD IN RCRD: CPT | Mod: HCNC,CPTII,S$GLB, | Performed by: STUDENT IN AN ORGANIZED HEALTH CARE EDUCATION/TRAINING PROGRAM

## 2025-04-17 PROCEDURE — 99214 OFFICE O/P EST MOD 30 MIN: CPT | Mod: HCNC,S$GLB,, | Performed by: STUDENT IN AN ORGANIZED HEALTH CARE EDUCATION/TRAINING PROGRAM

## 2025-04-17 PROCEDURE — 81003 URINALYSIS AUTO W/O SCOPE: CPT | Mod: QW,HCNC,S$GLB, | Performed by: STUDENT IN AN ORGANIZED HEALTH CARE EDUCATION/TRAINING PROGRAM

## 2025-04-17 PROCEDURE — 1111F DSCHRG MED/CURRENT MED MERGE: CPT | Mod: HCNC,CPTII,S$GLB, | Performed by: STUDENT IN AN ORGANIZED HEALTH CARE EDUCATION/TRAINING PROGRAM

## 2025-04-17 NOTE — PROGRESS NOTES
[facility]   Clinic Note    Subjective:     Chief Complaint: Consult and Follow-up      History of Present Illness    CHIEF COMPLAINT:  Nate presents today for follow up after recent hospitalization       He has had elevated PSA levels, ongoing urinary symptoms in the context of a history of bladder cancer and prostate concerns.    His PSA has fluctuated but has been elevated. This prompted a prostate MRI from 03/10/2025 showing a PI-RADS 5 lesion at the left apex PZ and a PI-RADS 4 lesion from the right base to apex with central necrosis.  His most recent PSA test, done last week, showed a level of 13.6 with a free percent of 10%.  PSA has been trending up.    PRE-HOSPITALIZATION EPISODE:  He experienced a severe episode before hospitalization where he was unable to drive, with whole-body involvement and sensation of his head being extremely hot. Family members described this as a weak spell with anxiety attack. He denies prior history of anxiety attacks, indicating this would be his first if accurately characterized. The episode resolved upon reaching home.    Diagnose as possible osteomyelitis and at 1 point was concern for possible prostate abscess.  This seems very unlikely to present as a prostate abscess.    He denies current urinary symptoms and reports no urinary problems during recent hospitalization.    He has intermediate risk bladder cancer with details listed below.       Bladder Cancer Chronology:     7/10/2024 - TURBT - HG Ta, 2.5 cm, muscle present and not involved, intermediate risk  9/2024 - BCG induction 5/6  10/2024 - cysto and cytology FLORIDALMA     PSA   Latest Ref Rng 0.00 - 4.00 ng/mL   3/3/2020 2.5    9/14/2023 4.8 (H)    10/20/2023 4.3 (H)    12/2/2024 11.0 (H)    1/3/2025 13.6 (H)  10.66 % free     2/7/2025 9.4 (H) 10.3 % free      CURRENT MEDICATIONS:  He takes aspirin 81mg, Flomax, and Lasix. Plavix has been discontinued.     Past medical, family, surgical and social history reviewed  "as documented in chart with pertinent positive medical, family, surgical and social history detailed in HPI.    A review systems was conducted with pertinent positive and negative findings documented in HPI.    Objective:     Estimated body mass index is 36.91 kg/m² as calculated from the following:    Height as of this encounter: 6' 2" (1.88 m).    Weight as of this encounter: 130.4 kg (287 lb 7.7 oz).    Vital Signs (Most Recent)       General: No acute distress, well developed.   Head: Normocephalic, atraumatic  CV: no cyanosis  Lungs: normal respiratory effort, no respiratory distress  :  Prostate 40 g no nodules or induration    Labs reviewed below:  Lab Results   Component Value Date    BUN 20 04/08/2025    CREATININE 1.1 04/08/2025    WBC 7.55 04/08/2025    HGB 15.7 04/08/2025    HCT 46.3 04/08/2025     04/08/2025    AST 24 04/08/2025    ALT 21 04/08/2025    ALKPHOS 100 04/08/2025    ALBUMIN 4.0 04/08/2025    HGBA1C 6.8 (H) 03/25/2025        PSA trend reviewed below:  Lab Results   Component Value Date    PSA 4.8 (H) 09/14/2023    PSA 2.5 03/03/2020    PSA 2.6 08/17/2017    PSADIAG 11.0 (H) 12/02/2024    PSADIAG 4.3 (H) 10/20/2023    PSAFREE 0.97 02/07/2025    PSAFREE 1.45 01/03/2025    PSAFREEPCT 10.32 02/07/2025    PSAFREEPCT 10.66 01/03/2025        Urine dipstick today showed small blood, no leukocyte esterase, and negative nitrite.     Assessment:     1. Elevated PSA    2. BPH with urinary obstruction    3. Malignant neoplasm of overlapping sites of bladder        Plan:     Assessment & Plan    - Discussed recent hospitalization and ongoing antibiotic treatment for suspected infection, though cultures were negative.  - Considered possibility that recent episode was not infection drive  - Evaluated need for prostate biopsy given age and comorbidities.  - Determined patient is not a candidate for robotic prostatectomy due to age and co-morbidities due to risks outweighing benefits.  - Plan " transperineal prostate biopsy to further evaluate lesions seen on MRI (PIRADS 4 and 5).    - Explained potential outcomes of prostate biopsy: benign result, low-grade cancer requiring monitoring, or high-grade cancer needing treatment.  - Discussed treatment options for prostate cancer may include radiation or hormone therapy rather than surgery due to age.  - Explained transperineal biopsy procedure, including use of US probe and sampling through skin between scrotum and rectum.  - Discussed potential side effects post-biopsy, including blood in urine and worsening urinary symptoms.    - Scheduled transperineal prostate biopsy under anesthesia.    - Discontinued Plavix, now on low-dose aspirin 81 mg daily, with instruction to hold for 1 week before biopsy if possible.    - Follow up after biopsy to review results and determine need for consultation with radiation oncologist based on findings.  - Follow up on scheduled cystoscopy for bladder cancer surveillance at the end of May.        The visit today included increased complexity associated with the care of the episodic problem addressed above as well as managing the longitudinal care of the patient due to the serious and/or complex managed problem(s).      Portions of this note were generated by [employer] and [employer] dictation services    Lazaro Julian MD

## 2025-04-29 DIAGNOSIS — R97.20 ELEVATED PSA: Primary | ICD-10-CM

## 2025-05-05 ENCOUNTER — LAB REQUISITION (OUTPATIENT)
Dept: LAB | Facility: HOSPITAL | Age: 80
End: 2025-05-05
Payer: MEDICARE

## 2025-05-05 DIAGNOSIS — M46.44 DISCITIS, UNSPECIFIED, THORACIC REGION: ICD-10-CM

## 2025-05-05 LAB
ABSOLUTE EOSINOPHIL (OHS): 0.08 K/UL
ABSOLUTE MONOCYTE (OHS): 0.35 K/UL (ref 0.3–1)
ABSOLUTE NEUTROPHIL COUNT (OHS): 4.29 K/UL (ref 1.8–7.7)
ALBUMIN SERPL BCP-MCNC: 3.7 G/DL (ref 3.5–5.2)
ALP SERPL-CCNC: 112 UNIT/L (ref 40–150)
ALT SERPL W/O P-5'-P-CCNC: 18 UNIT/L (ref 10–44)
ANION GAP (OHS): 11 MMOL/L (ref 8–16)
AST SERPL-CCNC: 22 UNIT/L (ref 11–45)
BASOPHILS # BLD AUTO: 0.04 K/UL
BASOPHILS NFR BLD AUTO: 0.7 %
BILIRUB SERPL-MCNC: 1.2 MG/DL (ref 0.1–1)
BUN SERPL-MCNC: 23 MG/DL (ref 8–23)
CALCIUM SERPL-MCNC: 9.3 MG/DL (ref 8.7–10.5)
CHLORIDE SERPL-SCNC: 97 MMOL/L (ref 95–110)
CK SERPL-CCNC: 124 U/L (ref 20–200)
CO2 SERPL-SCNC: 30 MMOL/L (ref 23–29)
CREAT SERPL-MCNC: 1.4 MG/DL (ref 0.5–1.4)
CRP SERPL-MCNC: 16.2 MG/L
ERYTHROCYTE [DISTWIDTH] IN BLOOD BY AUTOMATED COUNT: 14.1 % (ref 11.5–14.5)
GFR SERPLBLD CREATININE-BSD FMLA CKD-EPI: 51 ML/MIN/1.73/M2
GLUCOSE SERPL-MCNC: 86 MG/DL (ref 70–110)
HCT VFR BLD AUTO: 45.1 % (ref 40–54)
HGB BLD-MCNC: 15.2 GM/DL (ref 14–18)
IMM GRANULOCYTES # BLD AUTO: 0.04 K/UL (ref 0–0.04)
IMM GRANULOCYTES NFR BLD AUTO: 0.7 % (ref 0–0.5)
LYMPHOCYTES # BLD AUTO: 0.91 K/UL (ref 1–4.8)
MCH RBC QN AUTO: 30.1 PG (ref 27–31)
MCHC RBC AUTO-ENTMCNC: 33.7 G/DL (ref 32–36)
MCV RBC AUTO: 89 FL (ref 82–98)
NUCLEATED RBC (/100WBC) (OHS): 0 /100 WBC
PLATELET # BLD AUTO: 136 K/UL (ref 150–450)
PMV BLD AUTO: 11.9 FL (ref 9.2–12.9)
POTASSIUM SERPL-SCNC: 3.7 MMOL/L (ref 3.5–5.1)
PROT SERPL-MCNC: 7.2 GM/DL (ref 6–8.4)
RBC # BLD AUTO: 5.05 M/UL (ref 4.6–6.2)
RELATIVE EOSINOPHIL (OHS): 1.4 %
RELATIVE LYMPHOCYTE (OHS): 15.9 % (ref 18–48)
RELATIVE MONOCYTE (OHS): 6.1 % (ref 4–15)
RELATIVE NEUTROPHIL (OHS): 75.2 % (ref 38–73)
SODIUM SERPL-SCNC: 138 MMOL/L (ref 136–145)
WBC # BLD AUTO: 5.71 K/UL (ref 3.9–12.7)

## 2025-05-05 PROCEDURE — 86140 C-REACTIVE PROTEIN: CPT | Mod: HCNC | Performed by: INTERNAL MEDICINE

## 2025-05-05 PROCEDURE — 82550 ASSAY OF CK (CPK): CPT | Mod: HCNC | Performed by: INTERNAL MEDICINE

## 2025-05-05 PROCEDURE — 80053 COMPREHEN METABOLIC PANEL: CPT | Mod: HCNC | Performed by: INTERNAL MEDICINE

## 2025-05-05 PROCEDURE — 85025 COMPLETE CBC W/AUTO DIFF WBC: CPT | Mod: HCNC | Performed by: INTERNAL MEDICINE

## 2025-05-06 ENCOUNTER — LAB VISIT (OUTPATIENT)
Dept: LAB | Facility: HOSPITAL | Age: 80
End: 2025-05-06
Payer: MEDICARE

## 2025-05-06 ENCOUNTER — OFFICE VISIT (OUTPATIENT)
Dept: INFECTIOUS DISEASES | Facility: CLINIC | Age: 80
End: 2025-05-06
Payer: MEDICARE

## 2025-05-06 ENCOUNTER — PATIENT MESSAGE (OUTPATIENT)
Dept: UROLOGY | Facility: CLINIC | Age: 80
End: 2025-05-06
Payer: MEDICARE

## 2025-05-06 ENCOUNTER — PATIENT MESSAGE (OUTPATIENT)
Dept: NEPHROLOGY | Facility: CLINIC | Age: 80
End: 2025-05-06
Payer: MEDICARE

## 2025-05-06 ENCOUNTER — TELEPHONE (OUTPATIENT)
Dept: INFECTIOUS DISEASES | Facility: CLINIC | Age: 80
End: 2025-05-06

## 2025-05-06 VITALS — HEIGHT: 74 IN | BODY MASS INDEX: 36.18 KG/M2 | WEIGHT: 281.94 LBS | RESPIRATION RATE: 20 BRPM | TEMPERATURE: 98 F

## 2025-05-06 DIAGNOSIS — N18.31 STAGE 3A CHRONIC KIDNEY DISEASE: ICD-10-CM

## 2025-05-06 DIAGNOSIS — M46.44 DISCITIS OF THORACIC REGION: Primary | ICD-10-CM

## 2025-05-06 DIAGNOSIS — Z79.2 RECEIVING INTRAVENOUS ANTIBIOTIC TREATMENT AS OUTPATIENT: ICD-10-CM

## 2025-05-06 LAB
ABSOLUTE EOSINOPHIL (OHS): 0.1 K/UL
ABSOLUTE MONOCYTE (OHS): 0.56 K/UL (ref 0.3–1)
ABSOLUTE NEUTROPHIL COUNT (OHS): 3.68 K/UL (ref 1.8–7.7)
ALBUMIN SERPL BCP-MCNC: 3.7 G/DL (ref 3.5–5.2)
ALBUMIN/CREAT UR: 147.2 UG/MG
ANION GAP (OHS): 10 MMOL/L (ref 8–16)
BASOPHILS # BLD AUTO: 0.06 K/UL
BASOPHILS NFR BLD AUTO: 1.1 %
BUN SERPL-MCNC: 32 MG/DL (ref 8–23)
CALCIUM SERPL-MCNC: 9.7 MG/DL (ref 8.7–10.5)
CHLORIDE SERPL-SCNC: 95 MMOL/L (ref 95–110)
CO2 SERPL-SCNC: 28 MMOL/L (ref 23–29)
CREAT SERPL-MCNC: 2 MG/DL (ref 0.5–1.4)
CREAT UR-MCNC: 197 MG/DL (ref 23–375)
CREAT UR-MCNC: 199 MG/DL (ref 23–375)
ERYTHROCYTE [DISTWIDTH] IN BLOOD BY AUTOMATED COUNT: 13.9 % (ref 11.5–14.5)
GFR SERPLBLD CREATININE-BSD FMLA CKD-EPI: 33 ML/MIN/1.73/M2
GLUCOSE SERPL-MCNC: 152 MG/DL (ref 70–110)
HCT VFR BLD AUTO: 46.8 % (ref 40–54)
HGB BLD-MCNC: 15.6 GM/DL (ref 14–18)
IMM GRANULOCYTES # BLD AUTO: 0.07 K/UL (ref 0–0.04)
IMM GRANULOCYTES NFR BLD AUTO: 1.3 % (ref 0–0.5)
LYMPHOCYTES # BLD AUTO: 0.97 K/UL (ref 1–4.8)
MCH RBC QN AUTO: 29.8 PG (ref 27–31)
MCHC RBC AUTO-ENTMCNC: 33.3 G/DL (ref 32–36)
MCV RBC AUTO: 89 FL (ref 82–98)
MICROALBUMIN UR-MCNC: 290 UG/ML (ref ?–5000)
NUCLEATED RBC (/100WBC) (OHS): 0 /100 WBC
PHOSPHATE SERPL-MCNC: 4.2 MG/DL (ref 2.7–4.5)
PLATELET # BLD AUTO: 165 K/UL (ref 150–450)
PMV BLD AUTO: 10.8 FL (ref 9.2–12.9)
POTASSIUM SERPL-SCNC: 4 MMOL/L (ref 3.5–5.1)
PROT UR-MCNC: 95 MG/DL
PROT/CREAT UR: 0.48 MG/G{CREAT}
PTH-INTACT SERPL-MCNC: 86.8 PG/ML (ref 9–77)
RBC # BLD AUTO: 5.24 M/UL (ref 4.6–6.2)
RELATIVE EOSINOPHIL (OHS): 1.8 %
RELATIVE LYMPHOCYTE (OHS): 17.8 % (ref 18–48)
RELATIVE MONOCYTE (OHS): 10.3 % (ref 4–15)
RELATIVE NEUTROPHIL (OHS): 67.7 % (ref 38–73)
SODIUM SERPL-SCNC: 133 MMOL/L (ref 136–145)
WBC # BLD AUTO: 5.44 K/UL (ref 3.9–12.7)

## 2025-05-06 PROCEDURE — 99999 PR PBB SHADOW E&M-EST. PATIENT-LVL III: CPT | Mod: PBBFAC,,, | Performed by: PHYSICIAN ASSISTANT

## 2025-05-06 PROCEDURE — 82043 UR ALBUMIN QUANTITATIVE: CPT | Mod: HCNC

## 2025-05-06 PROCEDURE — 36415 COLL VENOUS BLD VENIPUNCTURE: CPT | Mod: HCNC,PO

## 2025-05-06 PROCEDURE — 80069 RENAL FUNCTION PANEL: CPT | Mod: HCNC

## 2025-05-06 PROCEDURE — 3288F FALL RISK ASSESSMENT DOCD: CPT | Mod: CPTII,S$GLB,, | Performed by: PHYSICIAN ASSISTANT

## 2025-05-06 PROCEDURE — 85025 COMPLETE CBC W/AUTO DIFF WBC: CPT | Mod: HCNC

## 2025-05-06 PROCEDURE — 1100F PTFALLS ASSESS-DOCD GE2>/YR: CPT | Mod: CPTII,S$GLB,, | Performed by: PHYSICIAN ASSISTANT

## 2025-05-06 PROCEDURE — 82570 ASSAY OF URINE CREATININE: CPT | Mod: HCNC

## 2025-05-06 PROCEDURE — 83970 ASSAY OF PARATHORMONE: CPT | Mod: HCNC

## 2025-05-06 PROCEDURE — 99214 OFFICE O/P EST MOD 30 MIN: CPT | Mod: S$GLB,,, | Performed by: PHYSICIAN ASSISTANT

## 2025-05-06 PROCEDURE — 1126F AMNT PAIN NOTED NONE PRSNT: CPT | Mod: CPTII,S$GLB,, | Performed by: PHYSICIAN ASSISTANT

## 2025-05-06 NOTE — PROGRESS NOTES
[facility]  Infectious Disease        Patient ID: Nate Bah is a 79 y.o. male.    Chief Complaint: Follow-up      Nate was seen today for follow-up.    Diagnoses and all orders for this visit:    Discitis of thoracic region    Receiving intravenous antibiotic treatment as outpatient         32 minutes was spent on this encounter, which included: review of recent encounters, review and interpretation of labs/images, obtaining pertinent history, performing a physical examination, counseling and educating the patient/family/caregiver, ordering medications/tests, documenting in the electronic health record, and coordinating care with necessary providers.    Interval HPI:    5/6/25 - ID clinic f/u, accompanied by his daughter. Patient states he has overall been feeling better since discharge home in regards to his back pain. Still undergoing evaluation for prostate cancer with Dr. Julian. He has continued to wear a back brace but reports pain is mostly evident with more activity. He does c/o some RUE discomfort related to his PICC line but was recently evaluated in the ED and there was no malposition or thrombus. Has been infusing without difficulty. Daughter has been helping with his IV antibiotics and he has been compliant and tolerating.    Assessment and Plan:     # Suspected T8 diskitis/ osteomyelitis  - Bcx: Negative  - MRI: T8 vertebral body fracture with apparent extension to involve the pedicles and posterior elements.  Discitis osteomyelitis with pathologic fracture is not excluded  - Patient came to the hospital with subjective fevers, lactic acidosis, leukocytosis, elevated procalcitonin and CRP.   In essence patient behaves in the way of having an active infection  - Extensive workup only positive for  T8  enhancement concern for fracture and possible infection with diskitis.  - since he had been on IV Zosyn for several days, it was felt that biopsy would be less helpful  for ruling out infection, and likely would not . Empiric treatment was recommended instead  - discharged home on empiric IV Daptomycin + IV Ceftriaxone x6 weeks (EOC 5/6/25) for empiric treatment of possible diskitis/osteomyelitis of unclear microbiology         Recommendations:  - labs reviewed. CRP remains elevated but downtrending. CBC without leukocytosis  - he has completed 6 weeks of IV Dapto/CTX for empiric treatment of possible diskitis/osteomyelitis of unclear microbiology  - PICC line pulled today from right antecubital after sterile site prepped per protocol. 49cm intact catheter removed without complication. PICC catheter tip visualized and intact. Pressure dressing applied. Patient tolerated well.  - recommend observation off of antibiotics  - if he has new, worsening, or persistent pain now that antibiotics are completed, recommend he contact NSGY or PCP for eval and possible repeat MRI  - may f/u with ID as needed      Shira Barnett PA-C  Infectious Diseases  [facility]          HPI:       This is a 79-year-old man with previous medical history of CAD, bladder cancer  for which patient is following Dr. Carlisle, urology.      Patient states that the same day of admission presented with profound malaise associated with subjective fevers.  EMS was called and noted T-max 100.6°.    During initial evaluation in the ED patient was stable, tachycardic, T-max 99.5°.  Normotensive.  Evidence of leukocytosis WBC: 13.6.  Noted lactic acidosis elevated CRP and procalcitonin.    Patient is started empirically on IV Zosyn and multiple tests were requested.    CT abdomen/pelvis and MRI of the lumbar spine with evidence of TA compression fracture with possible diskitis and osteomyelitis   Interventional radiology as well as Neurosurgery were consulted     Concern for diskitis Infectious Disease was consulted      Past Medical History:   Diagnosis Date    JACKSON (acute kidney  injury) 12/04/2016    Anticoagulant long-term use     BPH (benign prostatic hyperplasia)     Cancer     skin cancer to arms; bladder cancer    Coronary artery disease     afib, stents, pacemaker watchman  stents x 5    Diabetes mellitus     Edema     Elevated PSA     Esophagus disorder     esophagus spasms    GERD without esophagitis     Gout     Gross hematuria 07/30/2024    Hypertension     Mixed hyperlipidemia     Obesity (BMI 30-39.9)     Paroxysmal atrial fibrillation     Salmonella gastroenteritis 01/17/2023    Sleep apnea     no Cpap       Past Surgical History:   Procedure Laterality Date    ANGIOGRAM, CORONARY, WITH LEFT HEART CATHETERIZATION Left 05/03/2021    Procedure: Angiogram, Coronary, with Left Heart Cath;  Surgeon: Flakito Winter MD;  Location: [facility] CATH;  Service: Cardiology;  Laterality: Left;    BLADDER FULGURATION N/A 07/31/2024    Procedure: FULGURATION, BLADDER;  Surgeon: Lazaro Julian MD;  Location: [facility] OR;  Service: Urology;  Laterality: N/A;    CARDIAC SURGERY  2003    CABG 1 vessel    CHOLECYSTECTOMY      CLOSURE OF LEFT ATRIAL APPENDAGE USING DEVICE  02/28/2024    Procedure: Watchman;  Surgeon: Fritz Del Valle MD;  Location: [facility] CATH;  Service: Cardiology;;    COLON SURGERY  2007?    resection /perforated colon    COLONOSCOPY N/A 1/16/2025    Procedure: COLONOSCOPY;  Surgeon: Mayank Olmstead Jr., MD;  Location: [facility] ENDO;  Service: Endoscopy;  Laterality: N/A;    CORONARY ANGIOGRAPHY N/A 07/24/2018    Procedure: Coronary angiography;  Surgeon: Italo Mckeon MD;  Location: [facility] CATH;  Service: Cardiology;  Laterality: N/A;    CORONARY ANGIOPLASTY WITH STENT PLACEMENT      5 stents, last one 2015    CORONARY STENT PLACEMENT N/A 07/24/2018    Procedure: INSERTION, STENT, CORONARY ARTERY;  Surgeon: Italo Mckeon MD;  Location: [facility] CATH;  Service: Cardiology;  Laterality: N/A;    CYSTOSCOPY N/A 07/31/2024    Procedure: CYSTOSCOPY;  Surgeon: Lazaro Julian MD;  Location: [facility] OR;  Service:  Urology;  Laterality: N/A;    CYSTOSCOPY W/ RETROGRADES Right 07/10/2024    Procedure: CYSTOSCOPY, WITH RETROGRADE PYELOGRAM;  Surgeon: Lazaro Julian MD;  Location: [facility] OR;  Service: Urology;  Laterality: Right;    ECHOCARDIOGRAM,TRANSESOPHAGEAL  02/28/2024    Procedure: (TESFAYE) intra-procedure;  Surgeon: Adalgisa Goodson MD;  Location: [facility] CATH;  Service: Cardiology;;    ECHOCARDIOGRAM,TRANSESOPHAGEAL N/A 04/01/2024    Procedure: Transesophageal echo (TESFAYE) intra-procedure log documentation;  Surgeon: Flakito Winter MD;  Location: [facility];  Service: Cardiology;  Laterality: N/A;  6 week post-implant TESFAYE for Watchman    ECHOCARDIOGRAM,TRANSESOPHAGEAL N/A 10/14/2024    Procedure: Transesophageal echo (TESFAYE) intra-procedure log documentation;  Surgeon: Flakito Winter MD;  Location: [facility];  Service: Cardiology;  Laterality: N/A;  6 month post-implant TESFAYE for Watchman    ESOPHAGEAL DILATION N/A 1/16/2025    Procedure: DILATION, ESOPHAGUS;  Surgeon: Mayank Olmstead Jr., MD;  Location: [facility];  Service: Endoscopy;  Laterality: N/A;    ESOPHAGOGASTRODUODENOSCOPY N/A 1/16/2025    Procedure: EGD (ESOPHAGOGASTRODUODENOSCOPY);  Surgeon: Mayank Olmstead Jr., MD;  Location: [facility];  Service: Endoscopy;  Laterality: N/A;    EYE SURGERY      cataracts    FOOT SURGERY Right     with hardware    HEMORRHOID SURGERY      INCISION OF PERIRECTAL ABSCESS N/A 03/25/2020    Procedure: INCISION, ABSCESS, PERIRECTAL;  Surgeon: Nayan Mack MD;  Location: [facility] OR;  Service: General;  Laterality: N/A;    INSERTION OF PACEMAKER Left 05/04/2021    Procedure: INSERTION, PACEMAKER;  Surgeon: Flakito Winter MD;  Location: [facility] CATH;  Service: Cardiology;  Laterality: Left;    INSTILLATION OF URINARY BLADDER N/A 07/10/2024    Procedure: INSTILLATION, BLADDER;  Surgeon: Lazaro Julian MD;  Location: [facility] OR;  Service: Urology;  Laterality: N/A;  Gemcitabine    JOINT REPLACEMENT Left     knee    KNEE ARTHROSCOPY W/ MENISCAL  REPAIR Right     KYPHOSIS SURGERY      LEFT HEART CATHETERIZATION N/A 07/24/2018    Procedure: Left heart cath;  Surgeon: Italo Mckeon MD;  Location: [facility] CATH;  Service: Cardiology;  Laterality: N/A;    PROSTATE BIOPSY N/A 5/2/2025    Procedure: BIOPSY, TRANSPERINEAL PROSTATE;  Surgeon: Lazaro Julian MD;  Location: [facility] OR;  Service: Urology;  Laterality: N/A;  TRANSPERINEAL    REVISION OF PROCEDURE INVOLVING PACEMAKER LEAD  11/11/2024    Procedure: Lead Revision (Gee);  Surgeon: Flakito Winter MD;  Location: [facility] CATH;  Service: Cardiology;;    TRANSURETHRAL RESECTION OF BLADDER TUMOR BY BIPOLAR CAUTERY N/A 07/10/2024    Procedure: TURBT, USING BIPOLAR CAUTERY;  Surgeon: Lazaro Julian MD;  Location: [facility] OR;  Service: Urology;  Laterality: N/A;       Family History   Problem Relation Name Age of Onset    Pancreatic cancer Mother      Heart disease Father      No Known Problems Brother         Social History     Socioeconomic History    Marital status:    Tobacco Use    Smoking status: Former     Current packs/day: 0.00     Average packs/day: 1 pack/day for 25.0 years (25.0 ttl pk-yrs)     Types: Cigarettes     Start date: 1985     Quit date: 2010     Years since quitting: 15.3    Smokeless tobacco: Never   Substance and Sexual Activity    Alcohol use: Not Currently     Comment: occasional    Drug use: No    Sexual activity: Yes     Partners: Female     Social Drivers of Health     Financial Resource Strain: Low Risk  (3/26/2025)    Overall Financial Resource Strain (CARDIA)     Difficulty of Paying Living Expenses: Not hard at all   Food Insecurity: No Food Insecurity (3/26/2025)    Hunger Vital Sign     Worried About Running Out of Food in the Last Year: Never true     Ran Out of Food in the Last Year: Never true   Transportation Needs: No Transportation Needs (3/31/2025)    OASIS : Transportation     Lack of Transportation (Medical): No     Lack of Transportation (Non-Medical): No      Patient Unable or Declines to Respond: No   Physical Activity: Unknown (5/27/2024)    Exercise Vital Sign     Days of Exercise per Week: 0 days   Stress: No Stress Concern Present (3/26/2025)    [employer] - Occupational Stress Questionnaire     Feeling of Stress : Not at all   Housing Stability: Low Risk  (3/26/2025)    Housing Stability Vital Sign     Unable to Pay for Housing in the Last Year: No     Number of Times Moved in the Last Year: 0     Homeless in the Last Year: No       Review of patient's allergies indicates:   Allergen Reactions    Ace inhibitors      cough       Current Outpatient Medications   Medication Instructions    acetaminophen (TYLENOL) 1,000 mg, As needed (PRN)    allopurinoL (ZYLOPRIM) 300 mg, Oral    ascorbic acid (VITAMIN C ORAL) 1 tablet, Nightly    aspirin (ECOTRIN) 81 mg, Oral, Daily    CHOLECALCIFEROL, VITAMIN D3, ORAL 1 capsule, Nightly    D5W PgBk 100 mL with cefTRIAXone 2 gram SolR 2 g 2 g, Intravenous, Daily    DAPTOmycin 50 mg/mL in 0.9% NaCl solution 800 mg, Intravenous, Daily    docusate sodium (COLACE) 100 mg, 2 times daily    furosemide (LASIX) 40 mg, Oral    glipiZIDE (GLUCOTROL) 5 mg, Oral, With breakfast    heparin sod,porcine/0.9 % NaCl (HEPARIN FLUSH IV) 5 mLs, Daily    isosorbide mononitrate (IMDUR) 60 mg, Oral, Nightly    losartan (COZAAR) 100 mg, Oral, Daily    magnesium oxide (MAG-OX) 400 mg, Nightly    metOLazone (ZAROXOLYN) 5 mg, Every Saturday    pantoprazole (PROTONIX) 40 mg, Oral    rosuvastatin (CRESTOR) 10 mg, Oral, Nightly    sodium chloride 0.9% (NORMAL SALINE FLUSH) injection 10 mLs, Daily    tamsulosin (FLOMAX) 0.4 mg, Oral, Nightly         Review of Systems   Constitutional:  Negative for activity change, appetite change, chills, fatigue and fever.   HENT:  Negative for congestion, mouth sores, sinus pain and sore throat.    Respiratory:  Negative for cough, chest tightness, shortness of breath and wheezing.     Cardiovascular:  Negative for chest pain, palpitations and leg swelling.   Gastrointestinal:  Negative for abdominal pain, diarrhea, nausea and vomiting.   Genitourinary:  Positive for hematuria. Negative for dysuria, flank pain and urgency.   Musculoskeletal:  Positive for back pain. Negative for arthralgias, myalgias, neck pain and neck stiffness.   Skin:  Negative for color change and rash.   Neurological:  Negative for dizziness, seizures and headaches.   Psychiatric/Behavioral:  Negative for confusion.            Objective:     Vitals:    05/06/25 1049   Resp: 20   Temp: 97.6 °F (36.4 °C)              Physical Exam  Vitals and nursing note reviewed.   Constitutional:       General: He is awake. He is not in acute distress.     Appearance: He is well-developed and well-groomed. He is not diaphoretic.   HENT:      Head: Normocephalic and atraumatic.      Right Ear: External ear normal.      Left Ear: External ear normal.      Nose: Nose normal.   Eyes:      General: No scleral icterus.        Right eye: No discharge.         Left eye: No discharge.      Pupils: Pupils are equal, round, and reactive to light.   Cardiovascular:      Rate and Rhythm: Normal rate and regular rhythm.   Pulmonary:      Effort: Pulmonary effort is normal. No accessory muscle usage.   Abdominal:      General: There is no distension.      Tenderness: There is no guarding.   Musculoskeletal:      Cervical back: Normal range of motion and neck supple.      Comments: TLSO brace in place   Skin:     General: Skin is warm and dry.   Neurological:      General: No focal deficit present.      Mental Status: He is alert and oriented to person, place, and time.   Psychiatric:         Mood and Affect: Mood normal.         Behavior: Behavior normal.           CrCl cannot be calculated (Unknown ideal weight.).      Microbiology Results (last 7 days)       ** No results found for the last 168 hours. **              Significant Labs: All pertinent  labs within the past 24 hours have been reviewed.     Significant Imaging: I have reviewed all relevant and available imaging results/findings within the past 24 hours.      Plan -- see top of note

## 2025-05-06 NOTE — TELEPHONE ENCOUNTER
Pt seen in ID clinic today by Shira Barnett PA-C, PICC line d/c'd.  Notified [facility] nurses Aiyana DIMAS RN and Reed ARNETT RN via [place].  Called [facility], spoke with Alesha RN, notified of above, Reed Kwan RN reponded.    D/C IV abx and labs as ordered

## 2025-05-08 ENCOUNTER — PATIENT MESSAGE (OUTPATIENT)
Dept: UROLOGY | Facility: CLINIC | Age: 80
End: 2025-05-08
Payer: MEDICARE

## 2025-05-12 ENCOUNTER — RESULTS FOLLOW-UP (OUTPATIENT)
Dept: UROLOGY | Facility: CLINIC | Age: 80
End: 2025-05-12

## 2025-05-12 ENCOUNTER — PATIENT MESSAGE (OUTPATIENT)
Dept: HEMATOLOGY/ONCOLOGY | Facility: CLINIC | Age: 80
End: 2025-05-12
Payer: MEDICARE

## 2025-05-12 ENCOUNTER — TELEPHONE (OUTPATIENT)
Dept: HEMATOLOGY/ONCOLOGY | Facility: CLINIC | Age: 80
End: 2025-05-12
Payer: MEDICARE

## 2025-05-12 DIAGNOSIS — C61 PROSTATE CANCER: Primary | ICD-10-CM

## 2025-05-12 NOTE — NURSING
Chart reviewed. Most recent PSA is 9.4ng/ml. [facility] pending [facility]psma results.     Of note, Mr. Bah has hx skin cancer and bladder cancer. Bladder cancer treated with BCG and is on surveillance.     Called Mr. Bah - spoke with daughter, Maye. Maye schedules all medical visits for her father and his his primary contact.     Introduced myself and explained my role as oncology navigator. Discussed purpose of PETpsma and role in treatment planning. Discussed expected duration of PET scan. Scheduled imaging on 5/22. Reviewed date/time/location of this visit.     [facility] physicians Mr. Sullivan will meet. Scheduled visits with Dr. Julian and Dr. Stanley on Friday, 5/23. Reviewed date/time/location of this visit. Discussed anticipated duration of this apt.     My contact information provided. Encouraged Maye to contact me with any questions or concerns moving forward. She v/u and thanked me for my call.     Added self to care team. Brendon tool updated. Will follow for [facility] navigation needs.     Oncology Navigation   Intake  Date of Diagnosis: 05/08/25  Cancer Type:   Type of Referral: Internal  Date of Referral: 05/12/25  Initial Nurse Navigator Contact: 05/12/25  Referral to Initial Contact Timeline (days): 0  First Appointment Available: 05/23/25  Appointment Date: 05/23/25  First Available Date vs. Scheduled Date (days): 0  Multiple appointments: Yes  Reason if booked > 7 days after scheduling: Specific provider / access  Reason for Treatment Delay: Further work-up     Treatment  Current Status: Active    Surgical Oncologist: Dr. Lazaro Julian    Radiation Oncologist: Dr. Raven Stanley    Procedures: Biopsy; PET scan  Biopsy Schedule Date: 05/02/25  PET Scan Schedule Date: 05/22/25    Radiation Oncologist: Dr. Raven Stanley    Support Systems: Children  Barriers of Care: Comorbidities  Comorbidities: Hx DMT2, CAD with pacemaker,  PVD, stage 3 CKD  Concerns: New dx high risk prostate cancer, hx bladder cancer treated wtih BCG, hx skin cancer     Acuity  Systemic Treatment - predicted or initiated: More than one treatment modality concurrently (chemotherapy, radiation, etc.) (+2)  Treatment Tolerability: Has not started treatment yet/treatment fully completed and side effects resolved  Comorbidities in Medical History: 2  Hospitalization Within the Past Month: 1   Needed: 0  Support: 0  Verbalizes Financial Concerns: 0  Transportation: 0  History of noncompliance/frequent no shows and cancellations: 0  Verbalizes the need for more education: 1  Navigation Acuity: 4     Follow Up  Follow up in about 11 days (around 5/23/2025).

## 2025-05-12 NOTE — TELEPHONE ENCOUNTER
----- Message from Lazaro Julian MD sent at 5/12/2025  1:12 PM CDT -----  Discussed pathology results with the patient. High Risk prostate. Please arrange a PSMA PET scan and follow up in [facility] clinic with rad/onc and me.   ----- Message -----  From: Interface, Lab In [facility]  Sent: 5/8/2025   1:40 PM CDT  To: Lazaro Julian MD

## 2025-05-13 ENCOUNTER — OFFICE VISIT (OUTPATIENT)
Dept: NEPHROLOGY | Facility: CLINIC | Age: 80
End: 2025-05-13
Payer: MEDICARE

## 2025-05-13 VITALS
DIASTOLIC BLOOD PRESSURE: 58 MMHG | SYSTOLIC BLOOD PRESSURE: 118 MMHG | OXYGEN SATURATION: 99 % | WEIGHT: 281.94 LBS | HEART RATE: 58 BPM | BODY MASS INDEX: 36.18 KG/M2 | HEIGHT: 74 IN

## 2025-05-13 DIAGNOSIS — I25.10 CORONARY ARTERY DISEASE DUE TO LIPID RICH PLAQUE: ICD-10-CM

## 2025-05-13 DIAGNOSIS — E66.812 CLASS 2 SEVERE OBESITY DUE TO EXCESS CALORIES WITH SERIOUS COMORBIDITY AND BODY MASS INDEX (BMI) OF 36.0 TO 36.9 IN ADULT: ICD-10-CM

## 2025-05-13 DIAGNOSIS — E78.2 MIXED HYPERLIPIDEMIA: ICD-10-CM

## 2025-05-13 DIAGNOSIS — E66.01 CLASS 2 SEVERE OBESITY DUE TO EXCESS CALORIES WITH SERIOUS COMORBIDITY AND BODY MASS INDEX (BMI) OF 36.0 TO 36.9 IN ADULT: ICD-10-CM

## 2025-05-13 DIAGNOSIS — I25.83 CORONARY ARTERY DISEASE DUE TO LIPID RICH PLAQUE: ICD-10-CM

## 2025-05-13 DIAGNOSIS — E11.42 DIABETIC POLYNEUROPATHY ASSOCIATED WITH TYPE 2 DIABETES MELLITUS: ICD-10-CM

## 2025-05-13 DIAGNOSIS — I10 ESSENTIAL HYPERTENSION: ICD-10-CM

## 2025-05-13 DIAGNOSIS — C61 ADENOCARCINOMA OF PROSTATE: ICD-10-CM

## 2025-05-13 DIAGNOSIS — N18.31 STAGE 3A CHRONIC KIDNEY DISEASE: Primary | ICD-10-CM

## 2025-05-13 DIAGNOSIS — C67.8 MALIGNANT NEOPLASM OF OVERLAPPING SITES OF BLADDER: ICD-10-CM

## 2025-05-13 PROCEDURE — 99999 PR PBB SHADOW E&M-EST. PATIENT-LVL III: CPT | Mod: PBBFAC,,, | Performed by: STUDENT IN AN ORGANIZED HEALTH CARE EDUCATION/TRAINING PROGRAM

## 2025-05-13 RX ORDER — METOLAZONE 5 MG/1
5 TABLET ORAL
Qty: 12 TABLET | Refills: 3 | Status: SHIPPED | OUTPATIENT
Start: 2025-05-13 | End: 2026-05-08

## 2025-05-13 NOTE — PROGRESS NOTES
[facility]  Nephrology Clinic    Subjective:       HPI ID: Nate Bah is a 79 y.o. male who returns for ongoing evaluation and management of CKD.   He is pertinent ongoing medical conditions of hyperuricemia with gout history, GERD, mixed hyperlipidemia, hypertension dx nearly 50 years ago, dm type II without long term use of insulin dx 5 yr ago, paroxysmal atrial fibrillation, BPH, coronary artery disease status post stents, status post Watchman, and recent hospitalization with diagnosis of bladder cancer.  He has started BCG instillations in the interim with Urology followed by Dr. Julian.  Referred to Nephrology Clinic for evaluation into chronic kidney disease.  We reviewed his most recent metabolic panel from 09/06/2024 featuring a serum creatinine of 1.5 mg/dL, which appears consistent with his baseline of 1.2-1.5 over the last 2 years.  A contemporary urinalysis from the same day was bland.    In terms of his renal history, he denies hospitalizations for prior JACKSON requiring RRT. Denies history of nephrolithiasis episodes. No reported history of frequent or recurrent use of NSAIDs/COXI. No pertinent rheumatologic or AI disease history. Denies any pertinent family history of known kidney disease, or family members diagnosed with ESRD requiring dialysis.  Smoking Hx: quit almost 50 years ago  Other pertinent urologic history: see above  Fluid intake per 24hr: roughly 100oz of water.     Interval history:  1/6/25 - Seen today for f/u evaluation. Feels well and denies acute complaints. Reports uneventful NAVA.    5/13/25 - here today for CKD f/u. Underwent prostate needle biopsy on 5/2/25. Path revealed prostatic adenocarcinoma. Reports having hematuria post procedure. Completed labs on 5/5/25 w/ sCr of 1.4mg/dL (baseline). Next day completed metabolic panel with sCr of 2.0mg/dL, suggesting JACKSON. Undergoing PET soon. Repeating labs soon. He reports feeling well and denies uremic symptoms.  He reports he still had hematuria on the day he did his recent labs and urine studies      Review of Systems   Constitutional:  Negative for chills, diaphoresis and fever.   Respiratory:  Negative for cough and shortness of breath.    Cardiovascular:  Negative for chest pain and leg swelling.   Gastrointestinal:  Negative for abdominal pain, diarrhea, nausea and vomiting.   Genitourinary:  Positive for hematuria. Negative for difficulty urinating and dysuria.   Musculoskeletal:  Positive for back pain. Negative for myalgias.   Neurological:  Negative for headaches.   Hematological:  Does not bruise/bleed easily.       The past medical, family and social histories were reviewed for this encounter.     Past Medical History:   Diagnosis Date    JACKSON (acute kidney injury) 12/04/2016    Anticoagulant long-term use     BPH (benign prostatic hyperplasia)     Cancer     skin cancer to arms; bladder cancer    Coronary artery disease     afib, stents, pacemaker watchman  stents x 5    Diabetes mellitus     Edema     Elevated PSA     Esophagus disorder     esophagus spasms    GERD without esophagitis     Gout     Gross hematuria 07/30/2024    Hypertension     Mixed hyperlipidemia     Obesity (BMI 30-39.9)     Paroxysmal atrial fibrillation     Salmonella gastroenteritis 01/17/2023    Sleep apnea     no Cpap       Current Outpatient Medications   Medication Sig    acetaminophen (TYLENOL) 500 MG tablet Take 1,000 mg by mouth as needed for Pain. Indications: pain    allopurinoL (ZYLOPRIM) 300 MG tablet TAKE 1 TABLET EVERY DAY (Patient taking differently: Take 300 mg by mouth once daily.)    ascorbic acid (VITAMIN C ORAL) Take 1 tablet by mouth every evening.    CHOLECALCIFEROL, VITAMIN D3, ORAL Take 1 capsule by mouth every evening. Indications: supplement    docusate sodium (COLACE) 100 MG capsule Take 100 mg by mouth 2 (two) times daily.    furosemide (LASIX) 40 MG tablet TAKE 1 TABLET ONE TIME DAILY (Patient taking differently:  "Take 40 mg by mouth once daily.)    glipiZIDE (GLUCOTROL) 5 MG tablet Take 1 tablet (5 mg total) by mouth daily with breakfast.    isosorbide mononitrate (IMDUR) 60 MG 24 hr tablet Take 1 tablet (60 mg total) by mouth every evening.    losartan (COZAAR) 100 MG tablet Take 1 tablet (100 mg total) by mouth once daily.    magnesium oxide (MAG-OX) 400 mg (241.3 mg magnesium) tablet Take 400 mg by mouth every evening.    pantoprazole (PROTONIX) 40 MG tablet TAKE 1 TABLET ONE TIME DAILY (Patient taking differently: Take 40 mg by mouth once daily.)    rosuvastatin (CRESTOR) 10 MG tablet TAKE 1 TABLET EVERY EVENING (Patient taking differently: Take 10 mg by mouth nightly.)    tamsulosin (FLOMAX) 0.4 mg Cap Take 1 capsule (0.4 mg total) by mouth every evening.    aspirin (ECOTRIN) 81 MG EC tablet Take 1 tablet (81 mg total) by mouth once daily. (Patient not taking: Reported on 5/13/2025)    metOLazone (ZAROXOLYN) 5 MG tablet Take 1 tablet (5 mg total) by mouth every Saturday.     No current facility-administered medications for this visit.     Objective:   BP (!) 118/58 (BP Location: Right arm, Patient Position: Sitting)   Pulse (!) 58   Ht 6' 2" (1.88 m)   Wt 127.9 kg (281 lb 15.5 oz)   SpO2 99%   BMI 36.20 kg/m²      Physical Exam  Vitals reviewed.   Constitutional:       General: He is not in acute distress.     Appearance: He is obese.   Cardiovascular:      Pulses: Normal pulses.      Heart sounds: Normal heart sounds.      No friction rub.   Pulmonary:      Effort: Pulmonary effort is normal. No respiratory distress.      Breath sounds: Normal breath sounds.   Abdominal:      General: Abdomen is protuberant. There is no distension.      Palpations: Abdomen is soft.   Musculoskeletal:      Comments: Back brace   Neurological:      Mental Status: He is oriented to person, place, and time.      Motor: No weakness.   Psychiatric:         Mood and Affect: Mood normal.         Behavior: Behavior normal.       "   Assessment:     Lab Results   Component Value Date    CREATININE 2.0 (H) 05/06/2025    BUN 32 (H) 05/06/2025     (L) 05/06/2025    K 4.0 05/06/2025    CL 95 05/06/2025    CO2 28 05/06/2025     Lab Results   Component Value Date    PTH 86.8 (H) 05/06/2025    CALCIUM 9.7 05/06/2025    PHOS 4.2 05/06/2025     Lab Results   Component Value Date    HCT 46.8 05/06/2025     Urine Protein/Creatinine Ratio   Date Value Ref Range Status   05/06/2025 0.48 (H) <=0.20 Final     Prot/Creat Ratio, Urine   Date Value Ref Range Status   01/06/2025 0.08 0.00 - 0.20 Final   01/20/2023 215.1 (H) 15.0 - 68.0 mg/g Final       Lab Results   Component Value Date    MICALBCREAT 147.2 (H) 05/06/2025    MICALBCREAT 17.3 01/06/2025    MICALBCREAT Unable to calculate 03/21/2024    MICALBCREAT 11.2 01/20/2023    MICALBCREAT Unable to calculate 09/14/2022    MICALBCREAT Unable to calculate 03/03/2020           1. Stage 3a chronic kidney disease    2. Diabetic polyneuropathy associated with type 2 diabetes mellitus    3. Coronary artery disease due to lipid rich plaque    4. Essential hypertension    5. Mixed hyperlipidemia    6. Malignant neoplasm of overlapping sites of bladder    7. Class 2 severe obesity due to excess calories with serious comorbidity and body mass index (BMI) of 36.0 to 36.9 in adult    8. Adenocarcinoma of prostate            Plan:   Return to clinic in 3 months  Labs for next visit include rfp, uacr, upcr, ua, pth, cbc.  Baseline creatinine is 1.2-1.5mg/dL since 2022.    CKD G3a/ A1 PLAN  -risk:  Age, longstanding multiyear hypertension, recent diabetes though controlled, cardiovascular disease, prostate/bladder disease  -on Max RASI for CKD-MACE risk reduction, proteinuria reduction and reynold-protection  -normally low risk of progression based on KFR model: 0.21% risk at 5 years  -labs in setting of mild insufficiency. Repeat this week to establish resolution.    Coronary artery disease/paroxysmal atrial  fibrillation-per Cardiology     Hypertension-blood pressure trends reviewed.  Medication list reviewed.  Continue current medications, including ARB at this time.      Mixed hyperlipidemia-remains on rosuvastatin-discussed Mediterranean diet again with the patient today.    Severe obesity elevated BMI-discuss diet and exercise.  He is actively losing weight currently with intent to lose more weight.  He is still under 300lbs.     BPH-on Flomax.  Per Urology.    Neoplasm of bladder  Microscopic hematuria  Prostate Adenocarcinoma  -per urology, seeing them on the 23rd. H/o BCG therapy.    MBD evaluation-continue vitamin-D for now, phos okay. PTH mildly elevated in setting of recent decrease in renal fxn. Repeat.     Bilateral lower extremity edema - continue furosemide 40mg QD, continue metolazone 5mg Q 7 days. Edema appears near resolved.     __________________________  Raymundo Coffey MD  [facility] [place]    Part of this note has been created using [employer] dictation system. Errors in transcription may not be completely avoided.    KFRE 2-Year: 3% at 5/6/2025 12:28 PM  Calculated from:  Serum Creatinine: 2 mg/dL at 5/6/2025 11:57 AM  Urine Albumin Creatinine Ratio: 147.2 ug/mg at 5/6/2025 12:28 PM  Age: 79 years  Sex: Male at 5/6/2025 12:28 PM  Has CKD-3 to CKD-5: Yes    KFRE 5-Year: 9.2% at 5/6/2025 12:28 PM  Calculated from:  Serum Creatinine: 2 mg/dL at 5/6/2025 11:57 AM  Urine Albumin Creatinine Ratio: 147.2 ug/mg at 5/6/2025 12:28 PM  Age: 79 years  Sex: Male at 5/6/2025 12:28 PM  Has CKD-3 to CKD-5: Yes

## 2025-05-19 ENCOUNTER — HOSPITAL ENCOUNTER (OUTPATIENT)
Dept: RADIOLOGY | Facility: HOSPITAL | Age: 80
Discharge: HOME | End: 2025-05-19
Attending: PHYSICIAN ASSISTANT
Payer: MEDICARE

## 2025-05-19 ENCOUNTER — OFFICE VISIT (OUTPATIENT)
Dept: NEUROSURGERY | Facility: CLINIC | Age: 80
End: 2025-05-19
Payer: MEDICARE

## 2025-05-19 VITALS
HEIGHT: 74 IN | HEART RATE: 70 BPM | DIASTOLIC BLOOD PRESSURE: 71 MMHG | RESPIRATION RATE: 18 BRPM | SYSTOLIC BLOOD PRESSURE: 109 MMHG | WEIGHT: 281.94 LBS | BODY MASS INDEX: 36.18 KG/M2

## 2025-05-19 DIAGNOSIS — S22.068A OTHER CLOSED FRACTURE OF EIGHTH THORACIC VERTEBRA, INITIAL ENCOUNTER: ICD-10-CM

## 2025-05-19 DIAGNOSIS — S22.068A OTHER CLOSED FRACTURE OF EIGHTH THORACIC VERTEBRA, INITIAL ENCOUNTER: Primary | ICD-10-CM

## 2025-05-19 PROCEDURE — 3078F DIAST BP <80 MM HG: CPT | Mod: CPTII,S$GLB,, | Performed by: NEUROLOGICAL SURGERY

## 2025-05-19 PROCEDURE — 72070 X-RAY EXAM THORAC SPINE 2VWS: CPT | Mod: 26,,, | Performed by: RADIOLOGY

## 2025-05-19 PROCEDURE — 1101F PT FALLS ASSESS-DOCD LE1/YR: CPT | Mod: CPTII,S$GLB,, | Performed by: NEUROLOGICAL SURGERY

## 2025-05-19 PROCEDURE — 1159F MED LIST DOCD IN RCRD: CPT | Mod: CPTII,S$GLB,, | Performed by: NEUROLOGICAL SURGERY

## 2025-05-19 PROCEDURE — 3074F SYST BP LT 130 MM HG: CPT | Mod: CPTII,S$GLB,, | Performed by: NEUROLOGICAL SURGERY

## 2025-05-19 PROCEDURE — 99214 OFFICE O/P EST MOD 30 MIN: CPT | Mod: S$GLB,,, | Performed by: NEUROLOGICAL SURGERY

## 2025-05-19 PROCEDURE — 3288F FALL RISK ASSESSMENT DOCD: CPT | Mod: CPTII,S$GLB,, | Performed by: NEUROLOGICAL SURGERY

## 2025-05-19 PROCEDURE — 72070 X-RAY EXAM THORAC SPINE 2VWS: CPT | Mod: TC,FY,PO

## 2025-05-20 NOTE — PROGRESS NOTES
Neurosurgery History and Physical    Patient ID: Nate Bah is a 79 y.o. male.    Chief Complaint   Patient presents with    Follow-up     4 week f/u with imaging       History of Present Illness    CHIEF COMPLAINT:  Mr. Bah presents for a follow-up visit regarding a thoracic spine fracture and to discuss recent imaging results.    HPI:  Mr. Bah, a man nearing 80 years old, is being seen for follow-up of a thoracic spine fracture. His upper back is not painful, but he has pain rated 2-3/10 when coughing. He has been wearing a back brace consistently since his injury, which occurred approximately 2 months ago. He reports the same level of pain (rated 7/10) whether wearing the brace or not. He feels significantly better overall, estimating an improvement from about 10% to 95% in terms of feeling well, which he attributes to the antibiotics he received during a recent hospital stay.    He has a history of bladder cancer and was recently diagnosed with prostate cancer. He is scheduled for a PET scan on Thursday, followed by appointments with Dr. Laureano and a radiation oncologist on Friday. The prostate cancer diagnosis came after noticing an elevated PSA, initially thought to be due to a prostate infection.    He reports hand cramps, which he has had for about 10 years. He states that sometimes his muscles contract, causing his fingers to curl bilaterally.    His activity level has decreased significantly since his injury. He used to walk a mile twice daily when he had heart problems but has since stopped due to the heat and started using a golf cart instead.    He denies numbness or tingling in his legs. He denies any weakness in his legs that he can perceive, apart from general weakness due to inactivity.    MEDICAL HISTORY:  Mr. Bah has a history of Ankylosing Spondylitis, Stage 4 high-risk aggressive prostate cancer, previously treated bladder cancer, a vertebral fracture in the upper back, and a  heart problem.    SURGICAL HISTORY:  Mr. Bah recently underwent cancer removal surgery over the eye. He also had a recent bladder procedure involving catheters. Previously, he underwent vertebroplasty on the L1 vertebra, which involved cement injection for a compression fracture.    TEST RESULTS:  Mr. Bah's calcium levels were checked a few weeks ago and were found to be fine. Recent labs also came back fine. A blood culture was performed recently and never grew anything. Mr. Bah recently underwent a prostate biopsy, which confirmed the presence of prostate cancer.    IMAGING:  An X-ray of the thoracic spine was performed today, showing a stable fracture with slight angulation causing a kyphotic appearance. There has been no worsening since the previous X-ray. The previous X-ray of the thoracic spine had shown a fracture. During the patient's hospital stay, a CT of the thoracic spine revealed a fracture line going across the vertebra. This fracture was not suitable for cement injection due to the risk of leakage. A recent CT showed prostate cancer, while the bladder appeared fine.    SOCIAL HISTORY:  Occupation: Works, almost 80 years old      ROS:  General: +excessive drowsiness  Respiratory: +cough  Musculoskeletal: +back pain, +muscle weakness, +muscle cramps  Neurological: -numbness           Review of Systems    Past Medical History:   Diagnosis Date    JACKSON (acute kidney injury) 12/04/2016    Anticoagulant long-term use     BPH (benign prostatic hyperplasia)     Cancer     skin cancer to arms; bladder cancer    Coronary artery disease     afib, stents, pacemaker watchman  stents x 5    Diabetes mellitus     Edema     Elevated PSA     Esophagus disorder     esophagus spasms    GERD without esophagitis     Gout     Gross hematuria 07/30/2024    Hypertension     Mixed hyperlipidemia     Obesity (BMI 30-39.9)     Paroxysmal atrial fibrillation     Salmonella gastroenteritis 01/17/2023    Sleep apnea   "   no Cpap     Social History[1]  Family History   Problem Relation Name Age of Onset    Pancreatic cancer Mother      Heart disease Father      No Known Problems Brother       Review of patient's allergies indicates:   Allergen Reactions    Ace inhibitors      cough     Current Medications[2]  Blood pressure 109/71, pulse 70, resp. rate 18, height 6' 2" (1.88 m), weight 127.9 kg (281 lb 15.5 oz).      Neurological Exam  Mental Status  Awake, alert and oriented to person, place and time.    Cranial Nerves  CN VII:  Right: There is no facial weakness.  Left: There is no facial weakness.    Motor                                               Right                     Left  Deltoid                                   5                          5   Biceps                                   5                          5   Triceps                                  5                          5   Wrist flexor                            5                          5   Wrist extensor                       5                          5   Interossei                              5                          5   Iliopsoas                               5                          5   Quadriceps                           5                          5  Ankle dorsiflexor                   5                          5  Ankle plantar flexor              5                           5    Sensory  Light touch is normal in upper and lower extremities.     Reflexes                                            Right                      Left  Biceps                                 2+                         2+  Triceps                                2+                         2+  Hamstring                                                    0  Patellar                                1+                          Achilles                                0                         0    Right pathological reflexes: Krys's absent. Ankle clonus absent.  Left " "pathological reflexes: Krys's absent. Ankle clonus absent.    Gait  Casual gait:  Wearing TLSO .      Physical Exam  Neurological:      Deep Tendon Reflexes:      Reflex Scores:       Tricep reflexes are 2+ on the right side and 2+ on the left side.       Bicep reflexes are 2+ on the right side and 2+ on the left side.       Patellar reflexes are 1+ on the right side.       Achilles reflexes are 0 on the right side and 0 on the left side.      Physical Exam    General: No acute distress. Well-developed. Well-nourished.  Eyes: EOMI. Sclerae anicteric.  HENT: Normocephalic. Atraumatic. Nares patent. Moist oral mucosa.  Ears: Bilateral TMs clear. Bilateral EACs clear.  Cardiovascular: Regular rate. Regular rhythm. No murmurs. No rubs. No gallops. Normal S1, S2.  Respiratory: Normal respiratory effort. Clear to auscultation bilaterally. No rales. No rhonchi. No wheezing.  Abdomen: Soft. Non-tender. Non-distended. Normoactive bowel sounds.  Musculoskeletal: No  obvious deformity. Normal leg lift bilaterally. Normal hand strength and movement bilaterally. Normal arm strength bilaterally. Normal foot movement bilaterally.  Extremities: No lower extremity edema.  Neurological: Alert & oriented x3. No slurred speech. Normal gait.  Psychiatric: Normal mood. Normal affect. Good insight. Good judgment.  Skin: Warm. Dry. No rash.            Vital Signs  Pulse: 70  Resp: 18  BP: 109/71  BP Location: Right arm  Patient Position: Sitting  Height and Weight  Height: 6' 2" (188 cm)  Weight: 127.9 kg (281 lb 15.5 oz)  BSA (Calculated - sq m): 2.58 sq meters  BMI (Calculated): 36.2  Weight in (lb) to have BMI = 25: 194.3]    Provider dictation:  I personally reviewed and interpreted the imaging.     Visit Diagnosis:  Other closed fracture of eighth thoracic vertebra, initial encounter  -     CT Thoracic Spine Without Contrast; Future; Expected date: 05/19/2025        Assessment & Plan    I13.0 Hypertensive heart and chronic kidney " disease with heart failure and stage 1 through stage 4 chronic kidney disease, or unspecified chronic kidney disease  S22.000B Wedge compression fracture of unspecified thoracic vertebra, initial encounter for open fracture  M45.0 Ankylosing spondylitis of multiple sites in spine  C61 Malignant neoplasm of prostate  Z85.51 Personal history of malignant neoplasm of bladder  Z85.828 Personal history of other malignant neoplasm of skin  Z86.79 Personal history of other diseases of the circulatory system    IMPRESSION:  - XRs show stable fracture in upper back with slight angulation, causing mild kyphosis.  - Fracture appears to be healing based on improved pain levels and stable imaging.  - Recommend gradual reduction in brace use due to time elapsed since injury and improved symptoms.  - Advised against cement injection for this fracture due to risk of leakage and potential spinal cord complications.  - Ankylosing Spondylitis typically increases fracture instability risk, but thoracic location and rib cage support likely providing adequate stability.  - Anticipate full healing without surgical intervention, given age and current progress.    HYPERTENSIVE HEART AND CHRONIC KIDNEY DISEASE WITH HEART FAILURE AND STAGE 1 THROUGH STAGE 4 CHRONIC KIDNEY DISEASE, OR UNSPECIFIED CHRONIC KIDNEY DISEASE:  - Monitored patient's blood pressure, which occasionally drops to zero.  - Laboratory results show stable kidney function with normal labs.  - Due to concerns about the impact of frequent injections on renal health, planned to avoid contrast for the upcoming CT to preserve kidney function.    ## THORACIC VERTEBRA FRACTURE:  - X-rays show stable fracture with no progression since previous imaging.  - Mr. Bah reports pain only during coughing, indicating clinical improvement.  - Examination reveals mild kyphosis due to vertebral angulation without worsening.  - Discussed fracture nature and healing expectations with  patient.  - Instructed to wear brace when ambulating outside or for extended walks, but may remove when at home during non-strenuous activities.  - Restrict lifting to less than 5 lbs for 6 weeks.  - Ordered CT of thoracic spine in 6 weeks to assess bone healing with follow-up visit after CT completion.  - Surgical intervention deemed unnecessary as the rib cage provides sufficient spinal stabilization for healing.    ## ANKYLOSING SPONDYLITIS:  - Monitored extensive osteophyte formation connecting vertebrae, which is causing spinal rigidity.  - Evaluated spinal stiffness and determined it results from excessive bone formation rather than disc herniation.  - Clarified the difference between bulging discs and osteophytes related to ankylosing spondylitis.    ## PROSTATE CANCER:  - Rising PSA levels prompted further diagnostic evaluation, resulting in prostate cancer diagnosis.  - Scheduled PET scan to evaluate for metastatic disease.  - Planning radiation therapy as the primary treatment modality.    ## HISTORY OF BLADDER CANCER:  - Current bladder status within normal limits.  - Will continue to monitor.    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    ## CARDIOVASCULAR HEALTH:  - Noted cardiac history which led to implementation of regular exercise regimen.  - Recommend walking and use of sit-down elliptical for leg exercise.    PLAN SUMMARY:  - Restrict lifting to less than 5 lbs for 6 weeks  - Avoid contrast for upcoming CT to preserve kidney function  - Order CT of thoracic spine in 6 weeks to assess bone healing  - Recommend walking and use of sit-down elliptical for leg exercise  - Schedule PET scan to evaluate for metastatic disease  - Plan radiation therapy as primary treatment modality  - Instruct to wear brace when ambulating outside or for extended walks  - Follow-up appointment after CT completion             This note was generated with the assistance  of [employer] technology. Verbal consent was obtained by the patient and accompanying visitor(s) for the recording of patient appointment to facilitate this note. I attest to having reviewed and edited the generated note for accuracy, though some syntax or spelling errors may persist. Please contact the author of this note for any clarification.           [1]   Social History  Socioeconomic History    Marital status:    Tobacco Use    Smoking status: Former     Current packs/day: 0.00     Average packs/day: 1 pack/day for 25.0 years (25.0 ttl pk-yrs)     Types: Cigarettes     Start date: 1985     Quit date: 2010     Years since quitting: 15.3    Smokeless tobacco: Never   Substance and Sexual Activity    Alcohol use: Not Currently     Comment: occasional    Drug use: No    Sexual activity: Yes     Partners: Female     Social Drivers of Health     Financial Resource Strain: Low Risk  (3/26/2025)    Overall Financial Resource Strain (CARDIA)     Difficulty of Paying Living Expenses: Not hard at all   Food Insecurity: No Food Insecurity (3/26/2025)    Hunger Vital Sign     Worried About Running Out of Food in the Last Year: Never true     Ran Out of Food in the Last Year: Never true   Transportation Needs: No Transportation Needs (5/5/2025)    OASIS : Transportation     Lack of Transportation (Medical): No     Lack of Transportation (Non-Medical): No     Patient Unable or Declines to Respond: No   Physical Activity: Unknown (5/27/2024)    Exercise Vital Sign     Days of Exercise per Week: 0 days   Stress: No Stress Concern Present (3/26/2025)    [employer] - Occupational Stress Questionnaire     Feeling of Stress : Not at all   Housing Stability: Low Risk  (3/26/2025)    Housing Stability Vital Sign     Unable to Pay for Housing in the Last Year: No     Number of Times Moved in the Last Year: 0     Homeless in the Last Year: No   [2]   Current Outpatient Medications:      acetaminophen (TYLENOL) 500 MG tablet, Take 1,000 mg by mouth as needed for Pain. Indications: pain, Disp: , Rfl:     allopurinoL (ZYLOPRIM) 300 MG tablet, TAKE 1 TABLET EVERY DAY (Patient taking differently: Take 300 mg by mouth once daily.), Disp: 90 tablet, Rfl: 3    ascorbic acid (VITAMIN C ORAL), Take 1 tablet by mouth every evening., Disp: , Rfl:     aspirin (ECOTRIN) 81 MG EC tablet, Take 1 tablet (81 mg total) by mouth once daily. (Patient not taking: Reported on 5/13/2025), Disp: , Rfl:     CHOLECALCIFEROL, VITAMIN D3, ORAL, Take 1 capsule by mouth every evening. Indications: supplement, Disp: , Rfl:     docusate sodium (COLACE) 100 MG capsule, Take 100 mg by mouth 2 (two) times daily., Disp: , Rfl:     furosemide (LASIX) 40 MG tablet, TAKE 1 TABLET ONE TIME DAILY (Patient taking differently: Take 40 mg by mouth once daily.), Disp: 90 tablet, Rfl: 3    glipiZIDE (GLUCOTROL) 5 MG tablet, Take 1 tablet (5 mg total) by mouth daily with breakfast., Disp: 90 tablet, Rfl: 3    isosorbide mononitrate (IMDUR) 60 MG 24 hr tablet, Take 1 tablet (60 mg total) by mouth every evening., Disp: 90 tablet, Rfl: 3    losartan (COZAAR) 100 MG tablet, Take 1 tablet (100 mg total) by mouth once daily., Disp: 90 tablet, Rfl: 3    magnesium oxide (MAG-OX) 400 mg (241.3 mg magnesium) tablet, Take 400 mg by mouth every evening., Disp: , Rfl:     metOLazone (ZAROXOLYN) 5 MG tablet, Take 1 tablet (5 mg total) by mouth every Saturday., Disp: 12 tablet, Rfl: 3    pantoprazole (PROTONIX) 40 MG tablet, TAKE 1 TABLET ONE TIME DAILY (Patient taking differently: Take 40 mg by mouth once daily.), Disp: 90 tablet, Rfl: 3    rosuvastatin (CRESTOR) 10 MG tablet, TAKE 1 TABLET EVERY EVENING (Patient taking differently: Take 10 mg by mouth nightly.), Disp: 90 tablet, Rfl: 0    tamsulosin (FLOMAX) 0.4 mg Cap, Take 1 capsule (0.4 mg total) by mouth every evening., Disp: 30 capsule, Rfl: 11

## 2025-05-22 ENCOUNTER — HOSPITAL ENCOUNTER (OUTPATIENT)
Dept: RADIOLOGY | Facility: HOSPITAL | Age: 80
Discharge: HOME | End: 2025-05-22
Attending: STUDENT IN AN ORGANIZED HEALTH CARE EDUCATION/TRAINING PROGRAM
Payer: MEDICARE

## 2025-05-22 DIAGNOSIS — C61 PROSTATE CANCER: ICD-10-CM

## 2025-05-22 PROCEDURE — 78815 PET IMAGE W/CT SKULL-THIGH: CPT | Mod: 26,PI,, | Performed by: RADIOLOGY

## 2025-05-22 PROCEDURE — 78815 PET IMAGE W/CT SKULL-THIGH: CPT | Mod: TC,PN

## 2025-05-22 PROCEDURE — A9596 HC GALLIUM GA-68 GOZETOTIDE, DX (ILLUCCIX), PER 1 MCI: HCPCS | Mod: TB,PN | Performed by: STUDENT IN AN ORGANIZED HEALTH CARE EDUCATION/TRAINING PROGRAM

## 2025-05-22 RX ADMIN — KIT FOR THE PREPARATION OF GALLIUM GA 68 GOZETOTIDE INJECTION 6.1 MILLICURIE: KIT INTRAVENOUS at 12:05

## 2025-05-22 NOTE — PROGRESS NOTES
PET Imaging Questionnaire    Are you a Diabetic? Recent Blood Sugar level? Yes    Are you anemic? Bone Marrow Stimulation Meds? No    Have you had a CT Scan, if so when & where was your last one? Yes -     Have you had a PET Scan, if so when & where was your last one? No    Chemotherapy or currently on Chemotherapy? No    Radiation therapy? No    Surgical History:   Past Surgical History:   Procedure Laterality Date    ANGIOGRAM, CORONARY, WITH LEFT HEART CATHETERIZATION Left 05/03/2021    Procedure: Angiogram, Coronary, with Left Heart Cath;  Surgeon: Flakito Winter MD;  Location: [facility] CATH;  Service: Cardiology;  Laterality: Left;    BLADDER FULGURATION N/A 07/31/2024    Procedure: FULGURATION, BLADDER;  Surgeon: Lazaro Julian MD;  Location: [facility] OR;  Service: Urology;  Laterality: N/A;    CARDIAC SURGERY  2003    CABG 1 vessel    CHOLECYSTECTOMY      CLOSURE OF LEFT ATRIAL APPENDAGE USING DEVICE  02/28/2024    Procedure: Watchman;  Surgeon: Fritz Del Valle MD;  Location: [facility] CATH;  Service: Cardiology;;    COLON SURGERY  2007?    resection /perforated colon    COLONOSCOPY N/A 1/16/2025    Procedure: COLONOSCOPY;  Surgeon: Mayank Olmstead Jr., MD;  Location: [facility] ENDO;  Service: Endoscopy;  Laterality: N/A;    CORONARY ANGIOGRAPHY N/A 07/24/2018    Procedure: Coronary angiography;  Surgeon: Italo Mckeon MD;  Location: [facility] CATH;  Service: Cardiology;  Laterality: N/A;    CORONARY ANGIOPLASTY WITH STENT PLACEMENT      5 stents, last one 2015    CORONARY STENT PLACEMENT N/A 07/24/2018    Procedure: INSERTION, STENT, CORONARY ARTERY;  Surgeon: Italo Mckeon MD;  Location: [facility] CATH;  Service: Cardiology;  Laterality: N/A;    CYSTOSCOPY N/A 07/31/2024    Procedure: CYSTOSCOPY;  Surgeon: Lazaro Julian MD;  Location: [facility] OR;  Service: Urology;  Laterality: N/A;    CYSTOSCOPY W/ RETROGRADES Right 07/10/2024    Procedure: CYSTOSCOPY, WITH RETROGRADE PYELOGRAM;  Surgeon: Lazaro Julian MD;  Location: [facility] OR;  Service:  Urology;  Laterality: Right;    ECHOCARDIOGRAM,TRANSESOPHAGEAL  02/28/2024    Procedure: (TESFAYE) intra-procedure;  Surgeon: Adalgisa Goodson MD;  Location: [facility] CATH;  Service: Cardiology;;    ECHOCARDIOGRAM,TRANSESOPHAGEAL N/A 04/01/2024    Procedure: Transesophageal echo (TESFAYE) intra-procedure log documentation;  Surgeon: Flakito Winter MD;  Location: [facility];  Service: Cardiology;  Laterality: N/A;  6 week post-implant TESFAYE for Watchman    ECHOCARDIOGRAM,TRANSESOPHAGEAL N/A 10/14/2024    Procedure: Transesophageal echo (TESFAYE) intra-procedure log documentation;  Surgeon: Flakito Winter MD;  Location: [facility];  Service: Cardiology;  Laterality: N/A;  6 month post-implant TESFAYE for Watchman    ESOPHAGEAL DILATION N/A 1/16/2025    Procedure: DILATION, ESOPHAGUS;  Surgeon: Mayank Olmstead Jr., MD;  Location: [facility] ENDO;  Service: Endoscopy;  Laterality: N/A;    ESOPHAGOGASTRODUODENOSCOPY N/A 1/16/2025    Procedure: EGD (ESOPHAGOGASTRODUODENOSCOPY);  Surgeon: Mayank Olmstead Jr., MD;  Location: [facility];  Service: Endoscopy;  Laterality: N/A;    EYE SURGERY      cataracts    FOOT SURGERY Right     with hardware    HEMORRHOID SURGERY      INCISION OF PERIRECTAL ABSCESS N/A 03/25/2020    Procedure: INCISION, ABSCESS, PERIRECTAL;  Surgeon: Nayan Mack MD;  Location: [facility];  Service: General;  Laterality: N/A;    INSERTION OF PACEMAKER Left 05/04/2021    Procedure: INSERTION, PACEMAKER;  Surgeon: Flakito Winter MD;  Location: [facility] CATH;  Service: Cardiology;  Laterality: Left;    INSTILLATION OF URINARY BLADDER N/A 07/10/2024    Procedure: INSTILLATION, BLADDER;  Surgeon: Lazaro Julian MD;  Location: [facility] OR;  Service: Urology;  Laterality: N/A;  Gemcitabine    JOINT REPLACEMENT Left     knee    KNEE ARTHROSCOPY W/ MENISCAL REPAIR Right     KYPHOSIS SURGERY      LEFT HEART CATHETERIZATION N/A 07/24/2018    Procedure: Left heart cath;  Surgeon: Italo Mckeon MD;  Location: [facility] CATH;  Service: Cardiology;   Laterality: N/A;    PROSTATE BIOPSY N/A 5/2/2025    Procedure: BIOPSY, TRANSPERINEAL PROSTATE;  Surgeon: Lazaro Julian MD;  Location: [facility] OR;  Service: Urology;  Laterality: N/A;  TRANSPERINEAL    REVISION OF PROCEDURE INVOLVING PACEMAKER LEAD  11/11/2024    Procedure: Lead Revision (Gee);  Surgeon: Flakito Winter MD;  Location: [facility] CATH;  Service: Cardiology;;    TRANSURETHRAL RESECTION OF BLADDER TUMOR BY BIPOLAR CAUTERY N/A 07/10/2024    Procedure: TURBT, USING BIPOLAR CAUTERY;  Surgeon: Lazaro Julian MD;  Location: [facility] OR;  Service: Urology;  Laterality: N/A;        Have you been fasting for at least 6 hours? Yes    Is there any chance you may be pregnant or breastfeeding? No    Assay: 6.2 MCi@:12:44   Injection Site:LT AC    Residual: 0.1 mCi@: 12:48   Technologist: Vladislav Palmer Injected:6.1 mCi

## 2025-05-23 ENCOUNTER — OFFICE VISIT (OUTPATIENT)
Dept: RADIATION ONCOLOGY | Facility: CLINIC | Age: 80
End: 2025-05-23
Payer: MEDICARE

## 2025-05-23 ENCOUNTER — OFFICE VISIT (OUTPATIENT)
Dept: UROLOGY | Facility: CLINIC | Age: 80
End: 2025-05-23
Payer: MEDICARE

## 2025-05-23 ENCOUNTER — TELEPHONE (OUTPATIENT)
Dept: HEMATOLOGY/ONCOLOGY | Facility: CLINIC | Age: 80
End: 2025-05-23
Payer: MEDICARE

## 2025-05-23 VITALS
WEIGHT: 274.25 LBS | HEART RATE: 76 BPM | OXYGEN SATURATION: 98 % | SYSTOLIC BLOOD PRESSURE: 144 MMHG | TEMPERATURE: 96 F | RESPIRATION RATE: 16 BRPM | BODY MASS INDEX: 36.35 KG/M2 | DIASTOLIC BLOOD PRESSURE: 93 MMHG | HEIGHT: 73 IN

## 2025-05-23 VITALS
BODY MASS INDEX: 36.35 KG/M2 | TEMPERATURE: 98 F | HEIGHT: 73 IN | WEIGHT: 274.25 LBS | RESPIRATION RATE: 16 BRPM | HEART RATE: 76 BPM | DIASTOLIC BLOOD PRESSURE: 93 MMHG | OXYGEN SATURATION: 98 % | SYSTOLIC BLOOD PRESSURE: 144 MMHG

## 2025-05-23 DIAGNOSIS — E78.2 MIXED HYPERLIPIDEMIA: ICD-10-CM

## 2025-05-23 DIAGNOSIS — C61 PROSTATE CANCER: ICD-10-CM

## 2025-05-23 DIAGNOSIS — C67.8 MALIGNANT NEOPLASM OF OVERLAPPING SITES OF BLADDER: ICD-10-CM

## 2025-05-23 DIAGNOSIS — C61 PROSTATE CANCER: Primary | ICD-10-CM

## 2025-05-23 PROCEDURE — 99215 OFFICE O/P EST HI 40 MIN: CPT | Mod: S$GLB,,, | Performed by: STUDENT IN AN ORGANIZED HEALTH CARE EDUCATION/TRAINING PROGRAM

## 2025-05-23 PROCEDURE — 1101F PT FALLS ASSESS-DOCD LE1/YR: CPT | Mod: CPTII,S$GLB,, | Performed by: STUDENT IN AN ORGANIZED HEALTH CARE EDUCATION/TRAINING PROGRAM

## 2025-05-23 PROCEDURE — G2211 COMPLEX E/M VISIT ADD ON: HCPCS | Mod: S$GLB,,, | Performed by: STUDENT IN AN ORGANIZED HEALTH CARE EDUCATION/TRAINING PROGRAM

## 2025-05-23 PROCEDURE — 99999 PR PBB SHADOW E&M-EST. PATIENT-LVL IV: CPT | Mod: PBBFAC,,, | Performed by: STUDENT IN AN ORGANIZED HEALTH CARE EDUCATION/TRAINING PROGRAM

## 2025-05-23 PROCEDURE — 3080F DIAST BP >= 90 MM HG: CPT | Mod: CPTII,S$GLB,, | Performed by: STUDENT IN AN ORGANIZED HEALTH CARE EDUCATION/TRAINING PROGRAM

## 2025-05-23 PROCEDURE — 1126F AMNT PAIN NOTED NONE PRSNT: CPT | Mod: CPTII,S$GLB,, | Performed by: STUDENT IN AN ORGANIZED HEALTH CARE EDUCATION/TRAINING PROGRAM

## 2025-05-23 PROCEDURE — 3077F SYST BP >= 140 MM HG: CPT | Mod: CPTII,S$GLB,, | Performed by: STUDENT IN AN ORGANIZED HEALTH CARE EDUCATION/TRAINING PROGRAM

## 2025-05-23 PROCEDURE — 3288F FALL RISK ASSESSMENT DOCD: CPT | Mod: CPTII,S$GLB,, | Performed by: STUDENT IN AN ORGANIZED HEALTH CARE EDUCATION/TRAINING PROGRAM

## 2025-05-23 PROCEDURE — 99999 PR PBB SHADOW E&M-EST. PATIENT-LVL III: CPT | Mod: PBBFAC,,, | Performed by: RADIOLOGY

## 2025-05-23 PROCEDURE — 1159F MED LIST DOCD IN RCRD: CPT | Mod: CPTII,S$GLB,, | Performed by: STUDENT IN AN ORGANIZED HEALTH CARE EDUCATION/TRAINING PROGRAM

## 2025-05-23 RX ORDER — ROSUVASTATIN CALCIUM 10 MG/1
10 TABLET, COATED ORAL NIGHTLY
Qty: 90 TABLET | Refills: 1 | Status: SHIPPED | OUTPATIENT
Start: 2025-05-23

## 2025-05-23 NOTE — TELEPHONE ENCOUNTER
No care due was identified.  [facility] Embedded Care Due Messages. Reference number: [id number].   5/23/2025 4:09:53 AM CDT

## 2025-05-23 NOTE — NURSING
Called [facility] - spoke with daughter, Maye. Confirmed today's visits scheduled with Dr. Julian and Dr. Stanley. Apt time/location reviewed. Requested 2:45PM arrival time. Maye bentley

## 2025-05-23 NOTE — TELEPHONE ENCOUNTER
Refill Decision Note   Nate Bah  is requesting a refill authorization.  Brief Assessment and Rationale for Refill:  Approve     Medication Therapy Plan: Reviewed acute care/admission visit notes; No follow up with PCP recommended by acute care provider; Approved per protocol      Extended chart review required: Yes   Comments:     Note composed:11:06 AM 05/23/2025

## 2025-05-23 NOTE — PROGRESS NOTES
05/23/2025    [facility]   Radiation Oncology Consultation    Assessment   This is a 79 y.o. 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  He presents today to discuss definitive treatment with radiation therapy.        Plan     Treatment options were discussed with the patient including radical prostatectomy, radiation therapy (+ or - brachytherapy boost) and androgen deprivation.  We discussed the goals of treatment to be curative.  The risks, benefits, scheduling, alternatives to and rationale of radiation therapy were explained in detail.    Indication, course, and potential toxicities of pelvic radiation therapy reviewed in detail, including but not limited to fatigue, increased frequency, urgency, or pain with urination, increased frequency or urgency of bowel movements, diarrhea, pelvic bone fragility, erectile dysfunction, decreased volume of ejaculate, remote risk of secondary malignancy.   After this discussion, he elected to proceed with Heme onc consult.    Consent not obtained  A CT simulation will be performed on pending determination/timing of ADT to begin the planning process for the patient's radiation therapy.     Follow up after discussion w/Heme Onc and determination on ADT  He was given our contact information, and he was told that he could call our clinic at any time if he has any questions or concerns.      Radiation Treatment Details:   We plan to treat the prostate to a dose of 70 Gy in 28 fractions at 2.5 Gy per fraction delivered daily  (+50.4Gy to pelvic bo basins IF patient is able to undergo ADT)  We will utilize a(n) IMRT technique.   IMRT is medically necessary to treat complex dose painted target volumes in the prostate region with concave and convex isodose lines with steep isodose gradients to spare multiple adjacent organs at risk including the rectum, bladder, penile bulb   We will utilize daily CT or  orthogonal image guidance due to the need for accurate daily patient alignment to treat the target volumes accurately and avoid radiation overdose to multiple regional organs at risk since we are treating the patient with complex target volumes with multiple steep isodose gradients.          No chief complaint on file.        Oncology History    No history exists.       HPI: The patient is a 79 year old male with a history of bladder cancer status post TURBT and BCG in 2024, now with a diagnosis of prostate cancer.  He was noted to have an elevated PSA of 11 on 12/2/24.  Previous PSA history as follows:   PSA Total Prostate Specific Antigen Free PSA % Free   Latest Ref Rng 0.00 - 4.00 ng/mL 0.00 - 1.50 ng/mL Not established %   8/17/2017      3/3/2020      9/14/2023      10/20/2023      12/2/2024      1/3/2025 13.6 (H)  1.45  10.66    2/7/2025 9.4 (H)  0.97  10.32    5/22/2025         Prostate Specific Antigen PSA Diagnostic   Latest Ref Rng <=4.00 ng/mL 0.00 - 4.00 ng/mL   8/17/2017 2.6     3/3/2020 2.5     9/14/2023 4.8 (H)     10/20/2023  4.3 (H)    12/2/2024  11.0 (H)    1/3/2025     2/7/2025     5/22/2025 8.72 (H)        Legend:  (H) High    3/10/25 MRI prostate noted:  PI-RADS 4 lesion 1.9cm med and lat segs of right PZ from base to apex with central necrosis, restricted diffusion, and peripheral enhancement    Pt admitted for IV abx for osteomyelitis.    On 5/2/25 he underwent TPUS-guided biopsy of the prostate, with pathology as follows:  RPM: GG 4 in 2 of 2  RANDI: GG 4 in 2 of 2 cores; granulomatous prostatitis with extensive necrosis  Lesion 1: GG 4 in 2 of 3    5/22/25 PSMA/PET (GA68):  Low level activity within the central gland, otherwise no evidence of malignancy    Prostate size estimated at 42cc.    He presents today to discuss the potential role of radiation therapy in his cancer care.    IPSS is 5, with ++frequency, no urgency, and nocturia x 2.    He reports NA erectile difficulty at baseline (OMAR  NA).     History of prior irradiation: No  History of prior systemic anti-cancer therapy: No  History of collagen vascular disease: No  Implanted electronic device (pacer/defib/nerve stimulator): Yes - pacer    Past Medical History:   Diagnosis Date    JACKSON (acute kidney injury) 12/04/2016    Anticoagulant long-term use     BPH (benign prostatic hyperplasia)     Cancer     skin cancer to arms; bladder cancer    Coronary artery disease     afib, stents, pacemaker watchman  stents x 5    Diabetes mellitus     Edema     Elevated PSA     Esophagus disorder     esophagus spasms    GERD without esophagitis     Gout     Gross hematuria 07/30/2024    Hypertension     Mixed hyperlipidemia     Obesity (BMI 30-39.9)     Paroxysmal atrial fibrillation     Salmonella gastroenteritis 01/17/2023    Sleep apnea     no Cpap       Past Surgical History:   Procedure Laterality Date    ANGIOGRAM, CORONARY, WITH LEFT HEART CATHETERIZATION Left 05/03/2021    Procedure: Angiogram, Coronary, with Left Heart Cath;  Surgeon: Flakito Winter MD;  Location: [facility] CATH;  Service: Cardiology;  Laterality: Left;    BLADDER FULGURATION N/A 07/31/2024    Procedure: FULGURATION, BLADDER;  Surgeon: Lazaro Julian MD;  Location: [facility] OR;  Service: Urology;  Laterality: N/A;    CARDIAC SURGERY  2003    CABG 1 vessel    CHOLECYSTECTOMY      CLOSURE OF LEFT ATRIAL APPENDAGE USING DEVICE  02/28/2024    Procedure: Watchman;  Surgeon: Fritz Del Valle MD;  Location: [facility] CATH;  Service: Cardiology;;    COLON SURGERY  2007?    resection /perforated colon    COLONOSCOPY N/A 1/16/2025    Procedure: COLONOSCOPY;  Surgeon: Mayank Olmstead Jr., MD;  Location: [facility] ENDO;  Service: Endoscopy;  Laterality: N/A;    CORONARY ANGIOGRAPHY N/A 07/24/2018    Procedure: Coronary angiography;  Surgeon: Italo Mckeon MD;  Location: [facility] CATH;  Service: Cardiology;  Laterality: N/A;    CORONARY ANGIOPLASTY WITH STENT PLACEMENT      5 stents, last one 2015    CORONARY STENT  PLACEMENT N/A 07/24/2018    Procedure: INSERTION, STENT, CORONARY ARTERY;  Surgeon: Italo Mckeon MD;  Location: [facility] CATH;  Service: Cardiology;  Laterality: N/A;    CYSTOSCOPY N/A 07/31/2024    Procedure: CYSTOSCOPY;  Surgeon: Lazaro Julian MD;  Location: [facility] OR;  Service: Urology;  Laterality: N/A;    CYSTOSCOPY W/ RETROGRADES Right 07/10/2024    Procedure: CYSTOSCOPY, WITH RETROGRADE PYELOGRAM;  Surgeon: Lazaro Julian MD;  Location: [facility] OR;  Service: Urology;  Laterality: Right;    ECHOCARDIOGRAM,TRANSESOPHAGEAL  02/28/2024    Procedure: (TESFAYE) intra-procedure;  Surgeon: Adalgisa Goodson MD;  Location: [facility] CATH;  Service: Cardiology;;    ECHOCARDIOGRAM,TRANSESOPHAGEAL N/A 04/01/2024    Procedure: Transesophageal echo (TESFAYE) intra-procedure log documentation;  Surgeon: Flakito Winter MD;  Location: [facility];  Service: Cardiology;  Laterality: N/A;  6 week post-implant TESFAYE for Watchman    ECHOCARDIOGRAM,TRANSESOPHAGEAL N/A 10/14/2024    Procedure: Transesophageal echo (TESFAYE) intra-procedure log documentation;  Surgeon: Flakito Winter MD;  Location: [facility];  Service: Cardiology;  Laterality: N/A;  6 month post-implant TESFAYE for Watchman    ESOPHAGEAL DILATION N/A 1/16/2025    Procedure: DILATION, ESOPHAGUS;  Surgeon: Mayank Olmstead Jr., MD;  Location: [facility] ENDO;  Service: Endoscopy;  Laterality: N/A;    ESOPHAGOGASTRODUODENOSCOPY N/A 1/16/2025    Procedure: EGD (ESOPHAGOGASTRODUODENOSCOPY);  Surgeon: Mayank Olmstead Jr., MD;  Location: [facility] ENDO;  Service: Endoscopy;  Laterality: N/A;    EYE SURGERY      cataracts    FOOT SURGERY Right     with hardware    HEMORRHOID SURGERY      INCISION OF PERIRECTAL ABSCESS N/A 03/25/2020    Procedure: INCISION, ABSCESS, PERIRECTAL;  Surgeon: Nayan Mack MD;  Location: [facility];  Service: General;  Laterality: N/A;    INSERTION OF PACEMAKER Left 05/04/2021    Procedure: INSERTION, PACEMAKER;  Surgeon: Flakito Winter MD;  Location: [facility];  Service:  Cardiology;  Laterality: Left;    INSTILLATION OF URINARY BLADDER N/A 07/10/2024    Procedure: INSTILLATION, BLADDER;  Surgeon: Lazaro Julian MD;  Location: [facility] OR;  Service: Urology;  Laterality: N/A;  Gemcitabine    JOINT REPLACEMENT Left     knee    KNEE ARTHROSCOPY W/ MENISCAL REPAIR Right     KYPHOSIS SURGERY      LEFT HEART CATHETERIZATION N/A 07/24/2018    Procedure: Left heart cath;  Surgeon: Italo Mckeon MD;  Location: [facility] CATH;  Service: Cardiology;  Laterality: N/A;    PROSTATE BIOPSY N/A 5/2/2025    Procedure: BIOPSY, TRANSPERINEAL PROSTATE;  Surgeon: Lazaro Julian MD;  Location: [facility] [place] OR;  Service: Urology;  Laterality: N/A;  TRANSPERINEAL    REVISION OF PROCEDURE INVOLVING PACEMAKER LEAD  11/11/2024    Procedure: Lead Revision (Gee);  Surgeon: Flakito Winter MD;  Location: [facility] CATH;  Service: Cardiology;;    TRANSURETHRAL RESECTION OF BLADDER TUMOR BY BIPOLAR CAUTERY N/A 07/10/2024    Procedure: TURBT, USING BIPOLAR CAUTERY;  Surgeon: Lazaro Julian MD;  Location: [facility] OR;  Service: Urology;  Laterality: N/A;       Social History[1]    Cancer-related family history includes Pancreatic cancer in his mother.    Medications Ordered Prior to Encounter[2]    Review of patient's allergies indicates:   Allergen Reactions    Ace inhibitors      cough       Review of Systems   Constitutional:  Positive for malaise/fatigue. Negative for chills and fever.   HENT:  Positive for congestion (PND).    Eyes:  Negative for double vision.   Respiratory:  Positive for cough and shortness of breath (CARSON).    Cardiovascular:  Negative for chest pain.   Gastrointestinal:  Positive for diarrhea (loose BM). Negative for blood in stool.   Genitourinary:  Positive for frequency. Negative for dysuria, hematuria and urgency.   Neurological:  Negative for dizziness, sensory change, focal weakness, weakness and headaches.        Vital Signs: BP (!) 144/93   Pulse 76   Temp 96 °F (35.6 °C) (Temporal)   Resp 16   Ht 6'  "1" (1.854 m)   Wt 124.4 kg (274 lb 4 oz)   SpO2 98%   BMI 36.18 kg/m²     ECOG Performance Status: 1 - Ambulates, capable of light work    Physical Exam  Vitals and nursing note reviewed.   Constitutional:       General: He is not in acute distress.     Appearance: Normal appearance. He is not ill-appearing or toxic-appearing.   HENT:      Head: Normocephalic and atraumatic.      Nose: Nose normal.   Eyes:      Extraocular Movements: Extraocular movements intact.      Conjunctiva/sclera: Conjunctivae normal.      Pupils: Pupils are equal, round, and reactive to light.   Pulmonary:      Effort: Pulmonary effort is normal.   Musculoskeletal:         General: Normal range of motion.      Cervical back: Normal range of motion and neck supple.   Skin:     General: Skin is warm.   Neurological:      General: No focal deficit present.      Mental Status: He is alert and oriented to person, place, and time.   Psychiatric:         Mood and Affect: Mood normal.         Behavior: Behavior normal.         Thought Content: Thought content normal.         Judgment: Judgment normal.            Imaging: I have personally reviewed the patient's available images and reports and summarized pertinent findings above in HPI.     Pathology: I have personally reviewed the patient's available pathology and summarized pertinent findings above in HPI.     This case was discussed with Dr. Julian      - Thank you for allowing me to participate in the care of your patient.    Raven Stanley MD         [1]   Social History  Tobacco Use    Smoking status: Former     Current packs/day: 0.00     Average packs/day: 1 pack/day for 25.0 years (25.0 ttl pk-yrs)     Types: Cigarettes     Start date: 1985     Quit date: 2010     Years since quitting: 15.4    Smokeless tobacco: Never   Substance Use Topics    Alcohol use: Not Currently     Comment: occasional    Drug use: No   [2]   Current Outpatient Medications on File Prior to Visit   Medication " Sig Dispense Refill    acetaminophen (TYLENOL) 500 MG tablet Take 1,000 mg by mouth as needed for Pain. Indications: pain      allopurinoL (ZYLOPRIM) 300 MG tablet TAKE 1 TABLET EVERY DAY 90 tablet 3    ascorbic acid (VITAMIN C ORAL) Take 1 tablet by mouth every evening.      aspirin (ECOTRIN) 81 MG EC tablet Take 1 tablet (81 mg total) by mouth once daily.      CHOLECALCIFEROL, VITAMIN D3, ORAL Take 1 capsule by mouth every evening. Indications: supplement      docusate sodium (COLACE) 100 MG capsule Take 100 mg by mouth 2 (two) times daily.      furosemide (LASIX) 40 MG tablet TAKE 1 TABLET ONE TIME DAILY (Patient taking differently: Take 40 mg by mouth once daily.) 90 tablet 3    glipiZIDE (GLUCOTROL) 5 MG tablet Take 1 tablet (5 mg total) by mouth daily with breakfast. 90 tablet 3    isosorbide mononitrate (IMDUR) 60 MG 24 hr tablet Take 1 tablet (60 mg total) by mouth every evening. 90 tablet 3    losartan (COZAAR) 100 MG tablet Take 1 tablet (100 mg total) by mouth once daily. 90 tablet 3    magnesium oxide (MAG-OX) 400 mg (241.3 mg magnesium) tablet Take 400 mg by mouth every evening.      metOLazone (ZAROXOLYN) 5 MG tablet Take 1 tablet (5 mg total) by mouth every Saturday. 12 tablet 3    pantoprazole (PROTONIX) 40 MG tablet TAKE 1 TABLET ONE TIME DAILY (Patient taking differently: Take 40 mg by mouth once daily.) 90 tablet 3    tamsulosin (FLOMAX) 0.4 mg Cap Take 1 capsule (0.4 mg total) by mouth every evening. 30 capsule 11    [DISCONTINUED] rosuvastatin (CRESTOR) 10 MG tablet TAKE 1 TABLET EVERY EVENING (Patient taking differently: Take 10 mg by mouth nightly.) 90 tablet 0     No current facility-administered medications on file prior to visit.

## 2025-05-23 NOTE — PROGRESS NOTES
"[facility] - Urology   Clinic Note    Subjective:     Chief Complaint: Prostate Cancer    History of Present Illness:  Nate Bah is a 79 y.o. male who presents to clinic for evaluation and management of prostate cancer.     PSA at diagnosis - 8.72. Prostate volume - 42 cc.   MRI prostate from 3/10/25 showed PI-RADS 5 lesion at the left apex PZ and a PI-RADS 4 lesion from the right base to apex with central necrosis.   Uronav fusion biopsy on 5/2/25 showed:  RPM: GG 4 in 2 of 2  RANDI: GG 4 in 2 of 2 cores; granulomatous prostatitis with extensive necrosis  Lesion 1: GG 4 in 2 of 3  Stage - T2a  NCCN - high risk    PSMA PET scan shows focal uptake within the prostate but no evidence of metastatic disease    IPSS: 5  Erectile dysfunction - OMAR score: severe ED    Bladder Cancer Chronology:     7/10/2024 - TURBT - HG Ta, 2.5 cm, muscle present and not involved, intermediate risk  9/2024 - BCG induction 5/6  10/2024 - cysto and cytology FLORIDALMA  9-10/2024 - Induction BCG  1/2025 - cysto and cytology FLORIDALMA    Past medical, family, surgical and social history reviewed as documented in chart with pertinent positive medical, family, surgical and social history detailed in HPI.    A review systems was conducted with pertinent positive and negative findings documented in HPI.    Anticoagulation/Antiplatelets:  No    Objective:     Estimated body mass index is 36.18 kg/m² as calculated from the following:    Height as of this encounter: 6' 1" (1.854 m).    Weight as of this encounter: 124.4 kg (274 lb 4 oz).    Vital Signs (Most Recent)  Temp: 98 °F (36.7 °C) (05/23/25 1449)  Pulse: 76 (05/23/25 1449)  Resp: 16 (05/23/25 1449)  BP: (!) 144/93 (05/23/25 1449)  SpO2: 98 % (05/23/25 1449)    Physical Exam  Constitutional:       General: He is not in acute distress.     Appearance: He is not ill-appearing or toxic-appearing.   Pulmonary:      Effort: Pulmonary effort is normal. No accessory muscle usage or respiratory " distress.   Neurological:      Mental Status: He is alert.         Lab Results   Component Value Date    BUN 32 (H) 05/06/2025    CREATININE 2.0 (H) 05/06/2025    WBC 5.44 05/06/2025    HGB 15.6 05/06/2025    HCT 46.8 05/06/2025     05/06/2025    AST 22 05/05/2025    ALT 18 05/05/2025    ALKPHOS 112 05/05/2025    ALBUMIN 3.7 05/06/2025    HGBA1C 6.8 (H) 03/25/2025        Lab Results   Component Value Date    PSA 8.72 (H) 05/22/2025    PSA 4.8 (H) 09/14/2023    PSA 2.5 03/03/2020    PSA 2.6 08/17/2017    PSADIAG 11.0 (H) 12/02/2024    PSADIAG 4.3 (H) 10/20/2023    PSAFREE 0.97 02/07/2025    PSAFREE 1.45 01/03/2025    PSAFREEPCT 10.32 02/07/2025    PSAFREEPCT 10.66 01/03/2025        Assessment:     1. Prostate cancer    2. Malignant neoplasm of overlapping sites of bladder      Plan:     We discussed his prostate cancer in depth. We reviewed his diagnosis, stage, grade, risk group, and prognosis. We discussed NCCN risk stratification and discussed the concept of low risk, intermediate risk, and high risk disease. He has high risk disease.  We also reviewed the NCCN treatment nomogram. We discussed the different treatment options including radiation therapy and robotic prostatectomy as well as watchful waiting. We also discussed the advantages, disadvantages, risks and benefits, as well as complications of each option.    - Radiation therapy: we discussed treatment planning, the different techniques, short and long term complications including radiation cystitis, radiation proctitis, and impotence. We discussed success, failure, and salvage therapeutic options.     - Robotic prostatectomy:  We discussed that he has to high-risk for surgery given his age and medical comorbidities.    - - We discussed Androgen Deprivation Therapy (ADT) for Prostate Cancer. Typical side effects include: hot flashes, decreased libido, erectile dysfunction, and breast enlargement, mood changes, fatigue, and muscle loss. Longer terms  risks include osteoporosis, cardiovascular issues, and metabolic changes. Not all patients will experience these side effects, and the severity can vary from person to person. We discussed the importance for monitoring side effects while on therapy.      we also discussed the concept of surveillance/watchful waiting    - He was given the opportunity to ask questions, and his questions and concerns were answered to his satisfaction.    - After discussion he elected to proceed with EBRT   - referral to Medical Oncology for discussion of ADT risks and benefits induration  - case discussed with Dr. Stanley     Surveillance cystoscopy scheduled for 7/11/25 for high risk bladder cancer    Lazaro Julian MD     The visit today included increased complexity associated with the care of the episodic problem addressed above as well as managing the longitudinal care of the patient due to the serious and/or complex managed problem(s).

## 2025-05-25 PROBLEM — C61 PROSTATE CANCER: Status: ACTIVE | Noted: 2025-03-25

## 2025-05-25 PROBLEM — Z12.5 PROSTATE CANCER SCREENING: Status: RESOLVED | Noted: 2019-07-19 | Resolved: 2025-05-25

## 2025-05-27 ENCOUNTER — DOCUMENTATION ONLY (OUTPATIENT)
Dept: HEMATOLOGY/ONCOLOGY | Facility: CLINIC | Age: 80
End: 2025-05-27
Payer: MEDICARE

## 2025-05-27 NOTE — NURSING
Mr. Bah was seen by Dr. Julian and Dr. tSanley in  clinic on 5/23/25. Following these visits he elected to proceed with ADT + radiation. He was referred by Dr. Julian to [facility] d/t high risk prostate cancer.     Met with Mr. Bah following clinic visits. Scheduled 1st available new patient apt with Dr. Tapia on 5/30/25. Reviewed date/time/location of this visit.     Encouraged Mr. Bah to contact me with any questions/concerns. He v/u. He was escorted out of clinic to [facility].     Of note, he has a cystoscopy scheduled with Dr. Julian on 7/11 for hx bladder cancer.     IPSS scanned into Epic and attached to this encounter.

## 2025-05-30 ENCOUNTER — LAB VISIT (OUTPATIENT)
Dept: LAB | Facility: HOSPITAL | Age: 80
End: 2025-05-30
Attending: INTERNAL MEDICINE
Payer: MEDICARE

## 2025-05-30 ENCOUNTER — PATIENT MESSAGE (OUTPATIENT)
Dept: ADMINISTRATIVE | Facility: OTHER | Age: 80
End: 2025-05-30
Payer: MEDICARE

## 2025-05-30 ENCOUNTER — TELEPHONE (OUTPATIENT)
Dept: HEMATOLOGY/ONCOLOGY | Facility: CLINIC | Age: 80
End: 2025-05-30
Payer: MEDICARE

## 2025-05-30 ENCOUNTER — OFFICE VISIT (OUTPATIENT)
Dept: HEMATOLOGY/ONCOLOGY | Facility: CLINIC | Age: 80
End: 2025-05-30
Payer: MEDICARE

## 2025-05-30 VITALS
RESPIRATION RATE: 16 BRPM | BODY MASS INDEX: 36.25 KG/M2 | DIASTOLIC BLOOD PRESSURE: 70 MMHG | OXYGEN SATURATION: 98 % | TEMPERATURE: 98 F | HEIGHT: 73 IN | SYSTOLIC BLOOD PRESSURE: 118 MMHG | WEIGHT: 273.56 LBS | HEART RATE: 88 BPM

## 2025-05-30 DIAGNOSIS — I25.10 CORONARY ARTERY DISEASE DUE TO LIPID RICH PLAQUE: ICD-10-CM

## 2025-05-30 DIAGNOSIS — Z80.0 FAMILY HISTORY OF PANCREATIC CANCER: ICD-10-CM

## 2025-05-30 DIAGNOSIS — E66.9 OBESITY (BMI 30-39.9): ICD-10-CM

## 2025-05-30 DIAGNOSIS — E66.9 OBESITY (BMI 30-39.9): Primary | ICD-10-CM

## 2025-05-30 DIAGNOSIS — I48.0 PAROXYSMAL ATRIAL FIBRILLATION: ICD-10-CM

## 2025-05-30 DIAGNOSIS — S22.068A OTHER FRACTURE OF T7-T8 THORACIC VERTEBRA, INITIAL ENCOUNTER FOR CLOSED FRACTURE: ICD-10-CM

## 2025-05-30 DIAGNOSIS — I25.83 CORONARY ARTERY DISEASE DUE TO LIPID RICH PLAQUE: ICD-10-CM

## 2025-05-30 DIAGNOSIS — I25.10 CORONARY ARTERY DISEASE, UNSPECIFIED VESSEL OR LESION TYPE, UNSPECIFIED WHETHER ANGINA PRESENT, UNSPECIFIED WHETHER NATIVE OR TRANSPLANTED HEART: ICD-10-CM

## 2025-05-30 DIAGNOSIS — Z95.1 S/P CABG X 1: ICD-10-CM

## 2025-05-30 DIAGNOSIS — C67.8 MALIGNANT NEOPLASM OF OVERLAPPING SITES OF BLADDER: ICD-10-CM

## 2025-05-30 DIAGNOSIS — C61 PROSTATE CANCER: ICD-10-CM

## 2025-05-30 LAB
25(OH)D3+25(OH)D2 SERPL-MCNC: 50 NG/ML (ref 30–96)
ABSOLUTE EOSINOPHIL (OHS): 0.21 K/UL
ABSOLUTE MONOCYTE (OHS): 0.51 K/UL (ref 0.3–1)
ABSOLUTE NEUTROPHIL COUNT (OHS): 3.36 K/UL (ref 1.8–7.7)
ALBUMIN SERPL BCP-MCNC: 4.1 G/DL (ref 3.5–5.2)
ALP SERPL-CCNC: 104 UNIT/L (ref 40–150)
ALT SERPL W/O P-5'-P-CCNC: 28 UNIT/L (ref 10–44)
ANION GAP (OHS): 11 MMOL/L (ref 8–16)
AST SERPL-CCNC: 24 UNIT/L (ref 11–45)
BASOPHILS # BLD AUTO: 0.04 K/UL
BASOPHILS NFR BLD AUTO: 0.8 %
BILIRUB SERPL-MCNC: 2.3 MG/DL (ref 0.1–1)
BUN SERPL-MCNC: 25 MG/DL (ref 8–23)
CALCIUM SERPL-MCNC: 9.8 MG/DL (ref 8.7–10.5)
CHLORIDE SERPL-SCNC: 103 MMOL/L (ref 95–110)
CO2 SERPL-SCNC: 25 MMOL/L (ref 23–29)
CREAT SERPL-MCNC: 1.6 MG/DL (ref 0.5–1.4)
ERYTHROCYTE [DISTWIDTH] IN BLOOD BY AUTOMATED COUNT: 14.3 % (ref 11.5–14.5)
GFR SERPLBLD CREATININE-BSD FMLA CKD-EPI: 44 ML/MIN/1.73/M2
GLUCOSE SERPL-MCNC: 110 MG/DL (ref 70–110)
HCT VFR BLD AUTO: 40.8 % (ref 40–54)
HGB BLD-MCNC: 14.4 GM/DL (ref 14–18)
IMM GRANULOCYTES # BLD AUTO: 0.03 K/UL (ref 0–0.04)
IMM GRANULOCYTES NFR BLD AUTO: 0.6 % (ref 0–0.5)
LYMPHOCYTES # BLD AUTO: 1.16 K/UL (ref 1–4.8)
MCH RBC QN AUTO: 31 PG (ref 27–31)
MCHC RBC AUTO-ENTMCNC: 35.3 G/DL (ref 32–36)
MCV RBC AUTO: 88 FL (ref 82–98)
NUCLEATED RBC (/100WBC) (OHS): 0 /100 WBC
PLATELET # BLD AUTO: 143 K/UL (ref 150–450)
PMV BLD AUTO: 9.8 FL (ref 9.2–12.9)
POTASSIUM SERPL-SCNC: 3.8 MMOL/L (ref 3.5–5.1)
PROT SERPL-MCNC: 7.9 GM/DL (ref 6–8.4)
RBC # BLD AUTO: 4.65 M/UL (ref 4.6–6.2)
RELATIVE EOSINOPHIL (OHS): 4 %
RELATIVE LYMPHOCYTE (OHS): 21.8 % (ref 18–48)
RELATIVE MONOCYTE (OHS): 9.6 % (ref 4–15)
RELATIVE NEUTROPHIL (OHS): 63.2 % (ref 38–73)
SODIUM SERPL-SCNC: 139 MMOL/L (ref 136–145)
TESTOST SERPL-MCNC: 551 NG/DL (ref 304–1227)
WBC # BLD AUTO: 5.31 K/UL (ref 3.9–12.7)

## 2025-05-30 PROCEDURE — 85025 COMPLETE CBC W/AUTO DIFF WBC: CPT | Mod: PN

## 2025-05-30 PROCEDURE — 36415 COLL VENOUS BLD VENIPUNCTURE: CPT | Mod: PN

## 2025-05-30 PROCEDURE — 84403 ASSAY OF TOTAL TESTOSTERONE: CPT

## 2025-05-30 PROCEDURE — 82947 ASSAY GLUCOSE BLOOD QUANT: CPT | Mod: PN

## 2025-05-30 PROCEDURE — 99999 PR PBB SHADOW E&M-EST. PATIENT-LVL V: CPT | Mod: PBBFAC,,, | Performed by: INTERNAL MEDICINE

## 2025-05-30 PROCEDURE — 82306 VITAMIN D 25 HYDROXY: CPT

## 2025-05-30 RX ORDER — BICALUTAMIDE 50 MG/1
50 TABLET, FILM COATED ORAL DAILY
Qty: 30 TABLET | Refills: 0 | Status: SHIPPED | OUTPATIENT
Start: 2025-05-30 | End: 2025-06-29

## 2025-05-30 NOTE — TELEPHONE ENCOUNTER
Integrative oncology referral in [place].   Spoke with pt and scheduled first available appt. Pt accepted appt and VU of date/time/location.

## 2025-05-30 NOTE — PROGRESS NOTES
Subjective:       Name: Nate Bah  : 1945  MRN: [id number]    Chief Complaint   Patient presents with    Prostate Cancer     New pt; referred by Dr. Julian for high risk prostate cancer         Patient is in clinic    HPI: Nate Bah is a 79 y.o. male presents for evaluation of Prostate Cancer (New pt; referred by Dr. Julian for high risk prostate cancer )    3/10/25 MRI prostate noted:  PI-RADS 4 lesion 1.9cm med and lat segs of right PZ from base to apex with central necrosis, restricted diffusion, and peripheral enhancement     Pt admitted for IV abx for osteomyelitis.     On 25 he underwent TPUS-guided biopsy of the prostate, with pathology as follows:  RPM: GG 4 in 2 of 2  RANDI: GG 4 in 2 of 2 cores; granulomatous prostatitis with extensive necrosis  Lesion 1: GG 4 in 2 of 3     25 PSMA/PET (GA68):  Low level activity within the central gland, otherwise no evidence of malignancy     Family history.    Mother and maternal uncle had both pancreatic cancer.  Oncology History   Prostate cancer   3/25/2025 Initial Diagnosis    Prostate cancer     2025 Cancer Staged    Staging form: Prostate, AJCC 8th Edition  - Pathologic stage from 2025: Stage IIC (pT2, pN0, cM0, PSA: 8.7, Grade Group: 4)     2025 -  Chemotherapy    Treatment Summary   Plan Name: OP PROSTATE LEUPROLIDE Q3MO  Treatment Goal: Curative  Status: Active  Start Date: 2025 (Planned)  End Date: 12/10/2027 (Planned)  Provider: Sherin Tapia MD  Chemotherapy: leuprolide (LUPRON) injection 22.5 mg, 22.5 mg, Intramuscular, Clinic/HOD 1 time, 0 of 12 cycles          Past Medical History:   Diagnosis Date    JACKSON (acute kidney injury) 2016    Anticoagulant long-term use     BPH (benign prostatic hyperplasia)     Cancer     skin cancer to arms; bladder cancer    Coronary artery disease     afib, stents, pacemaker watchman  stents x 5    Diabetes mellitus     Edema     Elevated PSA     Esophagus disorder      esophagus spasms    GERD without esophagitis     Gout     Gross hematuria 07/30/2024    Hypertension     Mixed hyperlipidemia     Obesity (BMI 30-39.9)     Paroxysmal atrial fibrillation     Salmonella gastroenteritis 01/17/2023    Sleep apnea     no Cpap       Past Surgical History:   Procedure Laterality Date    ANGIOGRAM, CORONARY, WITH LEFT HEART CATHETERIZATION Left 05/03/2021    Procedure: Angiogram, Coronary, with Left Heart Cath;  Surgeon: Flakito Winter MD;  Location: [facility] CATH;  Service: Cardiology;  Laterality: Left;    BLADDER FULGURATION N/A 07/31/2024    Procedure: FULGURATION, BLADDER;  Surgeon: Lazaro Julian MD;  Location: [facility] OR;  Service: Urology;  Laterality: N/A;    CARDIAC SURGERY  2003    CABG 1 vessel    CHOLECYSTECTOMY      CLOSURE OF LEFT ATRIAL APPENDAGE USING DEVICE  02/28/2024    Procedure: Watchman;  Surgeon: Fritz Del Valle MD;  Location: [facility] CATH;  Service: Cardiology;;    COLON SURGERY  2007?    resection /perforated colon    COLONOSCOPY N/A 1/16/2025    Procedure: COLONOSCOPY;  Surgeon: Mayank Olmstead Jr., MD;  Location: [facility] ENDO;  Service: Endoscopy;  Laterality: N/A;    CORONARY ANGIOGRAPHY N/A 07/24/2018    Procedure: Coronary angiography;  Surgeon: Italo Mckeon MD;  Location: [facility] CATH;  Service: Cardiology;  Laterality: N/A;    CORONARY ANGIOPLASTY WITH STENT PLACEMENT      5 stents, last one 2015    CORONARY STENT PLACEMENT N/A 07/24/2018    Procedure: INSERTION, STENT, CORONARY ARTERY;  Surgeon: Italo Mckeon MD;  Location: [facility] CATH;  Service: Cardiology;  Laterality: N/A;    CYSTOSCOPY N/A 07/31/2024    Procedure: CYSTOSCOPY;  Surgeon: Lazaro Julian MD;  Location: [facility] OR;  Service: Urology;  Laterality: N/A;    CYSTOSCOPY W/ RETROGRADES Right 07/10/2024    Procedure: CYSTOSCOPY, WITH RETROGRADE PYELOGRAM;  Surgeon: Lazaro Julian MD;  Location: [facility] OR;  Service: Urology;  Laterality: Right;    ECHOCARDIOGRAM,TRANSESOPHAGEAL  02/28/2024    Procedure: (TESFAYE)  intra-procedure;  Surgeon: Adalgisa Goodson MD;  Location: [facility];  Service: Cardiology;;    ECHOCARDIOGRAM,TRANSESOPHAGEAL N/A 04/01/2024    Procedure: Transesophageal echo (TESFAYE) intra-procedure log documentation;  Surgeon: Flakito Winter MD;  Location: [facility];  Service: Cardiology;  Laterality: N/A;  6 week post-implant TESFAYE for Watchman    ECHOCARDIOGRAM,TRANSESOPHAGEAL N/A 10/14/2024    Procedure: Transesophageal echo (TESFAYE) intra-procedure log documentation;  Surgeon: Flakito Winter MD;  Location: [facility];  Service: Cardiology;  Laterality: N/A;  6 month post-implant TESFAYE for Watchman    ESOPHAGEAL DILATION N/A 1/16/2025    Procedure: DILATION, ESOPHAGUS;  Surgeon: Mayank Olmstead Jr., MD;  Location: [facility];  Service: Endoscopy;  Laterality: N/A;    ESOPHAGOGASTRODUODENOSCOPY N/A 1/16/2025    Procedure: EGD (ESOPHAGOGASTRODUODENOSCOPY);  Surgeon: Mayank Olmstead Jr., MD;  Location: [facility];  Service: Endoscopy;  Laterality: N/A;    EYE SURGERY      cataracts    FOOT SURGERY Right     with hardware    HEMORRHOID SURGERY      INCISION OF PERIRECTAL ABSCESS N/A 03/25/2020    Procedure: INCISION, ABSCESS, PERIRECTAL;  Surgeon: Nayan Mack MD;  Location: [facility];  Service: General;  Laterality: N/A;    INSERTION OF PACEMAKER Left 05/04/2021    Procedure: INSERTION, PACEMAKER;  Surgeon: Flakito Winter MD;  Location: [facility] CATH;  Service: Cardiology;  Laterality: Left;    INSTILLATION OF URINARY BLADDER N/A 07/10/2024    Procedure: INSTILLATION, BLADDER;  Surgeon: Lazaro Julian MD;  Location: [facility];  Service: Urology;  Laterality: N/A;  Gemcitabine    JOINT REPLACEMENT Left     knee    KNEE ARTHROSCOPY W/ MENISCAL REPAIR Right     KYPHOSIS SURGERY      LEFT HEART CATHETERIZATION N/A 07/24/2018    Procedure: Left heart cath;  Surgeon: Italo Mckeon MD;  Location: [facility] CATH;  Service: Cardiology;  Laterality: N/A;    PROSTATE BIOPSY N/A 5/2/2025    Procedure: BIOPSY, TRANSPERINEAL PROSTATE;   Surgeon: Lazaro Julian MD;  Location: [facility] OR;  Service: Urology;  Laterality: N/A;  TRANSPERINEAL    REVISION OF PROCEDURE INVOLVING PACEMAKER LEAD  11/11/2024    Procedure: Lead Revision (Gee);  Surgeon: Flakito Winter MD;  Location: [facility] CATH;  Service: Cardiology;;    TRANSURETHRAL RESECTION OF BLADDER TUMOR BY BIPOLAR CAUTERY N/A 07/10/2024    Procedure: TURBT, USING BIPOLAR CAUTERY;  Surgeon: Lazaro Julian MD;  Location: [facility] OR;  Service: Urology;  Laterality: N/A;       Family History   Problem Relation Name Age of Onset    Pancreatic cancer Mother      Heart disease Father      No Known Problems Brother         Social History     Socioeconomic History    Marital status:    Tobacco Use    Smoking status: Former     Current packs/day: 0.00     Average packs/day: 1 pack/day for 25.0 years (25.0 ttl pk-yrs)     Types: Cigarettes     Start date: 1985     Quit date: 2010     Years since quitting: 15.4    Smokeless tobacco: Never   Substance and Sexual Activity    Alcohol use: Not Currently     Comment: occasional    Drug use: No    Sexual activity: Yes     Partners: Female     Social Drivers of Health     Financial Resource Strain: Low Risk  (3/26/2025)    Overall Financial Resource Strain (CARDIA)     Difficulty of Paying Living Expenses: Not hard at all   Food Insecurity: No Food Insecurity (3/26/2025)    Hunger Vital Sign     Worried About Running Out of Food in the Last Year: Never true     Ran Out of Food in the Last Year: Never true   Transportation Needs: No Transportation Needs (5/5/2025)    OASIS : Transportation     Lack of Transportation (Medical): No     Lack of Transportation (Non-Medical): No     Patient Unable or Declines to Respond: No   Physical Activity: Unknown (5/27/2024)    Exercise Vital Sign     Days of Exercise per Week: 0 days   Stress: No Stress Concern Present (3/26/2025)    [employer] - Occupational Stress Questionnaire     Feeling of  "Stress : Not at all   Housing Stability: Low Risk  (3/26/2025)    Housing Stability Vital Sign     Unable to Pay for Housing in the Last Year: No     Number of Times Moved in the Last Year: 0     Homeless in the Last Year: No       Review of patient's allergies indicates:   Allergen Reactions    Ace inhibitors      cough       Answers submitted by the patient for this visit:  Review of Systems Questionnaire (Submitted on 5/28/2025)  appetite change : No  unexpected weight change: No  mouth sores: No  visual disturbance: No  cough: No  shortness of breath: No  chest pain: No  abdominal pain: No  diarrhea: No  frequency: No  back pain: No  rash: No  headaches: No  adenopathy: No  nervous/ anxious: No           Objective:     Vitals:    05/30/25 0853   BP: 118/70   BP Location: Right arm   Patient Position: Sitting   Pulse: 88   Resp: 16   Temp: 97.7 °F (36.5 °C)   TempSrc: Temporal   SpO2: 98%   Weight: 124.1 kg (273 lb 9.5 oz)   Height: 6' 1" (1.854 m)        Physical Exam  Vitals reviewed.   Constitutional:       Appearance: Normal appearance.   HENT:      Head: Normocephalic and atraumatic.   Eyes:      General: No scleral icterus.     Pupils: Pupils are equal, round, and reactive to light.   Cardiovascular:      Rate and Rhythm: Normal rate and regular rhythm.      Pulses: Normal pulses.      Heart sounds: Normal heart sounds.   Pulmonary:      Effort: Pulmonary effort is normal.      Breath sounds: Normal breath sounds.   Abdominal:      General: Bowel sounds are normal. There is no distension.   Musculoskeletal:         General: No swelling.   Lymphadenopathy:      Cervical: No cervical adenopathy.   Skin:     General: Skin is warm.      Findings: No rash.   Neurological:      General: No focal deficit present.      Mental Status: He is alert and oriented to person, place, and time.                Current Outpatient Medications on File Prior to Visit   Medication Sig    acetaminophen (TYLENOL) 500 MG tablet Take " 1,000 mg by mouth as needed for Pain. Indications: pain    allopurinoL (ZYLOPRIM) 300 MG tablet TAKE 1 TABLET EVERY DAY    ascorbic acid (VITAMIN C ORAL) Take 1 tablet by mouth every evening.    aspirin (ECOTRIN) 81 MG EC tablet Take 1 tablet (81 mg total) by mouth once daily.    CHOLECALCIFEROL, VITAMIN D3, ORAL Take 1 capsule by mouth every evening. Indications: supplement    docusate sodium (COLACE) 100 MG capsule Take 100 mg by mouth 2 (two) times daily.    furosemide (LASIX) 40 MG tablet TAKE 1 TABLET ONE TIME DAILY (Patient taking differently: Take 40 mg by mouth once daily.)    glipiZIDE (GLUCOTROL) 5 MG tablet Take 1 tablet (5 mg total) by mouth daily with breakfast.    isosorbide mononitrate (IMDUR) 60 MG 24 hr tablet Take 1 tablet (60 mg total) by mouth every evening.    losartan (COZAAR) 100 MG tablet Take 1 tablet (100 mg total) by mouth once daily.    magnesium oxide (MAG-OX) 400 mg (241.3 mg magnesium) tablet Take 400 mg by mouth every evening.    metOLazone (ZAROXOLYN) 5 MG tablet Take 1 tablet (5 mg total) by mouth every Saturday.    pantoprazole (PROTONIX) 40 MG tablet TAKE 1 TABLET ONE TIME DAILY (Patient taking differently: Take 40 mg by mouth once daily.)    rosuvastatin (CRESTOR) 10 MG tablet TAKE 1 TABLET EVERY EVENING    tamsulosin (FLOMAX) 0.4 mg Cap Take 1 capsule (0.4 mg total) by mouth every evening.     No current facility-administered medications on file prior to visit.       CBC:  Lab Results   Component Value Date    WBC 5.31 05/30/2025    HGB 14.4 05/30/2025    HCT 40.8 05/30/2025    MCV 88 05/30/2025     (L) 05/30/2025         CMP:  Sodium   Date Value Ref Range Status   05/30/2025 139 136 - 145 mmol/L Final   04/29/2025 135 (L) 136 - 145 mmol/L Final     Potassium   Date Value Ref Range Status   05/30/2025 3.8 3.5 - 5.1 mmol/L Final   04/29/2025 3.9 3.5 - 5.1 mmol/L Final     Comment:     Anion Gap reference range revised on 4/28/2023     Chloride   Date Value Ref Range  Status   05/30/2025 103 95 - 110 mmol/L Final   04/29/2025 98 95 - 110 mmol/L Final     CO2   Date Value Ref Range Status   05/30/2025 25 23 - 29 mmol/L Final   04/29/2025 28 23 - 29 mmol/L Final     Glucose   Date Value Ref Range Status   05/30/2025 110 70 - 110 mg/dL Final   04/29/2025 154 (H) 70 - 110 mg/dL Final     Comment:     The ADA recommends the following guidelines for fasting glucose:    Normal:       less than 100 mg/dL    Prediabetes:  100 mg/dL to 125 mg/dL    Diabetes:     126 mg/dL or higher       BUN   Date Value Ref Range Status   05/30/2025 25 (H) 8 - 23 mg/dL Final     Creatinine   Date Value Ref Range Status   05/30/2025 1.6 (H) 0.5 - 1.4 mg/dL Final     Calcium   Date Value Ref Range Status   05/30/2025 9.8 8.7 - 10.5 mg/dL Final   04/29/2025 9.5 8.7 - 10.5 mg/dL Final     Protein Total   Date Value Ref Range Status   05/30/2025 7.9 6.0 - 8.4 gm/dL Final     Total Protein   Date Value Ref Range Status   04/29/2025 7.2 6.0 - 8.4 g/dL Final     Albumin   Date Value Ref Range Status   05/30/2025 4.1 3.5 - 5.2 g/dL Final   04/29/2025 3.9 3.5 - 5.2 g/dL Final     Total Bilirubin   Date Value Ref Range Status   04/29/2025 1.2 (H) 0.2 - 1.0 mg/dL Final     Bilirubin Total   Date Value Ref Range Status   05/30/2025 2.3 (H) 0.1 - 1.0 mg/dL Final     Comment:     For infants and newborns, interpretation of results should be based   on gestational age, weight and in agreement with clinical   observations.    Premature Infant recommended reference ranges:   0-24 hours:  <8.0 mg/dL   24-48 hours: <12.0 mg/dL   3-5 days:    <15.0 mg/dL   6-29 days:   <15.0 mg/dL     Alkaline Phosphatase   Date Value Ref Range Status   04/29/2025 116 40 - 150 U/L Final     ALP   Date Value Ref Range Status   05/30/2025 104 40 - 150 unit/L Final     AST ([facility])   Date Value Ref Range Status   01/26/2016 26 17 - 59 U/L Final     AST   Date Value Ref Range Status   05/30/2025 24 11 - 45 unit/L Final   04/29/2025 22 10  - 40 U/L Final     ALT   Date Value Ref Range Status   05/30/2025 28 10 - 44 unit/L Final   04/29/2025 20 10 - 44 U/L Final     Anion Gap   Date Value Ref Range Status   05/30/2025 11 8 - 16 mmol/L Final     eGFR if    Date Value Ref Range Status   07/21/2021 51.9 (A) >60 mL/min/1.73 m^2 Final     eGFR if non    Date Value Ref Range Status   07/21/2021 44.9 (A) >60 mL/min/1.73 m^2 Final     Comment:     Calculation used to obtain the estimated glomerular filtration  rate (eGFR) is the CKD-EPI equation.          NM PET CT GA68 PSMA, midthigh to vertex  Narrative: EXAMINATION:  PET-CT fusion exam    CLINICAL HISTORY:  Prostate cancer    TECHNIQUE:  PET-CT fusion exam performed from skull apex to mid thigh after intravenous administration of 6.1 mCi of Ga-68    COMPARISON:  No comparison    FINDINGS:  Head neck: No radiotracer avid malignancy to the soft tissues of the head or neck.    Chest: No radiotracer avid malignancy to the soft tissues of the chest.  Atherosclerotic disease noted.  Multiple cardiac leads.    Abdomen and pelvis: Multi focal partially confluent irregular areas radiotracer activity in the mid prostate measuring up to 6.3 SUV.  No other radiotracer avid malignancy to the soft tissues of the abdomen or pelvis.    Musculoskeletal system: No evidence of malignancy.  Vertebroplasty upper lumbar spine.  Sternotomy wires noted.  Impression: Low level activity within the central prostate gland.  Otherwise no evidence of malignancy.    Electronically signed by: Jadon Nam MD  Date:    05/22/2025  Time:    15:11       ECOG SCORE    1 - Restricted in strenuous activity-ambulatory and able to carry out work of a light nature              Assessment/Plan:       Prostate cancer Grade Group 4, PSA 13.6, high risk risk adenocarcinoma of the prostate.     -I reviewed independently the patient radiologic and pathologic findings.  I discussed with him a stage prognosis and plan  of care.  In view of his local regional nonmetastatic disease the patient will benefit from local treatment with radiation  and ADT for 12-36 months.  -ADT is associated with a wide range of side effects that can significantly impair quality of life.  Important and/or frequent side effects include: Loss of lean body mass, increased body fat, and decreased muscle strength, sexual dysfunction, loss of bone mineral density, which can result in bone fracture due to osteoporosis, vasomotor instability, which is manifested by hot flashes, gynecomastia, decreased body hair, and smaller penile and/or testicular size, fatigue, behavioral and neurologic effects, cardiovascular and metabolic abnormalities.  -Dietary calcium intake (food and supplements) of 1000 to 1200 mg daily, supplemental vitamin D 800 to 1000 international units daily, and lifestyle modifications (weight-bearing exercise, decreased alcohol consumption, smoking discontinuation) are indicated for all males beginning ADT.  - If a GnRH agonist is chosen for initial therapy, it is recommended to prescribe a two to four weeks course of  concurrent antiandrogen therapy (termed complete androgen blockade [CAB]) to prevent disease flare.  - He will be scheduled to undergo a bone density to assess his bone health since he is at high risk of developing bone disease while on ADT.    - He also will be started on Lupron at a dose of 22.5 mg IM every 3 months.  He will be started on Casodex at a dose of 50 mg 1 tablet a day for the 1st 2 weeks to avoid it testosterone flare-up.     He will have blood workup drawn today for CBC CMP testosterone and vitamin-D.    He will be referred to genetic counselor to proceed with genetic testing in view of his personal and family history of cancer.    He also will be referred to Integrative Medicine to establish care before starting his ADT treatment.    He will be seen back again in 6 weeks to discuss the results of his workup in  the next step in his management.          Chronic kidney disease stage 3a  Avoid nephrotoxic medication increase p.o. intake and keep himself hydrated.    Follow up BMP.        Coronary artery disease status post CABG status post PCI.    Medically stable.    Currently on aspirin he losartan and Crestor.    He was advised to continue to follow up with his cardiologist    High degree AV block :  symptomatic bradycardia requiring dual-chamber pacemaker placement     Chronic atrial fibrillation.    intermittent episodes with no recent atrial fibrillation on device interrogation  Watchman placed 2/24   -on  aspirin therapy      Family history of pancreatic cancer.    He will be referred to our genetic counselor to proceed with germline testing.      Personal history of bladder cancer.    7/10/2024 - TURBT - HG Ta, 2.5 cm, muscle present and not involved, intermediate risk  9/2024 - BCG induction 5/6  10/2024 - cysto and cytology FLORIDALMA  9-10/2024 - Induction BCG  1/2025 - cysto and cytology FLORIDALMA    65 minutes of total time spent on the encounter, which includes face to face time and non-face to face time preparing to see the patient (eg, review of tests), obtaining and/or reviewing separately obtained history, documenting clinical information in the electronic or other health record, independently interpreting results (not separately reported) and communicating results to the patient/family/caregiver, or care coordination (not separately reported).            Med Onc Chart Routing      Follow up with physician 6 weeks. to discuss labs drawn today. Referral to genetics and integrative medicien. Bone density ASAP   Follow up with [facility]    Infusion scheduling note    Injection scheduling note please schedule his lupron injection   Labs CBC and CMP   Scheduling:  Preferred lab:  Lab interval:  Testosterone, Vitamin D   Imaging    Pharmacy appointment    Other referrals                 Plan was discussed with the patient at length,  and he verbalized understanding. Nate was given an opportunity to ask questions that were answered to his satisfaction, and he was advised to call in the interval if any problems or questions arise.  Signed:  Sherin Tapia MD   Hematology and Oncology  [facility] - HEMATOLOGY ONCOLOGY  [facility], [place]

## 2025-06-02 ENCOUNTER — OFFICE VISIT (OUTPATIENT)
Dept: HEMATOLOGY/ONCOLOGY | Facility: CLINIC | Age: 80
End: 2025-06-02
Payer: MEDICARE

## 2025-06-02 ENCOUNTER — PATIENT MESSAGE (OUTPATIENT)
Dept: HEMATOLOGY/ONCOLOGY | Facility: CLINIC | Age: 80
End: 2025-06-02

## 2025-06-02 ENCOUNTER — TELEPHONE (OUTPATIENT)
Dept: HEMATOLOGY/ONCOLOGY | Facility: CLINIC | Age: 80
End: 2025-06-02
Payer: MEDICARE

## 2025-06-02 ENCOUNTER — PATIENT MESSAGE (OUTPATIENT)
Dept: HEMATOLOGY/ONCOLOGY | Facility: CLINIC | Age: 80
End: 2025-06-02
Payer: MEDICARE

## 2025-06-02 VITALS
BODY MASS INDEX: 35.93 KG/M2 | WEIGHT: 272.31 LBS | OXYGEN SATURATION: 98 % | TEMPERATURE: 98 F | SYSTOLIC BLOOD PRESSURE: 129 MMHG | HEART RATE: 84 BPM | DIASTOLIC BLOOD PRESSURE: 87 MMHG

## 2025-06-02 DIAGNOSIS — C61 PROSTATE CANCER: ICD-10-CM

## 2025-06-02 DIAGNOSIS — M54.59 OTHER LOW BACK PAIN: Primary | ICD-10-CM

## 2025-06-02 PROCEDURE — 3074F SYST BP LT 130 MM HG: CPT | Mod: CPTII,S$GLB,, | Performed by: NURSE PRACTITIONER

## 2025-06-02 PROCEDURE — 3079F DIAST BP 80-89 MM HG: CPT | Mod: CPTII,S$GLB,, | Performed by: NURSE PRACTITIONER

## 2025-06-02 PROCEDURE — 1160F RVW MEDS BY RX/DR IN RCRD: CPT | Mod: CPTII,S$GLB,, | Performed by: NURSE PRACTITIONER

## 2025-06-02 PROCEDURE — 3288F FALL RISK ASSESSMENT DOCD: CPT | Mod: CPTII,S$GLB,, | Performed by: NURSE PRACTITIONER

## 2025-06-02 PROCEDURE — 1100F PTFALLS ASSESS-DOCD GE2>/YR: CPT | Mod: CPTII,S$GLB,, | Performed by: NURSE PRACTITIONER

## 2025-06-02 PROCEDURE — 1159F MED LIST DOCD IN RCRD: CPT | Mod: CPTII,S$GLB,, | Performed by: NURSE PRACTITIONER

## 2025-06-02 PROCEDURE — 99999 PR PBB SHADOW E&M-EST. PATIENT-LVL IV: CPT | Mod: PBBFAC,,, | Performed by: NURSE PRACTITIONER

## 2025-06-02 PROCEDURE — 99215 OFFICE O/P EST HI 40 MIN: CPT | Mod: S$GLB,,, | Performed by: NURSE PRACTITIONER

## 2025-06-02 PROCEDURE — 1126F AMNT PAIN NOTED NONE PRSNT: CPT | Mod: CPTII,S$GLB,, | Performed by: NURSE PRACTITIONER

## 2025-06-03 ENCOUNTER — LAB VISIT (OUTPATIENT)
Dept: LAB | Facility: HOSPITAL | Age: 80
End: 2025-06-03
Attending: INTERNAL MEDICINE
Payer: MEDICARE

## 2025-06-03 ENCOUNTER — OFFICE VISIT (OUTPATIENT)
Dept: HEMATOLOGY/ONCOLOGY | Facility: CLINIC | Age: 80
End: 2025-06-03
Payer: MEDICARE

## 2025-06-03 DIAGNOSIS — Z80.52 FAMILY HISTORY OF BLADDER CANCER: ICD-10-CM

## 2025-06-03 DIAGNOSIS — Z71.83 ENCOUNTER FOR NONPROCREATIVE GENETIC COUNSELING: ICD-10-CM

## 2025-06-03 DIAGNOSIS — C67.8 MALIGNANT NEOPLASM OF OVERLAPPING SITES OF BLADDER: ICD-10-CM

## 2025-06-03 DIAGNOSIS — C44.90 SKIN CANCER: ICD-10-CM

## 2025-06-03 DIAGNOSIS — C61 PROSTATE CANCER: Primary | ICD-10-CM

## 2025-06-03 DIAGNOSIS — Z80.0 FAMILY HISTORY OF PANCREATIC CANCER: ICD-10-CM

## 2025-06-03 DIAGNOSIS — C68.0: ICD-10-CM

## 2025-06-03 DIAGNOSIS — C61 PROSTATE CANCER: ICD-10-CM

## 2025-06-03 PROCEDURE — 99999 PR PBB SHADOW E&M-EST. PATIENT-LVL II: CPT | Mod: PBBFAC,,, | Performed by: BEHAVIOR TECHNICIAN

## 2025-06-03 PROCEDURE — 96041 GENETIC COUNSELING SVC EA 30: CPT | Mod: S$GLB,,, | Performed by: BEHAVIOR TECHNICIAN

## 2025-06-03 PROCEDURE — 36415 COLL VENOUS BLD VENIPUNCTURE: CPT | Mod: PN

## 2025-06-03 PROCEDURE — 99499 UNLISTED E&M SERVICE: CPT | Mod: S$GLB,,, | Performed by: BEHAVIOR TECHNICIAN

## 2025-06-04 ENCOUNTER — PATIENT MESSAGE (OUTPATIENT)
Dept: HEMATOLOGY/ONCOLOGY | Facility: CLINIC | Age: 80
End: 2025-06-04
Payer: MEDICARE

## 2025-06-06 ENCOUNTER — TELEPHONE (OUTPATIENT)
Dept: HEMATOLOGY/ONCOLOGY | Facility: CLINIC | Age: 80
End: 2025-06-06
Payer: MEDICARE

## 2025-06-11 DIAGNOSIS — C67.8 MALIGNANT NEOPLASM OF OVERLAPPING SITES OF BLADDER: ICD-10-CM

## 2025-06-11 RX ORDER — TAMSULOSIN HYDROCHLORIDE 0.4 MG/1
1 CAPSULE ORAL NIGHTLY
Qty: 90 CAPSULE | Refills: 3 | Status: SHIPPED | OUTPATIENT
Start: 2025-06-11

## 2025-06-12 ENCOUNTER — INFUSION (OUTPATIENT)
Dept: INFUSION THERAPY | Facility: HOSPITAL | Age: 80
End: 2025-06-12
Attending: INTERNAL MEDICINE
Payer: MEDICARE

## 2025-06-12 VITALS
SYSTOLIC BLOOD PRESSURE: 129 MMHG | RESPIRATION RATE: 18 BRPM | OXYGEN SATURATION: 99 % | TEMPERATURE: 98 F | HEART RATE: 84 BPM | DIASTOLIC BLOOD PRESSURE: 87 MMHG

## 2025-06-12 DIAGNOSIS — C61 PROSTATE CANCER: Primary | ICD-10-CM

## 2025-06-12 LAB
GENE DIS ANL INTERP-IMP: NORMAL
TEMPUS GENES ANALYZED: NORMAL
TEMPUS INTERPRETATION REPORT: NORMAL
TEMPUS PORTAL: NORMAL

## 2025-06-12 PROCEDURE — 96402 CHEMO HORMON ANTINEOPL SQ/IM: CPT | Mod: PN

## 2025-06-12 PROCEDURE — 63600175 PHARM REV CODE 636 W HCPCS: Mod: JZ,TB,PN | Performed by: INTERNAL MEDICINE

## 2025-06-12 RX ADMIN — LEUPROLIDE ACETATE 22.5 MG: KIT at 03:06

## 2025-06-13 ENCOUNTER — TELEPHONE (OUTPATIENT)
Dept: HEMATOLOGY/ONCOLOGY | Facility: CLINIC | Age: 80
End: 2025-06-13
Payer: MEDICARE

## 2025-06-13 NOTE — TELEPHONE ENCOUNTER
"Impression    Nate Bah's panel genetic testing was negative for actionable mutations in 40 genes associated with hereditary breast, gastrointestinal, gynecologic, pancreatic, prostate and other cancers. Results were disclosed over the phone on 6/13/2025.    Discussion    Genetic Test Results    Nate Bah had a sample submitted on 6/3/2025 to [facility] for xG with RNA panel testing. This panel includes sequencing and/or deletion/duplication analysis of the following 40 genes:    APC, BRUCE, AXIN2, BAP1, BARD1, BMPR1A, BRCA1, BRCA2, BRIP1, CDH1, CDK4, CDKN2A, CHEK2, EPCAM, FH, FLCN, GREM1, HOXB13, MBD4, MET, MLH1, MSH2, MSH3, MSH6, MUTYH, NF1, NTHL1, PALB2, PMS2, POLD1, POLE, PTEN, RAD51C, RAD51D, RPS20, SMAD4, STK11, TP53, TSC1, TSC2, VHL     The results were negative for actionable mutations in any of these genes. This is considered a negative result, but it does not completely rule out a hereditary form of cancer in her family. Some individuals/families with cancer may have a mutation in a gene that is not included in this panel, and some may have mutations in one of these genes that is not detectable with our current technology. It could also be that Nate's personal and family history of cancer is not due to a hereditary cause.     Given his negative results the likelihood of a hereditary form of cancer has been drastically reduced for Mr. Sullivan and his family. He has undergone comprehensive hereditary cancer genetic testing, and no further genetic testing is recommended at this time.      Additionally, a variant of unknown significance (VUS) was identified. A VUS is a change in a gene that may or may not be related to cancer risk. However, there is not enough information available to determine if it is disease-causing/pathogenic ("positive") or benign ("negative").     The VUS was identified in the BRCA2 gene. However, it is unknown if the variant found in Nate, labeled BRCA2 [id number] [id number].[id number], is related " to cancer risk. It has been reported as VUS by two other labs and Likely Benign by one lab according to [employer]. Therefore, it is not recommended to alter management for Nate or his family at this time, based on this variant.    When this variant is reclassified, the laboratory will send our office an updated report.       Nate Bah received comprehensive genetic counseling regarding his negative genetic test results. Benefits and limitations were discussed, and she was provided with an electronic copy of his results report. He is encouraged to contact cancer genetics if there are any updates to her personal or family history, or if he has any questions or concerns.

## 2025-06-17 ENCOUNTER — PATIENT MESSAGE (OUTPATIENT)
Dept: HEMATOLOGY/ONCOLOGY | Facility: CLINIC | Age: 80
End: 2025-06-17
Payer: MEDICARE

## 2025-06-19 ENCOUNTER — HOSPITAL ENCOUNTER (OUTPATIENT)
Dept: RADIOLOGY | Facility: HOSPITAL | Age: 80
Discharge: HOME | End: 2025-06-19
Attending: INTERNAL MEDICINE
Payer: MEDICARE

## 2025-06-19 DIAGNOSIS — S22.068A OTHER FRACTURE OF T7-T8 THORACIC VERTEBRA, INITIAL ENCOUNTER FOR CLOSED FRACTURE: ICD-10-CM

## 2025-06-19 DIAGNOSIS — C61 PROSTATE CANCER: ICD-10-CM

## 2025-06-19 DIAGNOSIS — E66.9 OBESITY (BMI 30-39.9): ICD-10-CM

## 2025-06-19 PROCEDURE — 77092 TBS I&R FX RSK QHP: CPT | Mod: ,,, | Performed by: RADIOLOGY

## 2025-06-19 PROCEDURE — 77091 TBS TECHL CALCULATION ONLY: CPT | Mod: PO

## 2025-06-19 PROCEDURE — 77080 DXA BONE DENSITY AXIAL: CPT | Mod: 26,,, | Performed by: RADIOLOGY

## 2025-06-20 ENCOUNTER — TELEPHONE (OUTPATIENT)
Dept: HEMATOLOGY/ONCOLOGY | Facility: CLINIC | Age: 80
End: 2025-06-20
Payer: MEDICARE

## 2025-06-20 NOTE — TELEPHONE ENCOUNTER
Spoke with the patients damichi to schedule the first acupuncture appt. Insurance approved 12 visits. They voiced acceptance of date/time. They were made aware to arrive early to allow time for check in and complete paperwork. Location was reviewed.

## 2025-06-26 ENCOUNTER — CLINICAL SUPPORT (OUTPATIENT)
Dept: REHABILITATION | Facility: HOSPITAL | Age: 80
End: 2025-06-26
Payer: MEDICARE

## 2025-06-26 DIAGNOSIS — M54.59 OTHER LOW BACK PAIN: ICD-10-CM

## 2025-06-26 PROCEDURE — 97810 ACUP 1/> WO ESTIM 1ST 15 MIN: CPT | Mod: HCNC,PN | Performed by: ACUPUNCTURIST

## 2025-06-26 PROCEDURE — 97811 ACUP 1/> W/O ESTIM EA ADD 15: CPT | Mod: HCNC,PN | Performed by: ACUPUNCTURIST
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
## 2025-06-30 ENCOUNTER — HOSPITAL ENCOUNTER (OUTPATIENT)
Dept: RADIOLOGY | Facility: HOSPITAL | Age: 80
Discharge: HOME | End: 2025-06-30
Attending: NEUROLOGICAL SURGERY
Payer: MEDICARE

## 2025-06-30 ENCOUNTER — OFFICE VISIT (OUTPATIENT)
Dept: NEUROSURGERY | Facility: CLINIC | Age: 80
End: 2025-06-30
Attending: NEUROLOGICAL SURGERY
Payer: MEDICARE

## 2025-06-30 VITALS
HEIGHT: 73 IN | HEART RATE: 94 BPM | WEIGHT: 272.25 LBS | DIASTOLIC BLOOD PRESSURE: 88 MMHG | BODY MASS INDEX: 36.08 KG/M2 | RESPIRATION RATE: 18 BRPM | SYSTOLIC BLOOD PRESSURE: 133 MMHG

## 2025-06-30 DIAGNOSIS — M54.6 PAIN IN THORACIC SPINE: Primary | ICD-10-CM

## 2025-06-30 DIAGNOSIS — S22.068A OTHER CLOSED FRACTURE OF EIGHTH THORACIC VERTEBRA, INITIAL ENCOUNTER: ICD-10-CM

## 2025-06-30 PROCEDURE — 72128 CT CHEST SPINE W/O DYE: CPT | Mod: TC,HCNC,PO

## 2025-06-30 PROCEDURE — 1159F MED LIST DOCD IN RCRD: CPT | Mod: CPTII,HCNC,S$GLB, | Performed by: NEUROLOGICAL SURGERY

## 2025-06-30 PROCEDURE — 72128 CT CHEST SPINE W/O DYE: CPT | Mod: 26,HCNC,, | Performed by: RADIOLOGY

## 2025-06-30 PROCEDURE — 99214 OFFICE O/P EST MOD 30 MIN: CPT | Mod: HCNC,S$GLB,, | Performed by: NEUROLOGICAL SURGERY

## 2025-06-30 PROCEDURE — 1101F PT FALLS ASSESS-DOCD LE1/YR: CPT | Mod: CPTII,HCNC,S$GLB, | Performed by: NEUROLOGICAL SURGERY

## 2025-06-30 PROCEDURE — 3079F DIAST BP 80-89 MM HG: CPT | Mod: CPTII,HCNC,S$GLB, | Performed by: NEUROLOGICAL SURGERY

## 2025-06-30 PROCEDURE — 1125F AMNT PAIN NOTED PAIN PRSNT: CPT | Mod: CPTII,HCNC,S$GLB, | Performed by: NEUROLOGICAL SURGERY

## 2025-06-30 PROCEDURE — 3288F FALL RISK ASSESSMENT DOCD: CPT | Mod: CPTII,HCNC,S$GLB, | Performed by: NEUROLOGICAL SURGERY

## 2025-06-30 PROCEDURE — 3075F SYST BP GE 130 - 139MM HG: CPT | Mod: CPTII,HCNC,S$GLB, | Performed by: NEUROLOGICAL SURGERY

## 2025-07-01 ENCOUNTER — CLINICAL SUPPORT (OUTPATIENT)
Dept: REHABILITATION | Facility: HOSPITAL | Age: 80
End: 2025-07-01
Payer: MEDICARE

## 2025-07-01 DIAGNOSIS — M54.59 OTHER LOW BACK PAIN: Primary | ICD-10-CM

## 2025-07-01 PROCEDURE — 97811 ACUP 1/> W/O ESTIM EA ADD 15: CPT | Mod: HCNC,PN | Performed by: ACUPUNCTURIST

## 2025-07-01 PROCEDURE — 97810 ACUP 1/> WO ESTIM 1ST 15 MIN: CPT | Mod: HCNC,PN | Performed by: ACUPUNCTURIST

## 2025-07-01 NOTE — PROGRESS NOTES
Acupuncture Follow-Up Note     Name: Nate Bah  Clinic Number: [id number]    Traditional Chinese Medicine (TCM) Diagnosis: Qi Stagnation, Blood Stasis, Qi Deficiency, Blood Deficiency, Wind , Damp, and Yin Deficiency  Medical Diagnosis:   1. Other low back pain         Evaluation Date: 7/1/2025    Visit #/Visits authorized: 2/12    Precautions: Standard    Subjective     Chief Concern: Other low back pain (Patient reports 4/10 low back pain with radiculopathy but not sciatica. )       Medical necessity is demonstrated by the following IMPAIRMENTS: Medical Necessity: Decreased mobility limits day to day activities, social, and emergent situations              Aggravating Factors:  movement     Relieving Factors:  nothing before acupuncture     Symptom Description:     Quality:  Aching  Severity:  4  Frequency:  every day      Objective     Observation: Patient is responding well to previous treatments and plans to continue as recommended.      Pulse:        tight       New Findings:  na    Treatment     Treatment Principles:  move qi and blood, relieve bi, calm jameson     Acupuncture points used:  4 HANDLEY, Gb20, and Gb21    Bilateral points:Ub 23-26 and Gb 29  Unilateral points:  Auricular Treatment:  jameson men    Needles In: 20  Needles Out: 20  Needles W/O STIM placed: 1035  Needles W/O STIM removed: 1055      Other Traditional Chinese Medicine Modalities - Cupping  Infrared heat    Assessment     After treatment, patient felt relief and plans to continue as recommended.       Patient prognosis is Good.     Patient will continue to benefit from acupuncture treatment to address the deficits listed in the problem list box on initial evaluation, provide patient family education and to maximize pt's level of independence in the home and community environment.     Patient's spiritual, cultural and educational needs considered and pt agreeable to plan of care and goals.     Anticipated barriers to treatment:  none    Plan     Recommend 2 /week for 6 sessions then re-assess.      Education:  Patient is aware of cumulative benefit of acupuncture

## 2025-07-02 ENCOUNTER — TELEPHONE (OUTPATIENT)
Dept: HEMATOLOGY/ONCOLOGY | Facility: CLINIC | Age: 80
End: 2025-07-02
Payer: MEDICARE

## 2025-07-02 NOTE — TELEPHONE ENCOUNTER
Canceled acup appt for 7/3/25 as per pt request. Pt already has an appt scheduled for acup for next week.    Copied from CRM #[id number]. Topic: General Inquiry - Patient Advice  >> Jul 2, 2025 11:22 AM Abebe wrote:  Type: Needs Medical Advice  Who Called:  Maye   Symptoms (please be specific):    How long has patient had these symptoms:    Pharmacy name and phone #:    Best Call Back Number: [phone]  Additional Information: Maye called to cancel pt appt for 7/3

## 2025-07-08 ENCOUNTER — TELEPHONE (OUTPATIENT)
Dept: REHABILITATION | Facility: HOSPITAL | Age: 80
End: 2025-07-08
Payer: MEDICARE

## 2025-07-08 NOTE — TELEPHONE ENCOUNTER
Returned call to pt's dtr Maye; pt is not feeling well today and want to cancel his acup apt. Pt will keep his already scheduled apt on 7/10/25 at 10:30 am. Pt will receive a reminder call prior to his apt.

## 2025-07-09 ENCOUNTER — TELEPHONE (OUTPATIENT)
Dept: HEMATOLOGY/ONCOLOGY | Facility: CLINIC | Age: 80
End: 2025-07-09
Payer: MEDICARE

## 2025-07-09 PROBLEM — M85.89 OSTEOPENIA OF MULTIPLE SITES: Status: ACTIVE | Noted: 2025-07-09

## 2025-07-09 PROBLEM — D69.6 THROMBOCYTOPENIA: Status: ACTIVE | Noted: 2025-07-09

## 2025-07-09 PROBLEM — R79.89 ABNORMAL LFTS: Status: ACTIVE | Noted: 2025-07-09

## 2025-07-09 NOTE — TELEPHONE ENCOUNTER
LMOVM for pt to remind of appt tomorrow with Dr. Qureshi. Asked pt if unable to attend to please let us know, All contact info was left for pt.

## 2025-07-10 ENCOUNTER — PATIENT MESSAGE (OUTPATIENT)
Dept: UROLOGY | Facility: CLINIC | Age: 80
End: 2025-07-10
Payer: MEDICARE

## 2025-07-10 ENCOUNTER — PATIENT MESSAGE (OUTPATIENT)
Dept: HEMATOLOGY/ONCOLOGY | Facility: CLINIC | Age: 80
End: 2025-07-10
Payer: MEDICARE

## 2025-07-10 PROBLEM — R19.7 DIARRHEA OF PRESUMED INFECTIOUS ORIGIN: Status: ACTIVE | Noted: 2025-07-10

## 2025-07-10 PROBLEM — R57.1 HYPOVOLEMIC SHOCK: Status: ACTIVE | Noted: 2025-07-10

## 2025-07-14 PROBLEM — K56.609 SMALL BOWEL OBSTRUCTION: Status: ACTIVE | Noted: 2025-07-14

## 2025-07-14 PROBLEM — E66.9 OBESITY: Status: ACTIVE | Noted: 2025-07-14

## 2025-07-14 PROBLEM — D64.9 ANEMIA: Status: ACTIVE | Noted: 2025-07-14

## 2025-07-15 PROBLEM — E87.6 HYPOKALEMIA: Status: ACTIVE | Noted: 2025-07-15

## 2025-07-15 PROBLEM — E87.0 HYPERNATREMIA: Status: ACTIVE | Noted: 2025-07-15

## 2025-07-17 PROBLEM — E87.3 METABOLIC ALKALOSIS: Status: ACTIVE | Noted: 2025-07-17

## 2025-07-18 ENCOUNTER — TELEPHONE (OUTPATIENT)
Dept: HEMATOLOGY/ONCOLOGY | Facility: CLINIC | Age: 80
End: 2025-07-18
Payer: MEDICARE

## 2025-07-18 NOTE — TELEPHONE ENCOUNTER
Copied from CRM #[id number]. Topic: General Inquiry - Patient Advice  >> Jul 18, 2025  2:07 PM Kiara wrote:  Type:  Needs Medical Advice    Who Called: Patricia magallon    Would the patient rather a call back or a response via [employer]?   Best Call Back Number: [phone]   Additional Information: pt need hospital f/u in 2wks

## 2025-07-24 ENCOUNTER — TELEPHONE (OUTPATIENT)
Dept: FAMILY MEDICINE | Facility: CLINIC | Age: 80
End: 2025-07-24
Payer: MEDICARE

## 2025-07-25 ENCOUNTER — OFFICE VISIT (OUTPATIENT)
Dept: FAMILY MEDICINE | Facility: CLINIC | Age: 80
End: 2025-07-25
Payer: MEDICARE

## 2025-07-25 VITALS
HEIGHT: 73 IN | BODY MASS INDEX: 33.75 KG/M2 | SYSTOLIC BLOOD PRESSURE: 104 MMHG | DIASTOLIC BLOOD PRESSURE: 60 MMHG | WEIGHT: 254.63 LBS | OXYGEN SATURATION: 99 % | HEART RATE: 94 BPM

## 2025-07-25 DIAGNOSIS — R45.89 ANXIETY ABOUT HEALTH: ICD-10-CM

## 2025-07-25 DIAGNOSIS — C61 PROSTATE CANCER: ICD-10-CM

## 2025-07-25 DIAGNOSIS — Z09 HOSPITAL DISCHARGE FOLLOW-UP: Primary | ICD-10-CM

## 2025-07-25 DIAGNOSIS — Z87.19 HISTORY OF SMALL BOWEL OBSTRUCTION: ICD-10-CM

## 2025-07-25 DIAGNOSIS — G47.00 INSOMNIA, UNSPECIFIED TYPE: ICD-10-CM

## 2025-07-25 DIAGNOSIS — C67.8 MALIGNANT NEOPLASM OF OVERLAPPING SITES OF BLADDER: ICD-10-CM

## 2025-07-25 PROCEDURE — 99999 PR PBB SHADOW E&M-EST. PATIENT-LVL V: CPT | Mod: PBBFAC,HCNC,, | Performed by: PHYSICIAN ASSISTANT

## 2025-07-25 RX ORDER — ALPRAZOLAM 0.5 MG/1
0.5 TABLET ORAL NIGHTLY PRN
Qty: 30 TABLET | Refills: 0 | Status: SHIPPED | OUTPATIENT
Start: 2025-07-25 | End: 2025-07-25

## 2025-07-25 RX ORDER — ALPRAZOLAM 0.5 MG/1
0.25 TABLET ORAL NIGHTLY PRN
Qty: 30 TABLET | Refills: 0 | Status: SHIPPED | OUTPATIENT
Start: 2025-07-25 | End: 2025-08-24

## 2025-07-25 NOTE — PROGRESS NOTES
"Subjective     Patient ID: Nate Bah is a 79 y.o. male.    Chief Complaint: Follow-up (Hospital follow up)      HPI      Pt is known to me, PCP Dr. Parekh.      Pt is a 79 year old male with T2DM,  CAD with CABG hx, hx of watchman procedure for A-fib, pacemaker presence, prostate ca,  CKD, bladder ca, obesity, GERD, gout, hx of nicotine dependence. He presents today for hospital follow up. Admitted to [facility] from 7/10-7/18. Course as follows:      "Patient initially admitted for hypovolemic shock secondary to intravascular volume depletion from diarrhea with associated acute kidney injury.  CT abdomen and pelvis on 07/12 revealed small bowel obstruction.  NG tube placed to low intermittent wall suction and general surgery consulted.  GI pathogen panel positive for Campylobacter and patient on azithromycin.  Patient now hemodynamically stable and renal function normalized following IV fluids.  KUB on 07/14 revealed persistent dilated small bowel.  Patient hypokalemic and hypernatremic on 07/15 and IV fluids changed with repeat chemistry to monitor.  Repleting potassium/phos.  SBO slow to recover.   07/18  Pt is stable for discharge home stable if he tolerates oral intake.  F/u with own pcp and hem/onc in 1-2 wks.   Ad nitin as tolerated.  Medications as per reconciliation form.  Cardiac diet. "    Pt has home health nursing and PT. Saw GI on Tuesday, [facility]. They took him off of pantoprazole and onto vonoprazan. CT scan with IV contrast scheduled for 8/13. Pt hoping to move this sooner if possible.  EGD scheduled for 9/9. Abdominal pain is improving, but not gone. Eating and drinking, slowly advancing diet. Stool is getting more firm. Passing gas. Urinating without issue. No fever or chills. His biggest concern is the acute anxiety and insomnia that is occurring due to his current health problems. Tried trazodone for sleep, and it made him very confused the next morning.         Review of Systems   All other " systems reviewed and are negative.         Objective     Physical Exam  Constitutional:       General: He is not in acute distress.     Appearance: Normal appearance. He is not ill-appearing, toxic-appearing or diaphoretic.   HENT:      Head: Normocephalic and atraumatic.   Cardiovascular:      Heart sounds: Normal heart sounds. No murmur heard.     No friction rub. No gallop.   Pulmonary:      Effort: No respiratory distress.      Breath sounds: Normal breath sounds. No stridor. No wheezing, rhonchi or rales.   Chest:      Chest wall: No tenderness.   Abdominal:      General: Bowel sounds are normal. There is distension.      Palpations: There is no mass.      Tenderness: There is no abdominal tenderness. There is no right CVA tenderness, left CVA tenderness, guarding or rebound.      Hernia: No hernia is present.   Musculoskeletal:      Right lower leg: No edema.      Left lower leg: No edema.   Neurological:      General: No focal deficit present.      Mental Status: He is alert and oriented to person, place, and time. Mental status is at baseline.   Psychiatric:         Mood and Affect: Mood normal.         Behavior: Behavior normal.         Thought Content: Thought content normal.         Judgment: Judgment normal.       1. Hospital discharge follow-up (Primary)  -reviewed discharge paperwork    2. History of small bowel obstruction  -continue with GI, slowly advance diet. Was able to rechedule his CT for 1 week sooner  - CBC Auto Differential; Future  - Comprehensive Metabolic Panel; Future    3. Anxiety about health  4. Insomnia, unspecified type  - ALPRAZolam (XANAX) 0.5 MG tablet; Take 1/2 tablet (0.25 mg total) by mouth nightly as needed for Anxiety or Insomnia.  Dispense: 30 tablet; Refill: 0    5. Malignant neoplasm of overlapping sites of bladder  6. Prostate cancer  -followed by urology and heme onc      RTC/ER precautions given. F/U with me prn, with urology, heme onc and GI as scheduled. F/U with   [facility] in 1-3 months    Lavinia Reeves PA-C

## 2025-07-25 NOTE — PATIENT INSTRUCTIONS
A few reminders from today:    Labs today  Try xanax at night for sleep    Follow up with me if needed.   Please go to ER/urgent care if symptoms persist/worsen, especially if after hours.     Do not hesitate to get in touch with me should you have any further questions.     Thank you for trusting me with your care!  I wish you health and happiness.    -Lavinia Reeves PA-C'

## 2025-07-30 ENCOUNTER — PATIENT MESSAGE (OUTPATIENT)
Dept: RADIATION ONCOLOGY | Facility: CLINIC | Age: 80
End: 2025-07-30
Payer: MEDICARE

## 2025-07-30 ENCOUNTER — OFFICE VISIT (OUTPATIENT)
Dept: HEMATOLOGY/ONCOLOGY | Facility: CLINIC | Age: 80
End: 2025-07-30
Payer: MEDICARE

## 2025-07-30 DIAGNOSIS — R79.89 ABNORMAL LFTS: ICD-10-CM

## 2025-07-30 DIAGNOSIS — Z80.0 FAMILY HISTORY OF PANCREATIC CANCER: ICD-10-CM

## 2025-07-30 DIAGNOSIS — N18.31 STAGE 3A CHRONIC KIDNEY DISEASE: ICD-10-CM

## 2025-07-30 DIAGNOSIS — I44.1 HEART BLOCK AV SECOND DEGREE: ICD-10-CM

## 2025-07-30 DIAGNOSIS — C61 PROSTATE CANCER: Primary | ICD-10-CM

## 2025-07-30 DIAGNOSIS — I25.10 CORONARY ARTERY DISEASE DUE TO LIPID RICH PLAQUE: ICD-10-CM

## 2025-07-30 DIAGNOSIS — C67.8 MALIGNANT NEOPLASM OF OVERLAPPING SITES OF BLADDER: ICD-10-CM

## 2025-07-30 DIAGNOSIS — I25.83 CORONARY ARTERY DISEASE DUE TO LIPID RICH PLAQUE: ICD-10-CM

## 2025-07-30 PROCEDURE — G2211 COMPLEX E/M VISIT ADD ON: HCPCS | Mod: HCNC,95,, | Performed by: INTERNAL MEDICINE

## 2025-07-30 PROCEDURE — 98006 SYNCH AUDIO-VIDEO EST MOD 30: CPT | Mod: HCNC,95,, | Performed by: INTERNAL MEDICINE

## 2025-07-30 NOTE — PROGRESS NOTES
The patient location is: [place]  FOLLOW UP TELEMEDICINE VISIT    Subjective:      Patient ID: Nate Bah is a 79 y.o. male.  MRN: [id number]  : 1945    An audio and visual care visit was performed with the patient because of the COVID-19 pandemic recommendations for social distancing.    TELEMEDICINE  The patient location is: home  The chief complaint leading to consultation is: Prostate cancer  Visit type: Virtual visit with synchronous audio and video    Total time spent with patient: 10 minutes  35 minutes of total time spent on the encounter, which includes face to face time and non-face to face time preparing to see the patient (eg, review of tests), obtaining and/or reviewing separately obtained history, documenting clinical information in the electronic or other health record, independently interpreting results (not separately reported) and communicating results to the patient/family/caregiver, or care coordination (not separately reported).    Each patient to whom he or she provides medical services by telemedicine is:  (1) informed of the relationship between the physician and patient and the respective role of any other health care provider with respect to management of the patient; and (2) notified that he or she may decline to receive medical services by telemedicine and may withdraw from such care at any time.    History of Present Illness:   HPI Nate Bah is a 79 y.o. male presents for evaluation of Prostate Cancer    Cancer Staging   Prostate cancer  Staging form: Prostate, AJCC 8th Edition  - Pathologic stage from 2025: Stage IIC (pT2, pN0, cM0, PSA: 8.7, Grade Group: 4) - Signed by Sherin Tapia MD on 2025  Patient is reporting to this visit with his wife.      He is recovering from his recent admission to the hospital for his short bowel obstruction.  He is undergoing physical therapy to help him recover from the weakness and deconditioning due to his prolonged stay  in the hospital.      The patient denies CP, cough, SOB, abdominal pain, nausea, vomiting, constipation.  The patient denies fever, chills, night sweats, weight loss, new lumps or bumps, easy bruising or bleeding.      ONCOLOGIC HISTORY:  3/10/25 MRI prostate noted:  PI-RADS 4 lesion 1.9cm med and lat segs of right PZ from base to apex with central necrosis, restricted diffusion, and peripheral enhancement     Pt admitted for IV abx for osteomyelitis.     On 5/2/25 he underwent TPUS-guided biopsy of the prostate, with pathology as follows:  RPM: GG 4 in 2 of 2  RANDI: GG 4 in 2 of 2 cores; granulomatous prostatitis with extensive necrosis  Lesion 1: GG 4 in 2 of 3     5/22/25 PSMA/PET (GA68):  Low level activity within the central gland, otherwise no evidence of malignancy     Family history.    Mother and maternal uncle had both pancreatic cancer.    Oncology History:  Oncology History   Prostate cancer   3/25/2025 Initial Diagnosis    Prostate cancer     5/30/2025 Cancer Staged    Staging form: Prostate, AJCC 8th Edition  - Pathologic stage from 5/30/2025: Stage IIC (pT2, pN0, cM0, PSA: 8.7, Grade Group: 4)     6/12/2025 -  Chemotherapy    Treatment Summary   Plan Name: OP PROSTATE LEUPROLIDE Q3MO  Treatment Goal: Curative  Status: Active  Start Date: 6/12/2025  End Date: 12/23/2027 (Planned)  Provider: Sherin Tapia MD  Chemotherapy: leuprolide (LUPRON) injection 22.5 mg, 22.5 mg, Intramuscular, Clinic/HOD 1 time, 1 of 12 cycles  Administration: 22.5 mg (6/12/2025)        Past medical, surgical, family, and social history were reviewed today and there are no changes of note unless mentioned in HPI.   MEDS and ALLERGIES were reviewed with patient and meds reconciled.     History:  Past Medical History:   Diagnosis Date    JACKSON (acute kidney injury) 12/04/2016    Anticoagulant long-term use     BPH (benign prostatic hyperplasia)     Cancer     skin cancer to arms; bladder cancer    Coronary artery disease     afib,  stents, pacemaker watchman  stents x 5    Diabetes mellitus     Edema     Elevated PSA     Esophagus disorder     esophagus spasms    GERD without esophagitis     Gout     Gross hematuria 07/30/2024    Hypertension     Mixed hyperlipidemia     Obesity (BMI 30-39.9)     Paroxysmal atrial fibrillation     Salmonella gastroenteritis 01/17/2023    Sleep apnea     no Cpap      Past Surgical History:   Procedure Laterality Date    ANGIOGRAM, CORONARY, WITH LEFT HEART CATHETERIZATION Left 05/03/2021    Procedure: Angiogram, Coronary, with Left Heart Cath;  Surgeon: Flakito Winter MD;  Location: [facility] CATH;  Service: Cardiology;  Laterality: Left;    BLADDER FULGURATION N/A 07/31/2024    Procedure: FULGURATION, BLADDER;  Surgeon: Lazaro Julian MD;  Location: [facility] OR;  Service: Urology;  Laterality: N/A;    CARDIAC SURGERY  2003    CABG 1 vessel    CHOLECYSTECTOMY      CLOSURE OF LEFT ATRIAL APPENDAGE USING DEVICE  02/28/2024    Procedure: Watchman;  Surgeon: Fritz Del Valle MD;  [facility];  Service: Cardiology;;    COLON SURGERY  2007?    resection /perforated colon    COLONOSCOPY N/A 1/16/2025    Procedure: COLONOSCOPY;  Surgeon: Mayank Olmstead Jr., MD;  Location: [facility] ENDO;  Service: Endoscopy;  Laterality: N/A;    CORONARY ANGIOGRAPHY N/A 07/24/2018    Procedure: Coronary angiography;  Surgeon: Italo Mckeon MD;  Location: [facility] CATH;  Service: Cardiology;  Laterality: N/A;    CORONARY ANGIOPLASTY WITH STENT PLACEMENT      5 stents, last one 2015    CORONARY STENT PLACEMENT N/A 07/24/2018    Procedure: INSERTION, STENT, CORONARY ARTERY;  Surgeon: Italo Mckeon MD;  [facility];  Service: Cardiology;  Laterality: N/A;    CYSTOSCOPY N/A 07/31/2024    Procedure: CYSTOSCOPY;  Surgeon: Lazaro Julian MD;  Location: [facility] OR;  Service: Urology;  Laterality: N/A;    CYSTOSCOPY W/ RETROGRADES Right 07/10/2024    Procedure: CYSTOSCOPY, WITH RETROGRADE PYELOGRAM;  Surgeon: Lazaro Julian MD;  Location: [facility] OR;   Service: Urology;  Laterality: Right;    ECHOCARDIOGRAM,TRANSESOPHAGEAL  02/28/2024    Procedure: (TESFAYE) intra-procedure;  Surgeon: Adalgisa Goodson MD;  Location: [facility];  Service: Cardiology;;    ECHOCARDIOGRAM,TRANSESOPHAGEAL N/A 04/01/2024    Procedure: Transesophageal echo (TESFAYE) intra-procedure log documentation;  Surgeon: Flakito Winter MD;  [facility];  Service: Cardiology;  Laterality: N/A;  6 week post-implant TESFAYE for Watchman    ECHOCARDIOGRAM,TRANSESOPHAGEAL N/A 10/14/2024    Procedure: Transesophageal echo (TESFAYE) intra-procedure log documentation;  Surgeon: Flakito Winter MD;  Location: [facility];  Service: Cardiology;  Laterality: N/A;  6 month post-implant TESFAYE for Watchman    ESOPHAGEAL DILATION N/A 1/16/2025    Procedure: DILATION, ESOPHAGUS;  Surgeon: Mayank Olmstead Jr., MD;  Location: [facility];  Service: Endoscopy;  Laterality: N/A;    ESOPHAGOGASTRODUODENOSCOPY N/A 1/16/2025    Procedure: EGD (ESOPHAGOGASTRODUODENOSCOPY);  Surgeon: Mayank Olmstead Jr., MD;  Location: [facility];  Service: Endoscopy;  Laterality: N/A;    EYE SURGERY      cataracts    FOOT SURGERY Right     with hardware    HEMORRHOID SURGERY      INCISION OF PERIRECTAL ABSCESS N/A 03/25/2020    Procedure: INCISION, ABSCESS, PERIRECTAL;  Surgeon: Nayan Mack MD;  Location: [facility];  Service: General;  Laterality: N/A;    INSERTION OF PACEMAKER Left 05/04/2021    Procedure: INSERTION, PACEMAKER;  Surgeon: Flakito Winter MD;  Location: [facility] CATH;  Service: Cardiology;  Laterality: Left;    INSTILLATION OF URINARY BLADDER N/A 07/10/2024    Procedure: INSTILLATION, BLADDER;  Surgeon: Lazaro Julian MD;  Location: [facility] OR;  Service: Urology;  Laterality: N/A;  Gemcitabine    JOINT REPLACEMENT Left     knee    KNEE ARTHROSCOPY W/ MENISCAL REPAIR Right     KYPHOSIS SURGERY      LEFT HEART CATHETERIZATION N/A 07/24/2018    Procedure: Left heart cath;  Surgeon: Italo Mckeon MD;  Location: [facility] CATH;  Service:  Cardiology;  Laterality: N/A;    PROSTATE BIOPSY N/A 5/2/2025    Procedure: BIOPSY, TRANSPERINEAL PROSTATE;  Surgeon: Lazaro Julian MD;  Location: [facility] OR;  Service: Urology;  Laterality: N/A;  TRANSPERINEAL    REVISION OF PROCEDURE INVOLVING PACEMAKER LEAD  11/11/2024    Procedure: Lead Revision (Gee);  Surgeon: Flakito Winter MD;  Location: [facility] CATH;  Service: Cardiology;;    TRANSURETHRAL RESECTION OF BLADDER TUMOR BY BIPOLAR CAUTERY N/A 07/10/2024    Procedure: TURBT, USING BIPOLAR CAUTERY;  Surgeon: Lazaro Julian MD;  Location: [facility] OR;  Service: Urology;  Laterality: N/A;     Family History   Problem Relation Name Age of Onset    Pancreatic cancer Mother Jeniffer 77    Heart disease Father      No Known Problems Brother Pedro     Pancreatic cancer Maternal Uncle Marty     Bladder Cancer Paternal Uncle Bernard 75        chemo      Social History     Tobacco Use    Smoking status: Former     Current packs/day: 0.00     Average packs/day: 1 pack/day for 25.0 years (25.0 ttl pk-yrs)     Types: Cigarettes     Start date: 1985     Quit date: 2010     Years since quitting: 15.5    Smokeless tobacco: Never   Substance and Sexual Activity    Alcohol use: Not Currently     Comment: occasional    Drug use: No    Sexual activity: Yes     Partners: Female        ROS:  Answers submitted by the patient for this visit:  Review of Systems Questionnaire (Submitted on 7/23/2025)  appetite change : No  unexpected weight change: No  mouth sores: No  visual disturbance: Yes  cough: No  shortness of breath: Yes  chest pain: No  abdominal pain: Yes  diarrhea: No  frequency: No  back pain: Yes  rash: No  headaches: No  adenopathy: No  nervous/ anxious: No      Objective:   There were no vitals filed for this visit.  Wt Readings from Last 10 Encounters:   07/25/25 115.5 kg (254 lb 10.1 oz)   07/19/25 123.4 kg (272 lb)   07/10/25 123.5 kg (272 lb 4.3 oz)   06/30/25 123.5 kg (272 lb 4.3 oz)   06/02/25 123.5 kg (272 lb 4.8 oz)    05/30/25 124.1 kg (273 lb 9.5 oz)   05/23/25 124.4 kg (274 lb 4 oz)   05/23/25 124.4 kg (274 lb 4 oz)   05/19/25 127.9 kg (281 lb 15.5 oz)   05/13/25 127.9 kg (281 lb 15.5 oz)       Physical Examination:   Constitutional: he is alert, pleasant, and does not appear to be in any physical distress   HENT: Mouth/Throat:  Tongue is midline without evidence of glossitis  Eyes: No obvious jaundice or conjunctivitis.  EOM are normal.   Pulmonary/Chest: No stridor noted. No excess chest muscle movement.  Neurological: he is alert and oriented to person, place, and time. No cranial nerve deficit.  Skin:  No rash noted. No erythema.   Psychiatric: he has a normal mood and affect. Speech and memory normal.     Diagnostic Tests:  Significant Imaging:  I have reviewed and interpreted all pertinent imaging results/findings.    Laboratory Data:  Lab Results   Component Value Date    WBC 7.85 07/22/2025    HGB 13.2 (L) 07/22/2025    HCT 38.1 (L) 07/22/2025     07/22/2025    CHOL 102 (L) 07/17/2025    TRIG 215 (H) 07/17/2025    HDL 8 (L) 07/17/2025    ALT 39 07/22/2025    AST 48 (H) 07/22/2025     07/22/2025    K 3.8 07/22/2025    CL 98 07/22/2025    CREATININE 1.33 07/22/2025    BUN 20 07/22/2025    CO2 30 (H) 07/22/2025    TSH 2.520 05/02/2021    PSA 8.72 (H) 05/22/2025    INR 1.3 07/18/2025    HGBA1C 6.1 (H) 07/10/2025        Labs:   Lab Results   Component Value Date    WBC 7.85 07/22/2025    RBC 4.33 (L) 07/22/2025    HGB 13.2 (L) 07/22/2025    HCT 38.1 (L) 07/22/2025    MCV 88 07/22/2025     07/22/2025     (H) 07/22/2025     07/22/2025    K 3.8 07/22/2025    BUN 20 07/22/2025    CREATININE 1.33 07/22/2025    AST 48 (H) 07/22/2025    ALT 39 07/22/2025    BILITOT 1.8 (H) 07/22/2025         Assessment/Plan:     ECOG SCORE    2 - Capable of all selfcare but unable to carry out any work activities, active > 50% of hours       Prostate cancer Grade Group 4, PSA 13.6, high risk risk adenocarcinoma of  the prostate.      -I reviewed independently the patient radiologic and pathologic findings.  I discussed with him a stage prognosis and plan of care.  In view of his local regional nonmetastatic disease the patient will benefit from local treatment with radiation  and ADT for 12-36 months.  -ADT is associated with a wide range of side effects that can significantly impair quality of life.  Important and/or frequent side effects include: Loss of lean body mass, increased body fat, and decreased muscle strength, sexual dysfunction, loss of bone mineral density, which can result in bone fracture due to osteoporosis, vasomotor instability, which is manifested by hot flashes, gynecomastia, decreased body hair, and smaller penile and/or testicular size, fatigue, behavioral and neurologic effects, cardiovascular and metabolic abnormalities.  -Dietary calcium intake (food and supplements) of 1000 to 1200 mg daily, supplemental vitamin D 800 to 1000 international units daily, and lifestyle modifications (weight-bearing exercise, decreased alcohol consumption, smoking discontinuation) are indicated for all males beginning ADT.  - If a GnRH agonist is chosen for initial therapy, it is recommended to prescribe a two to four weeks course of  concurrent antiandrogen therapy (termed complete androgen blockade [CAB]) to prevent disease flare.    - He received his first Lupron at a dose of 22.5 mg IM on 6/12/2025  -labs on 5/30/2025 showed testosterone 551 and Vitamin D50  -he will be seen back again in September before his next injection of Lupron with repeat CBC CMP testosterone and PSA.         Osteopenia:  Bone density on 6/19/2025 showed osteopenia with a 11% risk of a major osteoporotic fracture and a 3.5% risk of hip fracture in the next 10 years   RECOMMENDATIONS:  *Daily calcium intake 5358-9742 mg, dietary sources preferred; Vitamin D 4295-7834 IU daily.  *Weight bearing exercise and fall precautions.  *Repeat BMD in 2  years     Chronic kidney disease stage 3a  Crea 1.6  Avoid nephrotoxic medication increase p.o. intake and keep himself hydrated.    Follow up BMP.         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 Liver disease.    Coronary artery disease status post CABG status post PCI.    Medically stable.    Currently on aspirin he losartan and Crestor.    He was advised to continue to follow up with his cardiologist     High degree AV block :  symptomatic bradycardia requiring dual-chamber pacemaker placement      Chronic atrial fibrillation.    intermittent episodes with no recent atrial fibrillation on device interrogation  Watchman placed 2/24   -on  aspirin therapy        Family history of pancreatic cancer.    He was evaluated by the genetic counselor - His geentic testing showed no actionable mutation.       Personal history of bladder cancer.    7/10/2024 - TURBT - HG Ta, 2.5 cm, muscle present and not involved, intermediate risk  9/2024 - BCG induction 5/6  10/2024 - cysto and cytology [facility]  9-10/2024 - Induction BCG  1/2025 - cysto and cytology [facility]AL[facility]         Med Onc Chart Routing      Follow up with physician . September 10 with repeat CBC CMP testosterone and PSA  to be done a day before his appoitment   Follow up with [facility]    Infusion scheduling note    Injection scheduling note    Labs    Imaging    Pharmacy appointment    Other referrals                 Plan was discussed with the patient at length, and he verbalized understanding. Nate was given an opportunity to ask questions that were answered to his satisfaction, and he was advised to call in the interval if any problems or questions arise.    Electronically signed by Sherin Tapia MD

## 2025-08-01 ENCOUNTER — HOSPITAL ENCOUNTER (OUTPATIENT)
Dept: RADIATION THERAPY | Facility: HOSPITAL | Age: 80
Discharge: HOME | End: 2025-08-01
Attending: RADIOLOGY
Payer: MEDICARE

## 2025-08-07 ENCOUNTER — HOSPITAL ENCOUNTER (OUTPATIENT)
Dept: RADIOLOGY | Facility: HOSPITAL | Age: 80
Discharge: HOME | End: 2025-08-07
Attending: NURSE PRACTITIONER
Payer: MEDICARE

## 2025-08-07 DIAGNOSIS — R10.84 ABDOMINAL PAIN, GENERALIZED: ICD-10-CM

## 2025-08-07 DIAGNOSIS — R14.0 BLOATED ABDOMEN: ICD-10-CM

## 2025-08-07 DIAGNOSIS — R13.10 DYSPHAGIA: ICD-10-CM

## 2025-08-07 PROCEDURE — 74177 CT ABD & PELVIS W/CONTRAST: CPT | Mod: TC,HCNC,PO

## 2025-08-07 PROCEDURE — 25500020 PHARM REV CODE 255: Mod: HCNC,PO

## 2025-08-07 PROCEDURE — 74177 CT ABD & PELVIS W/CONTRAST: CPT | Mod: 26,HCNC,, | Performed by: RADIOLOGY

## 2025-08-07 PROCEDURE — A9698 NON-RAD CONTRAST MATERIALNOC: HCPCS | Mod: HCNC,PO

## 2025-08-07 RX ADMIN — IOHEXOL 1000 ML: 12 SOLUTION ORAL at 02:08

## 2025-08-07 RX ADMIN — IOHEXOL 100 ML: 350 INJECTION, SOLUTION INTRAVENOUS at 02:08

## 2025-08-15 ENCOUNTER — HOSPITAL ENCOUNTER (OUTPATIENT)
Dept: RADIATION THERAPY | Facility: HOSPITAL | Age: 80
Discharge: HOME | End: 2025-08-15
Attending: STUDENT IN AN ORGANIZED HEALTH CARE EDUCATION/TRAINING PROGRAM
Payer: MEDICARE

## 2025-08-15 ENCOUNTER — OFFICE VISIT (OUTPATIENT)
Dept: RADIATION ONCOLOGY | Facility: CLINIC | Age: 80
End: 2025-08-15
Payer: MEDICARE

## 2025-08-15 VITALS
HEART RATE: 72 BPM | BODY MASS INDEX: 34.83 KG/M2 | SYSTOLIC BLOOD PRESSURE: 199 MMHG | TEMPERATURE: 98 F | RESPIRATION RATE: 20 BRPM | WEIGHT: 262.81 LBS | OXYGEN SATURATION: 98 % | DIASTOLIC BLOOD PRESSURE: 88 MMHG | HEIGHT: 73 IN

## 2025-08-15 DIAGNOSIS — C61 PROSTATE CANCER: Primary | ICD-10-CM

## 2025-08-15 PROCEDURE — 99999 PR PBB SHADOW E&M-EST. PATIENT-LVL IV: CPT | Mod: PBBFAC,HCNC,, | Performed by: RADIOLOGY

## 2025-08-18 ENCOUNTER — DOCUMENTATION ONLY (OUTPATIENT)
Dept: HEMATOLOGY/ONCOLOGY | Facility: CLINIC | Age: 80
End: 2025-08-18
Payer: MEDICARE

## 2025-08-21 ENCOUNTER — HOSPITAL ENCOUNTER (OUTPATIENT)
Dept: RADIATION THERAPY | Facility: HOSPITAL | Age: 80
Discharge: HOME | End: 2025-08-21
Attending: STUDENT IN AN ORGANIZED HEALTH CARE EDUCATION/TRAINING PROGRAM
Payer: MEDICARE

## 2025-08-21 PROCEDURE — 77334 RADIATION TREATMENT AID(S): CPT | Mod: TC,HCNC,PN | Performed by: RADIOLOGY

## 2025-08-21 PROCEDURE — 77014 HC CT GUIDANCE RADIATION THERAPY FLDS PLACEMENT: CPT | Mod: TC,HCNC,PN | Performed by: RADIOLOGY

## 2025-08-21 PROCEDURE — 77014 PR  CT GUIDANCE PLACEMENT RAD THERAPY FIELDS: CPT | Mod: 26,HCNC,, | Performed by: RADIOLOGY

## 2025-08-21 PROCEDURE — 77334 RADIATION TREATMENT AID(S): CPT | Mod: 26,HCNC,, | Performed by: RADIOLOGY

## 2025-08-21 PROCEDURE — 77263 THER RADIOLOGY TX PLNG CPLX: CPT | Mod: HCNC,,, | Performed by: RADIOLOGY

## 2025-08-22 ENCOUNTER — OFFICE VISIT (OUTPATIENT)
Dept: NEPHROLOGY | Facility: CLINIC | Age: 80
End: 2025-08-22
Payer: MEDICARE

## 2025-08-22 VITALS — DIASTOLIC BLOOD PRESSURE: 50 MMHG | SYSTOLIC BLOOD PRESSURE: 110 MMHG | HEART RATE: 66 BPM | OXYGEN SATURATION: 99 %

## 2025-08-22 DIAGNOSIS — I10 ESSENTIAL HYPERTENSION: ICD-10-CM

## 2025-08-22 DIAGNOSIS — C61 ADENOCARCINOMA OF PROSTATE: ICD-10-CM

## 2025-08-22 DIAGNOSIS — E11.42 DIABETIC POLYNEUROPATHY ASSOCIATED WITH TYPE 2 DIABETES MELLITUS: ICD-10-CM

## 2025-08-22 DIAGNOSIS — E66.01 CLASS 2 SEVERE OBESITY DUE TO EXCESS CALORIES WITH SERIOUS COMORBIDITY AND BODY MASS INDEX (BMI) OF 36.0 TO 36.9 IN ADULT: ICD-10-CM

## 2025-08-22 DIAGNOSIS — R60.0 BILATERAL LOWER EXTREMITY EDEMA: ICD-10-CM

## 2025-08-22 DIAGNOSIS — N18.31 STAGE 3A CHRONIC KIDNEY DISEASE: Primary | ICD-10-CM

## 2025-08-22 DIAGNOSIS — E78.2 MIXED HYPERLIPIDEMIA: ICD-10-CM

## 2025-08-22 DIAGNOSIS — E66.812 CLASS 2 SEVERE OBESITY DUE TO EXCESS CALORIES WITH SERIOUS COMORBIDITY AND BODY MASS INDEX (BMI) OF 36.0 TO 36.9 IN ADULT: ICD-10-CM

## 2025-08-22 PROCEDURE — 99999 PR PBB SHADOW E&M-EST. PATIENT-LVL III: CPT | Mod: PBBFAC,HCNC,, | Performed by: STUDENT IN AN ORGANIZED HEALTH CARE EDUCATION/TRAINING PROGRAM

## 2025-08-25 PROBLEM — R79.89 ABNORMAL LFTS: Status: RESOLVED | Noted: 2025-07-09 | Resolved: 2025-08-25

## 2025-09-02 ENCOUNTER — HOSPITAL ENCOUNTER (OUTPATIENT)
Dept: RADIATION THERAPY | Facility: HOSPITAL | Age: 80
Discharge: HOME | End: 2025-09-02
Attending: RADIOLOGY
Payer: MEDICARE

## 2025-09-02 ENCOUNTER — PATIENT MESSAGE (OUTPATIENT)
Dept: HEMATOLOGY/ONCOLOGY | Facility: CLINIC | Age: 80
End: 2025-09-02
Payer: MEDICARE

## 2025-09-02 DIAGNOSIS — D17.1 LIPOMA OF TORSO: Primary | ICD-10-CM

## 2025-09-03 ENCOUNTER — TELEPHONE (OUTPATIENT)
Dept: RADIATION ONCOLOGY | Facility: CLINIC | Age: 80
End: 2025-09-03
Payer: MEDICARE